# Patient Record
Sex: FEMALE | Race: WHITE | NOT HISPANIC OR LATINO | Employment: FULL TIME | URBAN - METROPOLITAN AREA
[De-identification: names, ages, dates, MRNs, and addresses within clinical notes are randomized per-mention and may not be internally consistent; named-entity substitution may affect disease eponyms.]

---

## 2018-10-16 ENCOUNTER — APPOINTMENT (EMERGENCY)
Dept: CT IMAGING | Facility: HOSPITAL | Age: 23
End: 2018-10-16
Payer: COMMERCIAL

## 2018-10-16 ENCOUNTER — HOSPITAL ENCOUNTER (EMERGENCY)
Facility: HOSPITAL | Age: 23
Discharge: HOME | End: 2018-10-16
Attending: EMERGENCY MEDICINE
Payer: COMMERCIAL

## 2018-10-16 VITALS
OXYGEN SATURATION: 97 % | SYSTOLIC BLOOD PRESSURE: 99 MMHG | RESPIRATION RATE: 16 BRPM | WEIGHT: 141.54 LBS | DIASTOLIC BLOOD PRESSURE: 65 MMHG | HEIGHT: 63 IN | HEART RATE: 62 BPM | TEMPERATURE: 98.2 F | BODY MASS INDEX: 25.08 KG/M2

## 2018-10-16 DIAGNOSIS — R19.7 DIARRHEA: ICD-10-CM

## 2018-10-16 DIAGNOSIS — R10.9 ABDOMINAL PAIN: Primary | ICD-10-CM

## 2018-10-16 LAB
ALBUMIN SERPL BCP-MCNC: 3.4 G/DL (ref 3.5–5)
ALP SERPL-CCNC: 63 U/L (ref 46–116)
ALT SERPL W P-5'-P-CCNC: 26 U/L (ref 12–78)
ANION GAP SERPL CALCULATED.3IONS-SCNC: 9 MMOL/L (ref 4–13)
AST SERPL W P-5'-P-CCNC: 10 U/L (ref 5–45)
BACTERIA UR QL AUTO: ABNORMAL /HPF
BASOPHILS # BLD AUTO: 0.03 THOUSANDS/ΜL (ref 0–0.1)
BASOPHILS NFR BLD AUTO: 0 % (ref 0–1)
BILIRUB SERPL-MCNC: 0.3 MG/DL (ref 0.2–1)
BILIRUB UR QL STRIP: NEGATIVE
BUN SERPL-MCNC: 9 MG/DL (ref 5–25)
CALCIUM SERPL-MCNC: 8.5 MG/DL (ref 8.3–10.1)
CHLORIDE SERPL-SCNC: 105 MMOL/L (ref 100–108)
CLARITY UR: ABNORMAL
CO2 SERPL-SCNC: 27 MMOL/L (ref 21–32)
COLOR UR: YELLOW
CREAT SERPL-MCNC: 0.72 MG/DL (ref 0.6–1.3)
EOSINOPHIL # BLD AUTO: 0.18 THOUSAND/ΜL (ref 0–0.61)
EOSINOPHIL NFR BLD AUTO: 2 % (ref 0–6)
ERYTHROCYTE [DISTWIDTH] IN BLOOD BY AUTOMATED COUNT: 11.6 % (ref 11.6–15.1)
EXT PREG TEST URINE: NEGATIVE
GFR SERPL CREATININE-BSD FRML MDRD: 118 ML/MIN/1.73SQ M
GLUCOSE SERPL-MCNC: 87 MG/DL (ref 65–140)
GLUCOSE UR STRIP-MCNC: NEGATIVE MG/DL
HCT VFR BLD AUTO: 42.1 % (ref 34.8–46.1)
HGB BLD-MCNC: 14.3 G/DL (ref 11.5–15.4)
HGB UR QL STRIP.AUTO: NEGATIVE
IMM GRANULOCYTES # BLD AUTO: 0.03 THOUSAND/UL (ref 0–0.2)
IMM GRANULOCYTES NFR BLD AUTO: 0 % (ref 0–2)
KETONES UR STRIP-MCNC: ABNORMAL MG/DL
LEUKOCYTE ESTERASE UR QL STRIP: NEGATIVE
LIPASE SERPL-CCNC: 107 U/L (ref 73–393)
LYMPHOCYTES # BLD AUTO: 1 THOUSANDS/ΜL (ref 0.6–4.47)
LYMPHOCYTES NFR BLD AUTO: 11 % (ref 14–44)
MCH RBC QN AUTO: 31.6 PG (ref 26.8–34.3)
MCHC RBC AUTO-ENTMCNC: 34 G/DL (ref 31.4–37.4)
MCV RBC AUTO: 93 FL (ref 82–98)
MONOCYTES # BLD AUTO: 0.64 THOUSAND/ΜL (ref 0.17–1.22)
MONOCYTES NFR BLD AUTO: 7 % (ref 4–12)
MUCOUS THREADS UR QL AUTO: ABNORMAL
NEUTROPHILS # BLD AUTO: 7.68 THOUSANDS/ΜL (ref 1.85–7.62)
NEUTS SEG NFR BLD AUTO: 80 % (ref 43–75)
NITRITE UR QL STRIP: NEGATIVE
NON-SQ EPI CELLS URNS QL MICRO: ABNORMAL /HPF
NRBC BLD AUTO-RTO: 0 /100 WBCS
PH UR STRIP.AUTO: 5.5 [PH] (ref 4.5–8)
PLATELET # BLD AUTO: 204 THOUSANDS/UL (ref 149–390)
PMV BLD AUTO: 10.8 FL (ref 8.9–12.7)
POTASSIUM SERPL-SCNC: 3.5 MMOL/L (ref 3.5–5.3)
PROT SERPL-MCNC: 7 G/DL (ref 6.4–8.2)
PROT UR STRIP-MCNC: ABNORMAL MG/DL
RBC # BLD AUTO: 4.53 MILLION/UL (ref 3.81–5.12)
RBC #/AREA URNS AUTO: ABNORMAL /HPF
SODIUM SERPL-SCNC: 141 MMOL/L (ref 136–145)
SP GR UR STRIP.AUTO: >=1.03 (ref 1–1.03)
TSH SERPL DL<=0.05 MIU/L-ACNC: 2.64 UIU/ML (ref 0.36–3.74)
UROBILINOGEN UR QL STRIP.AUTO: 0.2 E.U./DL
WBC # BLD AUTO: 9.56 THOUSAND/UL (ref 4.31–10.16)
WBC #/AREA URNS AUTO: ABNORMAL /HPF

## 2018-10-16 PROCEDURE — 36415 COLL VENOUS BLD VENIPUNCTURE: CPT | Performed by: EMERGENCY MEDICINE

## 2018-10-16 PROCEDURE — 84443 ASSAY THYROID STIM HORMONE: CPT | Performed by: EMERGENCY MEDICINE

## 2018-10-16 PROCEDURE — 99284 EMERGENCY DEPT VISIT MOD MDM: CPT

## 2018-10-16 PROCEDURE — 96361 HYDRATE IV INFUSION ADD-ON: CPT

## 2018-10-16 PROCEDURE — 85025 COMPLETE CBC W/AUTO DIFF WBC: CPT | Performed by: EMERGENCY MEDICINE

## 2018-10-16 PROCEDURE — 96375 TX/PRO/DX INJ NEW DRUG ADDON: CPT

## 2018-10-16 PROCEDURE — 74177 CT ABD & PELVIS W/CONTRAST: CPT

## 2018-10-16 PROCEDURE — 83690 ASSAY OF LIPASE: CPT | Performed by: EMERGENCY MEDICINE

## 2018-10-16 PROCEDURE — 81001 URINALYSIS AUTO W/SCOPE: CPT | Performed by: EMERGENCY MEDICINE

## 2018-10-16 PROCEDURE — 81025 URINE PREGNANCY TEST: CPT | Performed by: EMERGENCY MEDICINE

## 2018-10-16 PROCEDURE — 96374 THER/PROPH/DIAG INJ IV PUSH: CPT

## 2018-10-16 PROCEDURE — 80053 COMPREHEN METABOLIC PANEL: CPT | Performed by: EMERGENCY MEDICINE

## 2018-10-16 RX ORDER — FAMOTIDINE 20 MG/1
20 TABLET, FILM COATED ORAL 2 TIMES DAILY
Qty: 30 TABLET | Refills: 0 | Status: SHIPPED | OUTPATIENT
Start: 2018-10-16 | End: 2018-10-24

## 2018-10-16 RX ORDER — ONDANSETRON 2 MG/ML
4 INJECTION INTRAMUSCULAR; INTRAVENOUS ONCE
Status: COMPLETED | OUTPATIENT
Start: 2018-10-16 | End: 2018-10-16

## 2018-10-16 RX ORDER — DICYCLOMINE HCL 20 MG
20 TABLET ORAL ONCE
Status: COMPLETED | OUTPATIENT
Start: 2018-10-16 | End: 2018-10-16

## 2018-10-16 RX ORDER — KETOROLAC TROMETHAMINE 30 MG/ML
15 INJECTION, SOLUTION INTRAMUSCULAR; INTRAVENOUS ONCE
Status: COMPLETED | OUTPATIENT
Start: 2018-10-16 | End: 2018-10-16

## 2018-10-16 RX ORDER — NAPROXEN 375 MG/1
375 TABLET ORAL 2 TIMES DAILY WITH MEALS
Qty: 20 TABLET | Refills: 0 | Status: SHIPPED | OUTPATIENT
Start: 2018-10-16 | End: 2018-10-24

## 2018-10-16 RX ADMIN — IOHEXOL 100 ML: 350 INJECTION, SOLUTION INTRAVENOUS at 10:33

## 2018-10-16 RX ADMIN — ONDANSETRON 4 MG: 2 INJECTION INTRAMUSCULAR; INTRAVENOUS at 08:15

## 2018-10-16 RX ADMIN — KETOROLAC TROMETHAMINE 15 MG: 30 INJECTION, SOLUTION INTRAMUSCULAR at 12:21

## 2018-10-16 RX ADMIN — SODIUM CHLORIDE 1000 ML: 0.9 INJECTION, SOLUTION INTRAVENOUS at 08:14

## 2018-10-16 RX ADMIN — DICYCLOMINE HYDROCHLORIDE 20 MG: 20 TABLET ORAL at 08:16

## 2018-10-16 RX ADMIN — IOHEXOL 50 ML: 240 INJECTION, SOLUTION INTRATHECAL; INTRAVASCULAR; INTRAVENOUS; ORAL at 10:36

## 2018-10-16 NOTE — ED NOTES
Discharge instructions reviewed, patient has no new complaints or concerns at time of discharge  Patient ambulated out of department, steady gait, vss         Norma Nichols RN  10/16/18 7379

## 2018-10-16 NOTE — DISCHARGE INSTRUCTIONS

## 2018-10-16 NOTE — ED PROVIDER NOTES
Pt Name: Lisa Bañuelos  MRN: [id number]  [place] 1995  Age/Sex: 24 y o  female  Date of evaluation: 10/16/2018  PCP: No primary care provider on file  CHIEF COMPLAINT    Chief Complaint   Patient presents with    Abdominal Pain     Patient c/o abdominal and back pain over the last 3 weeks  Patient states nothing makes it worse or better   Diarrhea     Patient also c/o of diarhhea for the last 3 weeks  HPI    24 y o  female presenting with 3 weeks of abdominal pain and diarrhea  Patient notes 5-6 loose stools per day over the past 3 weeks, nonbloody, intensified by eating large amounts and better with eating less  Abdominal pain varies between mild and severe, sharp and cramping, in the right and left lower quadrants, worse in the right lower quadrant, radiating to the back, typically mild but sometimes very severe(described as worse than labor), relieved by bowel movement  She denies trauma, fevers, recent antibiotic use, recent travel, prior episodes, vomiting, blood in stool, other symptoms  Patient denies any changes in medications recently, denies history of ulcerative colitis or Crohn's disease  HPI      Past Medical and Surgical History    History reviewed  No pertinent past medical history  History reviewed  No pertinent surgical history  History reviewed  No pertinent family history  Social History   Substance Use Topics    Smoking status: Current Every Day Smoker    Smokeless tobacco: Never Used    Alcohol use No           Allergies    Allergies   Allergen Reactions    Penicillins Anaphylaxis    Codeine GI Intolerance    Doxycycline GI Intolerance    Zithromax [Azithromycin] GI Intolerance       Home Medications    Prior to Admission medications    Not on File           Review of Systems    Review of Systems   Constitutional: Negative for activity change, chills and fever  HENT: Negative for drooling and facial swelling      Eyes: Negative for pain, discharge and visual disturbance  Respiratory: Negative for apnea, cough, chest tightness, shortness of breath and wheezing  Cardiovascular: Negative for chest pain and leg swelling  Gastrointestinal: Positive for abdominal distention, abdominal pain and diarrhea  Negative for constipation, nausea and vomiting  Genitourinary: Negative for difficulty urinating, dysuria and urgency  Musculoskeletal: Positive for back pain  Negative for arthralgias and gait problem  Skin: Negative for color change and rash  Neurological: Negative for dizziness, speech difficulty, weakness and headaches  Psychiatric/Behavioral: Negative for agitation, behavioral problems and confusion  All other systems reviewed and negative  Physical Exam      ED Triage Vitals [10/16/18 0754]   Temperature Pulse Respirations Blood Pressure SpO2   98 2 °F (36 8 °C) 85 16 119/78 97 %      Temp Source Heart Rate Source Patient Position - Orthostatic VS BP Location FiO2 (%)   Oral Monitor Sitting Right arm --      Pain Score       --               Physical Exam   Constitutional: She is oriented to person, place, and time  She appears well-developed and well-nourished  HENT:   Head: Normocephalic and atraumatic  Eyes: Pupils are equal, round, and reactive to light  Conjunctivae and EOM are normal    Neck: Normal range of motion  Neck supple  Cardiovascular: Normal rate, regular rhythm, normal heart sounds and intact distal pulses  Pulmonary/Chest: Effort normal and breath sounds normal  No respiratory distress  She has no wheezes  She has no rales  Abdominal: Soft  She exhibits no distension and no mass  There is tenderness  There is no rebound and no guarding  Tender to palpation in right lower and left lower quadrant, worse in right lower quadrant, no rebound, no guarding, no masses  Mild CVA tenderness on both sides  Musculoskeletal: Normal range of motion  She exhibits no edema or deformity     Neurological: She is alert and oriented to person, place, and time  Skin: Skin is warm and dry  No rash noted  No erythema  Psychiatric: She has a normal mood and affect   Her behavior is normal  Judgment and thought content normal             Diagnostic Results      Labs:    Results for orders placed or performed during the hospital encounter of 10/16/18   UA w Reflex to Microscopic w Reflex to Culture   Result Value Ref Range    Color, UA Yellow     Clarity, UA Slightly Cloudy     Specific Gravity, UA >=1 030 1 003 - 1 030    pH, UA 5 5 4 5 - 8 0    Leukocytes, UA Negative Negative    Nitrite, UA Negative Negative    Protein, UA Trace (A) Negative mg/dl    Glucose, UA Negative Negative mg/dl    Ketones, UA Trace (A) Negative mg/dl    Urobilinogen, UA 0 2 0 2, 1 0 E U /dl E U /dl    Bilirubin, UA Negative Negative    Blood, UA Negative Negative   CBC and differential   Result Value Ref Range    WBC 9 56 4 31 - 10 16 Thousand/uL    RBC 4 53 3 81 - 5 12 Million/uL    Hemoglobin 14 3 11 5 - 15 4 g/dL    Hematocrit 42 1 34 8 - 46 1 %    MCV 93 82 - 98 fL    MCH 31 6 26 8 - 34 3 pg    MCHC 34 0 31 4 - 37 4 g/dL    RDW 11 6 11 6 - 15 1 %    MPV 10 8 8 9 - 12 7 fL    Platelets 756 983 - 642 Thousands/uL    nRBC 0 /100 WBCs    Neutrophils Relative 80 (H) 43 - 75 %    Immat GRANS % 0 0 - 2 %    Lymphocytes Relative 11 (L) 14 - 44 %    Monocytes Relative 7 4 - 12 %    Eosinophils Relative 2 0 - 6 %    Basophils Relative 0 0 - 1 %    Neutrophils Absolute 7 68 (H) 1 85 - 7 62 Thousands/µL    Immature Grans Absolute 0 03 0 00 - 0 20 Thousand/uL    Lymphocytes Absolute 1 00 0 60 - 4 47 Thousands/µL    Monocytes Absolute 0 64 0 17 - 1 22 Thousand/µL    Eosinophils Absolute 0 18 0 00 - 0 61 Thousand/µL    Basophils Absolute 0 03 0 00 - 0 10 Thousands/µL   Comprehensive metabolic panel   Result Value Ref Range    Sodium 141 136 - 145 mmol/L    Potassium 3 5 3 5 - 5 3 mmol/L    Chloride 105 100 - 108 mmol/L    CO2 27 21 - 32 mmol/L    ANION GAP 9 4 - 13 mmol/L    BUN 9 5 - 25 mg/dL    Creatinine 0 72 0 60 - 1 30 mg/dL    Glucose 87 65 - 140 mg/dL    Calcium 8 5 8 3 - 10 1 mg/dL    AST 10 5 - 45 U/L    ALT 26 12 - 78 U/L    Alkaline Phosphatase 63 46 - 116 U/L    Total Protein 7 0 6 4 - 8 2 g/dL    Albumin 3 4 (L) 3 5 - 5 0 g/dL    Total Bilirubin 0 30 0 20 - 1 00 mg/dL    eGFR 118 ml/min/1 73sq m   Lipase   Result Value Ref Range    Lipase 107 73 - 393 u/L   Urine Microscopic   Result Value Ref Range    RBC, UA 1-2 (A) None Seen, 0-5 /hpf    WBC, UA 1-2 (A) None Seen, 0-5, 5-55, 5-65 /hpf    Epithelial Cells Occasional None Seen, Occasional /hpf    Bacteria, UA Occasional None Seen, Occasional /hpf    MUCOUS THREADS Innumerable (A) None Seen   TSH   Result Value Ref Range    TSH 3RD GENERATON 2 642 0 358 - 3 740 uIU/mL   POCT pregnancy, urine   Result Value Ref Range    EXT PREG TEST UR (Ref: Negative) negative        All labs reviewed and utilized in the medical decision making process    Radiology:    CT abdomen pelvis with contrast   Final Result      No acute inflammatory process detected in the abdomen or pelvis  No evidence of bowel obstruction              Workstation performed: [id number]             All radiology studies independently viewed by me and interpreted by the radiologist     Procedure    Procedures    CritCare Time      ED Course of Care and Re-Assessments      Symptoms improved substantially with treatment as below    Medications   ketorolac (TORADOL) injection 15 mg (not administered)   ondansetron (ZOFRAN) injection 4 mg (4 mg Intravenous Given 10/16/18 0815)   sodium chloride 0 9 % bolus 1,000 mL (0 mL Intravenous Stopped 10/16/18 0914)   dicyclomine (BENTYL) tablet 20 mg (20 mg Oral Given 10/16/18 0816)   iohexol (OMNIPAQUE) 240 MG/ML solution 50 mL (50 mL Oral Given 10/16/18 1036)   iohexol (OMNIPAQUE) 350 MG/ML injection (MULTI-DOSE) 100 mL (100 mL Intravenous Given 10/16/18 1033)           FINAL IMPRESSION    Final diagnoses:   Abdominal pain   Diarrhea         DISPOSITION/PLAN    34-year-old female with history and symptoms above  Vital signs reassuring, examination remarkable for tenderness as above with overall benign abdominal exam   Labs and CT scan both returned reassuring  Do not suspect sepsis, appendicitis, cholecystitis, diverticulitis, severe Crohn's or ulcerative colitis, bowel obstruction other acute life threat  Patient referred to Gastroenterology for further care with recommendation for colonoscopy, given Naprosyn and famotidine  Hemodynamically stable and comfortable at time of discharge  Time reflects when diagnosis was documented in both MDM as applicable and the Disposition within this note     Time User Action Codes Description Comment    10/16/2018 12:14 PM Anna Heal T Add [R10 9] Abdominal pain     10/16/2018 12:14 PM Phoenix Heal T Add [R19 7] Diarrhea       ED Disposition     ED Disposition Condition Comment    Discharge  [address] discharge to home/self care      Condition at discharge: Good        Follow-up Information     Follow up With Specialties Details Why Contact Info Additional [facility] Call today To discuss your symptoms and schedule follow-up as soon as possible [address] [facility], [address] [place], [place], [phone]            PATIENT REFERRED TO:    [facility]  David Cosby [address]  Call today  To discuss your symptoms and schedule follow-up as soon as possible      DISCHARGE MEDICATIONS:    Patient's Medications   Discharge Prescriptions    FAMOTIDINE (PEPCID) 20 MG TABLET    Take 1 tablet (20 mg total) by mouth 2 (two) times a day       Start Date: 10/16/2018End Date: --       Order Dose: 20 mg       Quantity: 30 tablet    Refills: 0    NAPROXEN (NAPROSYN) 375 MG TABLET    Take 1 tablet (375 mg total) by mouth 2 (two) times a day with meals       Start Date: 10/16/2018End Date: --       Order Dose: 375 mg       Quantity: 20 tablet    Refills: 0       No discharge procedures on file           MD Natasha Márquez MD  10/16/18 1417

## 2018-10-24 ENCOUNTER — OFFICE VISIT (OUTPATIENT)
Dept: GASTROENTEROLOGY | Facility: CLINIC | Age: 23
End: 2018-10-24
Payer: COMMERCIAL

## 2018-10-24 VITALS
DIASTOLIC BLOOD PRESSURE: 72 MMHG | HEART RATE: 76 BPM | HEIGHT: 63 IN | BODY MASS INDEX: 24.27 KG/M2 | SYSTOLIC BLOOD PRESSURE: 100 MMHG | WEIGHT: 137 LBS

## 2018-10-24 DIAGNOSIS — R19.7 DIARRHEA, UNSPECIFIED TYPE: Primary | ICD-10-CM

## 2018-10-24 DIAGNOSIS — K92.1 BLACK STOOL: ICD-10-CM

## 2018-10-24 DIAGNOSIS — R10.13 EPIGASTRIC PAIN: ICD-10-CM

## 2018-10-24 DIAGNOSIS — R10.30 LOWER ABDOMINAL PAIN: ICD-10-CM

## 2018-10-24 PROCEDURE — 99204 OFFICE O/P NEW MOD 45 MIN: CPT | Performed by: NURSE PRACTITIONER

## 2018-10-24 RX ORDER — DICYCLOMINE HCL 20 MG
20 TABLET ORAL EVERY 6 HOURS
Qty: 120 TABLET | Refills: 0 | Status: SHIPPED | OUTPATIENT
Start: 2018-10-24 | End: 2018-11-20 | Stop reason: SDUPTHER

## 2018-10-24 RX ORDER — LEVONORGESTREL / ETHINYL ESTRADIOL AND ETHINYL ESTRADIOL 150-30(84)
1 KIT ORAL
COMMUNITY
Start: 2018-07-02 | End: 2019-07-02

## 2018-10-24 RX ORDER — PANTOPRAZOLE SODIUM 40 MG/1
40 TABLET, DELAYED RELEASE ORAL DAILY
Qty: 30 TABLET | Refills: 3 | Status: SHIPPED | OUTPATIENT
Start: 2018-10-24 | End: 2022-01-11 | Stop reason: SDUPTHER

## 2018-10-24 RX ORDER — CLONAZEPAM 1 MG/1
1 TABLET ORAL DAILY
COMMUNITY
Start: 2018-10-23

## 2018-10-24 RX ORDER — ALBUTEROL SULFATE 90 UG/1
1 AEROSOL, METERED RESPIRATORY (INHALATION) EVERY 6 HOURS
COMMUNITY
Start: 2018-10-23 | End: 2019-10-23

## 2018-10-24 NOTE — PROGRESS NOTES
Assessment/Plan:    Diarrhea up to 6 times a day with crampy abdominal pain and bloody and black stool:  Infectious stool workup   EGD and colonoscopy    Epigastric pain, current every day smoker   pantoprazole as prescribed     Diagnoses and all orders for this visit:    Diarrhea, unspecified type  -     Ova and parasite examination; Future  -     White Blood Cells, Stool by Gram Stain; Future  -     Clostridium difficile toxin by PCR; Future  -     Stool Enteric Bacterial Panel by PCR; Future  -     dicyclomine (BENTYL) 20 mg tablet; Take 1 tablet (20 mg total) by mouth every 6 (six) hours for 30 days    Lower abdominal pain  -     dicyclomine (BENTYL) 20 mg tablet; Take 1 tablet (20 mg total) by mouth every 6 (six) hours for 30 days    Black stool    Epigastric pain  -     pantoprazole (PROTONIX) 40 mg tablet; Take 1 tablet (40 mg total) by mouth daily    Other orders  -     albuterol (PROVENTIL HFA,VENTOLIN HFA) 90 mcg/act inhaler; Inhale 1 puff every 6 (six) hours  -     clonazePAM (KlonoPIN) 1 mg tablet; Take 1 mg by mouth daily  -     Levonorgest-Eth Estrad 91-Day 0 15-0 03 &0 01 MG TABS; Take 1 tablet by mouth    @ASSESSMENTEN  D@     Subjective:      Patient ID: Eva Sanchez is a 24 y o  female  43-year-old female with a four week history of loose stools and sometimes liquid stools up to 6 times a day  She has black stools and also has stools with blood in them  She took a picture and showed it to me today  She reports epigastric pain but no nausea or vomiting and heartburn but no dysphagia or sore throat  She reports no recent travel or hospitalization  She takes NSAIDS on occasion she does take Imitrex for migraines and she is a current every day smoker about 1-2 cigarettes  She does not take any herbal supplements  She has not had any stool workup done    Recently seen in the emergency room within normal CT of the abdomen and pelvis and normal CBC today  She has fatigue but no fever or chills and she has no joint pain            The following portions of the patient's history were reviewed and updated as appropriate: She  has no past medical history on file  She   Patient Active Problem List    Diagnosis Date Noted    Lower abdominal pain 10/24/2018    Diarrhea 10/24/2018    Epigastric pain 10/24/2018    Black stools 10/24/2018     She  has a past surgical history that includes Tonsilectomy, adenoidectomy, bilateral myringotomy and tubes; Soft tissue mass excision; CHROMOSOME ANALYSIS, PRODUCTS OF CONCEPTION (HISTORICAL); and [place] tooth extraction  Her family history includes Diabetes in her mother; Heart disease in her father; Hypertension in her father; Stroke in her father  She  reports that she has been smoking  She has never used smokeless tobacco  She reports that she does not drink alcohol or use drugs  Current Outpatient Prescriptions   Medication Sig Dispense Refill    albuterol (PROVENTIL HFA,VENTOLIN HFA) 90 mcg/act inhaler Inhale 1 puff every 6 (six) hours      clonazePAM (KlonoPIN) 1 mg tablet Take 1 mg by mouth daily      Levonorgest-Eth Estrad 91-Day 0 15-0 03 &0 01 MG TABS Take 1 tablet by mouth      dicyclomine (BENTYL) 20 mg tablet Take 1 tablet (20 mg total) by mouth every 6 (six) hours for 30 days 120 tablet 0    pantoprazole (PROTONIX) 40 mg tablet Take 1 tablet (40 mg total) by mouth daily 30 tablet 3     No current facility-administered medications for this visit  Current Outpatient Prescriptions on File Prior to Visit   Medication Sig    [DISCONTINUED] famotidine (PEPCID) 20 mg tablet Take 1 tablet (20 mg total) by mouth 2 (two) times a day    [DISCONTINUED] naproxen (NAPROSYN) 375 mg tablet Take 1 tablet (375 mg total) by mouth 2 (two) times a day with meals     No current facility-administered medications on file prior to visit  She is allergic to penicillins; codeine; doxycycline; and zithromax [azithromycin]       Review of Systems Constitutional: Positive for fatigue  HENT: Negative  Respiratory: Negative  Cardiovascular: Negative  Gastrointestinal: Positive for abdominal distention, abdominal pain, anal bleeding, blood in stool and diarrhea  Skin: Negative  Psychiatric/Behavioral: Negative  Objective:      /72   Pulse 76   Ht 5' 3" (1 6 m)   Wt 62 1 kg (137 lb)   LMP 10/06/2018 (Approximate)   BMI 24 27 kg/m²          Physical Exam   Constitutional: She is oriented to person, place, and time  She appears well-developed and well-nourished  Eyes: No scleral icterus  Cardiovascular: Normal rate and regular rhythm  Pulmonary/Chest: Effort normal and breath sounds normal    Abdominal: Soft  Bowel sounds are normal  There is tenderness  Lymphadenopathy:     She has no cervical adenopathy  Neurological: She is alert and oriented to person, place, and time  Skin: Skin is warm and dry  Psychiatric: She has a normal mood and affect

## 2018-10-29 ENCOUNTER — TELEPHONE (OUTPATIENT)
Dept: GASTROENTEROLOGY | Facility: CLINIC | Age: 23
End: 2018-10-29

## 2018-10-29 NOTE — TELEPHONE ENCOUNTER
Start clear liquids today, have her take 10 oz of Mag citrate today and tomorrow, and have her do Fleet enemas today and tomorrow before she takes a laxative, and then a regular prep

## 2018-10-29 NOTE — TELEPHONE ENCOUNTER
ptn is scheduled for a colon on 10/31/18 but she hasn't made a BM in about 8 days  She states she tried laxatives, suppositories, and enemas and still cant go so she doesn't think the prep will work   Please advise

## 2018-10-29 NOTE — TELEPHONE ENCOUNTER
Pt already tried enemas  But she will start the clear liquids and the mag citrate   If nothing happens she will call this afternoon

## 2018-10-29 NOTE — TELEPHONE ENCOUNTER
Just what I already suggested - and do the enemas even if she tried them before  If nothing works she may be impacted, and should probably go to the er

## 2018-10-30 ENCOUNTER — ANESTHESIA EVENT (OUTPATIENT)
Dept: PERIOP | Facility: HOSPITAL | Age: 23
End: 2018-10-30
Payer: COMMERCIAL

## 2018-10-30 NOTE — ANESTHESIA PREPROCEDURE EVALUATION
Review of Systems/Medical History  Patient summary reviewed        Cardiovascular  Negative cardio ROS    Pulmonary  Smoker cigarette smoker  , Tobacco cessation counseling given ,        GI/Hepatic    GERD ,        Negative  ROS        Endo/Other  Negative endo/other ROS      GYN  Negative gynecology ROS          Hematology  Negative hematology ROS      Musculoskeletal  Negative musculoskeletal ROS        Neurology  Negative neurology ROS      Psychology   Anxiety,              Physical Exam    Airway    Mallampati score: I  TM Distance: >3 FB  Neck ROM: full     Dental       Cardiovascular  Comment: Negative ROS, Cardiovascular exam normal    Pulmonary  Pulmonary exam normal     Other Findings        Anesthesia Plan  ASA Score- 2     Anesthesia Type- IV sedation with anesthesia with ASA Monitors  Additional Monitors:   Airway Plan:         Plan Factors-    Induction- intravenous  Postoperative Plan-     Informed Consent- Anesthetic plan and risks discussed with patient  I personally reviewed this patient with the CRNA  Discussed and agreed on the Anesthesia Plan with the CRNA  Sakshi Mckeon

## 2018-10-31 ENCOUNTER — ANESTHESIA (OUTPATIENT)
Dept: PERIOP | Facility: HOSPITAL | Age: 23
End: 2018-10-31
Payer: COMMERCIAL

## 2018-10-31 ENCOUNTER — HOSPITAL ENCOUNTER (OUTPATIENT)
Facility: HOSPITAL | Age: 23
Setting detail: OUTPATIENT SURGERY
Discharge: HOME | End: 2018-10-31
Attending: INTERNAL MEDICINE | Admitting: INTERNAL MEDICINE
Payer: COMMERCIAL

## 2018-10-31 VITALS
BODY MASS INDEX: 23.83 KG/M2 | DIASTOLIC BLOOD PRESSURE: 69 MMHG | TEMPERATURE: 97.5 F | OXYGEN SATURATION: 98 % | HEIGHT: 63 IN | WEIGHT: 134.48 LBS | SYSTOLIC BLOOD PRESSURE: 112 MMHG | HEART RATE: 77 BPM | RESPIRATION RATE: 24 BRPM

## 2018-10-31 DIAGNOSIS — R19.7 DIARRHEA, UNSPECIFIED TYPE: ICD-10-CM

## 2018-10-31 DIAGNOSIS — R10.13 EPIGASTRIC PAIN: ICD-10-CM

## 2018-10-31 DIAGNOSIS — K92.1 BLACK STOOLS: ICD-10-CM

## 2018-10-31 PROCEDURE — 45380 COLONOSCOPY AND BIOPSY: CPT | Performed by: INTERNAL MEDICINE

## 2018-10-31 PROCEDURE — 88305 TISSUE EXAM BY PATHOLOGIST: CPT | Performed by: PATHOLOGY

## 2018-10-31 PROCEDURE — 88341 IMHCHEM/IMCYTCHM EA ADD ANTB: CPT | Performed by: PATHOLOGY

## 2018-10-31 PROCEDURE — 88342 IMHCHEM/IMCYTCHM 1ST ANTB: CPT | Performed by: PATHOLOGY

## 2018-10-31 PROCEDURE — 43239 EGD BIOPSY SINGLE/MULTIPLE: CPT | Performed by: INTERNAL MEDICINE

## 2018-10-31 RX ORDER — PROPOFOL 10 MG/ML
INJECTION, EMULSION INTRAVENOUS AS NEEDED
Status: DISCONTINUED | OUTPATIENT
Start: 2018-10-31 | End: 2018-10-31 | Stop reason: SURG

## 2018-10-31 RX ORDER — SODIUM CHLORIDE, SODIUM LACTATE, POTASSIUM CHLORIDE, CALCIUM CHLORIDE 600; 310; 30; 20 MG/100ML; MG/100ML; MG/100ML; MG/100ML
125 INJECTION, SOLUTION INTRAVENOUS CONTINUOUS
Status: DISCONTINUED | OUTPATIENT
Start: 2018-10-31 | End: 2018-10-31 | Stop reason: HOSPADM

## 2018-10-31 RX ADMIN — PROPOFOL 100 MG: 10 INJECTION, EMULSION INTRAVENOUS at 11:45

## 2018-10-31 RX ADMIN — SODIUM CHLORIDE, SODIUM LACTATE, POTASSIUM CHLORIDE, AND CALCIUM CHLORIDE 125 ML/HR: .6; .31; .03; .02 INJECTION, SOLUTION INTRAVENOUS at 11:31

## 2018-10-31 RX ADMIN — PROPOFOL 100 MG: 10 INJECTION, EMULSION INTRAVENOUS at 11:51

## 2018-10-31 RX ADMIN — PROPOFOL 100 MG: 10 INJECTION, EMULSION INTRAVENOUS at 12:01

## 2018-10-31 NOTE — DISCHARGE INSTR - AVS FIRST PAGE
Postoperative Note  PATIENT NAME: Raymond Gotti  : 1995  MRN: [id number]  [facility] 01    Surgery Date: 10/31/2018    POST-OP DIAGNOSIS: See the impression below    SEDATION: Monitored anesthesia care, check anesthesia records    PHYSICAL EXAM:  Vitals:    10/31/18 1117   BP: 109/74   Pulse: 93   Resp: 16   Temp: 97 7 °F (36 5 °C)   SpO2: 96%     Body mass index is 23 82 kg/m²  General: NAD  Heart: S1 & S2 normal, RRR  Lungs: CTA, No rales or rhonchi  Abdomen: Soft, nontender, nondistended, good bowel sounds    CONSENT:  Informed consent was obtained for the procedure, including sedation after explaining the risks and benefits of the procedure  Risks including but not limited to bleeding, perforation, infection, aspiration were discussed in detail  Also explained about less than 100%$ sensitivity with the exam and other alternatives  DESCRIPTION:   Procedure:  Esophagogastroduodenoscopy with biopsy     indications:  48-year-old female with midepigastric pain and possible melena    ASA 2    Estimated blood loss insignificant    Premedicated with mac anesthesia    Patient is identified by me  She is examined prior to the procedure and found to be in stable condition  She is attached to the cardiac monitor and pulse oximeter and placed in left lateral position  After adequate intravenous sedation the Olympus video gastric scope is inserted under direct vision into the esophagus  The esophageal mucosa is completely unremarkable throughout its length with a normal Z-line at 39 cm  The stomach is entered  The body and antrum normal   Pylorus is normal   Duodenum was cannulated into the 3rd portion and is normal   Retroversion reveals a normal GE junction cardia and fundus  Biopsies taken of the antrum body and fundus with excellent hemostasis  She tolerated the procedure well and was stable throughout      My impression:  Normal upper GI endoscopy, status post biopsy    RECOMMENDATIONS:  Follow up biopsy results in 1 week  Continue current medical management    COMPLICATIONS:  None; patient tolerated the procedure well  DISPOSITION: PACU  CONDITION: Stable    Dayna Hurst MD  10/31/2018,11:52 AM        Portions of the record may have been created with voice recognition software   Occasional wrong word or "sound a like" substitutions may have occurred due to the inherent limitations of voice recognition software   Read the chart carefully and recognize, using context, where substitutions have occurred  Postoperative Note  PATIENT NAME: Jessica Jones  : 1995  MRN: [id number]  [facility]    Surgery Date: 10/31/2018    POST-OP DIAGNOSIS: See the impression below    SEDATION: Monitored anesthesia care, check anesthesia records    PHYSICAL EXAM:  Vitals:    10/31/18 1117   BP: 109/74   Pulse: 93   Resp: 16   Temp: 97 7 °F (36 5 °C)   SpO2: 96%     Body mass index is 23 82 kg/m²  General: NAD  Heart: S1 & S2 normal, RRR  Lungs: CTA, No rales or rhonchi  Abdomen: Soft, nontender, nondistended, good bowel sounds    CONSENT:  Informed consent was obtained for the procedure, including sedation after explaining the risks and benefits of the procedure  Risks including but not limited to bleeding, perforation, infection, aspiration were discussed in detail  Also explained about less than 100%$ sensitivity with the exam and other alternatives  DESCRIPTION:   Procedure:  Fiberoptic colonoscopy with biopsy    Indications:  25-year-old female with bilateral lower abdominal pain diarrhea and constipation    ASA 2    Estimated blood loss insignificant    Premedicated with mac anesthesia    Patient is identified by me  She is examined prior to the procedure and found to be in stable condition  She is attached to the cardiac monitor and pulse oximeter and placed left lateral position    After adequate intravenous sedation the Olympus video colonoscope was inserted and passed easily to the cecum  The ileocecal valve was identified as well as the appendiceal orifice  The valve was cannulated and the terminal ileum is examined for approximately 10 cm  The scope was slowly withdrawn good circumferential visualization of the mucosa  Preparation is adequate  The terminal ileum as well as the entire colonic mucosa is completely unremarkable with no inflammatory neoplastic or vascular lesions noted  Retroversion is normal   Biopsies taken of the terminal ileum, ascending, and descending colon with excellent hemostasis  Retroversion is normal   She tolerated the procedure well and was stable throughout  My impression:  Normal colon terminal ileum, status post ileal and colonic biopsies    RECOMMENDATIONS:  Follow up biopsy results in 1 week    COMPLICATIONS:  None; patient tolerated the procedure well  DISPOSITION: PACU  CONDITION: Stable    Yusuf Andrew MD  10/31/2018,12:07 PM        Portions of the record may have been created with voice recognition software   Occasional wrong word or "sound a like" substitutions may have occurred due to the inherent limitations of voice recognition software   Read the chart carefully and recognize, using context, where substitutions have occurred

## 2018-10-31 NOTE — OP NOTE
Postoperative Note  PATIENT NAME: Christofer Zhang  : 1995  MRN: [id number]  [facility]    Surgery Date: 10/31/2018    POST-OP DIAGNOSIS: See the impression below    SEDATION: Monitored anesthesia care, check anesthesia records    PHYSICAL EXAM:  Vitals:    10/31/18 1117   BP: 109/74   Pulse: 93   Resp: 16   Temp: 97 7 °F (36 5 °C)   SpO2: 96%     Body mass index is 23 82 kg/m²  General: NAD  Heart: S1 & S2 normal, RRR  Lungs: CTA, No rales or rhonchi  Abdomen: Soft, nontender, nondistended, good bowel sounds    CONSENT:  Informed consent was obtained for the procedure, including sedation after explaining the risks and benefits of the procedure  Risks including but not limited to bleeding, perforation, infection, aspiration were discussed in detail  Also explained about less than 100%$ sensitivity with the exam and other alternatives  DESCRIPTION:   Procedure:  Fiberoptic colonoscopy with biopsy    Indications:  21-year-old female with bilateral lower abdominal pain diarrhea and constipation    ASA 2    Estimated blood loss insignificant    Premedicated with mac anesthesia    Patient is identified by me  She is examined prior to the procedure and found to be in stable condition  She is attached to the cardiac monitor and pulse oximeter and placed left lateral position  After adequate intravenous sedation the Olympus video colonoscope was inserted and passed easily to the cecum  The ileocecal valve was identified as well as the appendiceal orifice  The valve was cannulated and the terminal ileum is examined for approximately 10 cm  The scope was slowly withdrawn good circumferential visualization of the mucosa  Preparation is adequate  The terminal ileum as well as the entire colonic mucosa is completely unremarkable with no inflammatory neoplastic or vascular lesions noted    Retroversion is normal   Biopsies taken of the terminal ileum, ascending, and descending colon with excellent hemostasis  Retroversion is normal   She tolerated the procedure well and was stable throughout  My impression:  Normal colon terminal ileum, status post ileal and colonic biopsies    RECOMMENDATIONS:  Follow up biopsy results in 1 week    COMPLICATIONS:  None; patient tolerated the procedure well  DISPOSITION: PACU  CONDITION: Stable    Aldo Betancur MD  10/31/2018,12:07 PM        Portions of the record may have been created with voice recognition software   Occasional wrong word or "sound a like" substitutions may have occurred due to the inherent limitations of voice recognition software   Read the chart carefully and recognize, using context, where substitutions have occurred

## 2018-10-31 NOTE — ANESTHESIA POSTPROCEDURE EVALUATION
Post-Op Assessment Note      CV Status:  Stable    Mental Status:  Alert and awake    Hydration Status:  Euvolemic    PONV Controlled:  Controlled    Airway Patency:  Patent    Post Op Vitals Reviewed: Yes          Staff: Anesthesiologist, CRNA           /58 (10/31/18 1210)    Temp 98 °F (36 7 °C) (10/31/18 1210)    Pulse 84 (10/31/18 1210)   Resp 16 (10/31/18 1210)    SpO2 96 % (10/31/18 1210)

## 2018-10-31 NOTE — OP NOTE
Postoperative Note  PATIENT NAME: Luz Lopez  : 1995  MRN: [id number]  [facility]    Surgery Date: 10/31/2018    POST-OP DIAGNOSIS: See the impression below    SEDATION: Monitored anesthesia care, check anesthesia records    PHYSICAL EXAM:  Vitals:    10/31/18 1117   BP: 109/74   Pulse: 93   Resp: 16   Temp: 97 7 °F (36 5 °C)   SpO2: 96%     Body mass index is 23 82 kg/m²  General: NAD  Heart: S1 & S2 normal, RRR  Lungs: CTA, No rales or rhonchi  Abdomen: Soft, nontender, nondistended, good bowel sounds    CONSENT:  Informed consent was obtained for the procedure, including sedation after explaining the risks and benefits of the procedure  Risks including but not limited to bleeding, perforation, infection, aspiration were discussed in detail  Also explained about less than 100%$ sensitivity with the exam and other alternatives  DESCRIPTION:   Procedure:  Esophagogastroduodenoscopy with biopsy     indications:  25-year-old female with midepigastric pain and possible melena    ASA 2    Estimated blood loss insignificant    Premedicated with mac anesthesia    Patient is identified by me  She is examined prior to the procedure and found to be in stable condition  She is attached to the cardiac monitor and pulse oximeter and placed in left lateral position  After adequate intravenous sedation the Olympus video gastric scope is inserted under direct vision into the esophagus  The esophageal mucosa is completely unremarkable throughout its length with a normal Z-line at 39 cm  The stomach is entered  The body and antrum normal   Pylorus is normal   Duodenum was cannulated into the 3rd portion and is normal   Retroversion reveals a normal GE junction cardia and fundus  Biopsies taken of the antrum body and fundus with excellent hemostasis  She tolerated the procedure well and was stable throughout      My impression:  Normal upper GI endoscopy, status post biopsy    RECOMMENDATIONS:  Follow up biopsy results in 1 week  Continue current medical management    COMPLICATIONS:  None; patient tolerated the procedure well  DISPOSITION: PACU  CONDITION: Stable    Julia Tran MD  10/31/2018,11:52 AM        Portions of the record may have been created with voice recognition software   Occasional wrong word or "sound a like" substitutions may have occurred due to the inherent limitations of voice recognition software   Read the chart carefully and recognize, using context, where substitutions have occurred

## 2018-10-31 NOTE — DISCHARGE INSTRUCTIONS
Upper Endoscopy   WHAT YOU NEED TO KNOW:   An upper endoscopy is also called an upper gastrointestinal (GI) endoscopy, or an esophagogastroduodenoscopy (EGD)  You may feel bloated, gassy, or have some abdominal discomfort after your procedure  Your throat may be sore for 24 to 36 hours  You may burp or pass gas from air that is still inside your body  DISCHARGE INSTRUCTIONS:   Call 911 if:   · You have sudden chest pain or trouble breathing  Seek care immediately if:   · You feel dizzy or faint  · You have trouble swallowing  · You have severe throat pain  · Your bowel movements are very dark or black  · Your abdomen is hard and firm and you have severe pain  · You vomit blood  Contact your healthcare provider if:   · You feel full or bloated and cannot burp or pass gas  · You have not had a bowel movement for 3 days after your procedure  · You have neck pain  · You have a fever or chills  · You have nausea or are vomiting  · You have a rash or hives  · You have questions or concerns about your endoscopy  Relieve a sore throat:  Suck on throat lozenges or crushed ice  Gargle with a small amount of warm salt water  Mix 1 teaspoon of salt and 1 cup of warm water to make salt water  Relieve gas and discomfort from bloating:  Lie on your right side with a heating pad on your abdomen  Take short walks to help pass gas  Eat small meals until bloating is relieved  Rest after your procedure:  Do not drive or make important decisions until the day after your procedure  Return to your normal activity as directed  You can usually return to work the day after your procedure  Follow up with your healthcare provider as directed:  Write down your questions so you remember to ask them during your visits  © 2017 [employer] Information is for End User's use only and may not be sold, redistributed or otherwise used for commercial purposes   All illustrations and images included in CareNotes® are the copyrighted property of A D A M , Inc  or [employer]  The above information is an  only  It is not intended as medical advice for individual conditions or treatments  Talk to your doctor, nurse or pharmacist before following any medical regimen to see if it is safe and effective for you  Colonoscopy   WHAT YOU NEED TO KNOW:   A colonoscopy is a procedure to examine the inside of your colon (intestine) with a scope  Polyps or tissue growths may have been removed during your colonoscopy  It is normal to feel bloated and to have some abdominal discomfort  You should be passing gas  If you have hemorrhoids or you had polyps removed, you may have a small amount of bleeding  DISCHARGE INSTRUCTIONS:   Seek care immediately if:   · You have a large amount of bright red blood in your bowel movements  · Your abdomen is hard and firm and you have severe pain  · You have sudden trouble breathing  Contact your healthcare provider if:   · You develop a rash or hives  · You have a fever within 24 hours of your procedure       · You have not had a bowel movement for 3 days after your procedure  · You have questions or concerns about your condition or care  Activity:   · Do not lift, strain, or run  for 3 days after your procedure  · Rest after your procedure  You have been given medicine to relax you  Do not  drive or make important decisions until the day after your procedure  Return to your normal activity as directed  · Relieve gas and discomfort from bloating  by lying on your right side with a heating pad on your abdomen  You may need to take short walks to help the gas move out  Eat small meals until bloating is relieved  If you had polyps removed: For 7 days after your procedure:  · Do not  take aspirin  · Do not  go on long car rides    Follow up with your healthcare provider as directed:  Write down your questions so you remember to ask them during your visits  © 2017 [address] is for End User's use only and may not be sold, redistributed or otherwise used for commercial purposes  All illustrations and images included in CareNotes® are the copyrighted property of A D [employer] M , Inc  or Osval[employer] Galindo  The above information is an  only  It is not intended as medical advice for individual conditions or treatments  Talk to your doctor, nurse or pharmacist before following any medical regimen to see if it is safe and effective for you  Irritable Bowel Syndrome   WHAT YOU NEED TO KNOW:   Irritable bowel syndrome (IBS) is a condition that prevents food from moving through your intestines normally  The food may move through too slowly or too quickly  This causes bloating, increased gas, constipation, or diarrhea  DISCHARGE INSTRUCTIONS:   Seek care immediately if:   · You have severe abdominal pain  · Your bowel movements are dark or have blood in them  Contact your healthcare provider if:   · You have a fever  · You have pain in your rectum  · Your abdominal pain does not go away, even after treatment  · You have questions or concerns about your condition or care  Medicines:   · Diarrhea medicine  helps decrease the amount of diarrhea you have  Some of these medicines coat the intestine and make bowel movements less watery  Other medicines work by slowing down how fast the intestines move food through  · Laxatives  help treat constipation by moving food and liquids out of your stomach faster  · Stool softeners  soften your bowel movements to prevent straining  · Muscle relaxers  decrease abdominal pain and muscle spasms  · Take your medicine as directed  Contact your healthcare provider if you think your medicine is not helping or if you have side effects  Tell him of her if you are allergic to any medicine   Keep a list of the medicines, vitamins, and herbs you take  Include the amounts, and when and why you take them  Bring the list or the pill bottles to follow-up visits  Carry your medicine list with you in case of an emergency  Manage IBS:   · Eat a variety of healthy foods  Healthy foods include fruits, vegetables, whole-grain breads, low-fat dairy products, beans, lean meats, and fish  You may need to avoid certain foods to decrease your symptoms  · Drink liquids as directed  Ask how much liquid to drink each day and which liquids are best for you  For most people, good liquids to drink are water, juice, and milk  · Exercise regularly  Ask about the best exercise plan for you  Exercise can decrease your blood pressure and improve your health  · Manage stress  Stress may slow healing and cause illness  Learn new ways to relax, such as deep breathing  · Keep a record  of everything you eat and drink, and your symptoms, for 3 weeks  Bring this record with you to your follow-up visits  Follow up with your healthcare provider as directed:  Write down your questions so you remember to ask them during your visits  © 2017 [address]  Information is for End User's use only and may not be sold, redistributed or otherwise used for commercial purposes  All illustrations and images included in CareNotes® are the copyrighted property of A D A M , Inc  or [employer]val[employer] Galindo  The above information is an  only  It is not intended as medical advice for individual conditions or treatments  Talk to your doctor, nurse or pharmacist before following any medical regimen to see if it is safe and effective for you

## 2018-11-06 ENCOUNTER — TELEPHONE (OUTPATIENT)
Dept: GASTROENTEROLOGY | Facility: CLINIC | Age: 23
End: 2018-11-06

## 2018-11-06 NOTE — TELEPHONE ENCOUNTER
Results given  She has not had a bowel movement in several days  She will follow up with Tiffanie Sotelo

## 2018-11-09 NOTE — TELEPHONE ENCOUNTER
Spoke with patient  H/O abdominal pain, Diarrhea, constipation, epigastric pain    Patient c/o no BM for 10 days, since her colonoscopy  Patient states she has been using ducolax daily without relief  She does not want to use mag citrate  Advised patient to start miralax BID, encourage a lot of fluids, she can utilize an enema if she feels stool in her rectum  She will start this can call with an update  Patient understand to go to ED if she is not passing gas, has severe abdominal pain,or nausea/vomiting without relief  Any other suggestions?

## 2018-11-09 NOTE — TELEPHONE ENCOUNTER
Ptn hasn't made a BM in about 10 days  She states she tried laxatives, suppositories, and enemas and still cant go   She states nothing is working

## 2018-11-20 DIAGNOSIS — R19.7 DIARRHEA, UNSPECIFIED TYPE: ICD-10-CM

## 2018-11-20 DIAGNOSIS — R10.30 LOWER ABDOMINAL PAIN: ICD-10-CM

## 2018-11-20 RX ORDER — DICYCLOMINE HCL 20 MG
20 TABLET ORAL EVERY 6 HOURS
Qty: 120 TABLET | Refills: 0 | Status: SHIPPED | OUTPATIENT
Start: 2018-11-20 | End: 2022-01-11 | Stop reason: SDUPTHER

## 2019-05-29 ENCOUNTER — OFFICE VISIT (OUTPATIENT)
Dept: URGENT CARE | Facility: CLINIC | Age: 24
End: 2019-05-29
Payer: COMMERCIAL

## 2019-05-29 VITALS
RESPIRATION RATE: 18 BRPM | OXYGEN SATURATION: 95 % | WEIGHT: 135 LBS | HEIGHT: 63 IN | SYSTOLIC BLOOD PRESSURE: 97 MMHG | TEMPERATURE: 98.6 F | DIASTOLIC BLOOD PRESSURE: 69 MMHG | BODY MASS INDEX: 23.92 KG/M2 | HEART RATE: 77 BPM

## 2019-05-29 DIAGNOSIS — J02.9 SORE THROAT: Primary | ICD-10-CM

## 2019-05-29 LAB — S PYO AG THROAT QL: NEGATIVE

## 2019-05-29 PROCEDURE — 99283 EMERGENCY DEPT VISIT LOW MDM: CPT | Performed by: PHYSICIAN ASSISTANT

## 2019-05-29 PROCEDURE — G0382 LEV 3 HOSP TYPE B ED VISIT: HCPCS | Performed by: PHYSICIAN ASSISTANT

## 2019-05-29 PROCEDURE — 87880 STREP A ASSAY W/OPTIC: CPT | Performed by: PHYSICIAN ASSISTANT

## 2019-05-29 PROCEDURE — 99203 OFFICE O/P NEW LOW 30 MIN: CPT | Performed by: PHYSICIAN ASSISTANT

## 2019-05-29 RX ORDER — AZITHROMYCIN 250 MG/1
TABLET, FILM COATED ORAL
Qty: 6 TABLET | Refills: 0 | Status: SHIPPED | OUTPATIENT
Start: 2019-05-29 | End: 2019-06-02

## 2019-06-22 ENCOUNTER — TRANSCRIBE ORDERS (OUTPATIENT)
Dept: ADMINISTRATIVE | Facility: HOSPITAL | Age: 24
End: 2019-06-22

## 2019-06-22 ENCOUNTER — APPOINTMENT (OUTPATIENT)
Dept: LAB | Facility: CLINIC | Age: 24
End: 2019-06-22
Payer: COMMERCIAL

## 2019-06-22 DIAGNOSIS — N92.6 IRREGULAR MENSTRUAL CYCLE: Primary | ICD-10-CM

## 2019-06-22 DIAGNOSIS — N92.6 IRREGULAR MENSTRUAL CYCLE: ICD-10-CM

## 2019-06-22 LAB — B-HCG SERPL-ACNC: 8557 MIU/ML

## 2019-06-22 PROCEDURE — 36415 COLL VENOUS BLD VENIPUNCTURE: CPT

## 2019-06-22 PROCEDURE — 84702 CHORIONIC GONADOTROPIN TEST: CPT

## 2019-06-25 ENCOUNTER — TRANSCRIBE ORDERS (OUTPATIENT)
Dept: ADMINISTRATIVE | Facility: HOSPITAL | Age: 24
End: 2019-06-25

## 2019-06-25 ENCOUNTER — APPOINTMENT (OUTPATIENT)
Dept: LAB | Facility: CLINIC | Age: 24
End: 2019-06-25
Payer: COMMERCIAL

## 2019-06-25 DIAGNOSIS — N92.6 IRREGULAR MENSTRUAL CYCLE: Primary | ICD-10-CM

## 2019-06-25 DIAGNOSIS — N92.6 IRREGULAR MENSTRUAL CYCLE: ICD-10-CM

## 2019-06-25 LAB — B-HCG SERPL-ACNC: ABNORMAL MIU/ML

## 2019-06-25 PROCEDURE — 36415 COLL VENOUS BLD VENIPUNCTURE: CPT

## 2019-06-25 PROCEDURE — 84702 CHORIONIC GONADOTROPIN TEST: CPT

## 2019-08-16 ENCOUNTER — TRANSCRIBE ORDERS (OUTPATIENT)
Dept: ADMINISTRATIVE | Facility: HOSPITAL | Age: 24
End: 2019-08-16

## 2020-10-06 ENCOUNTER — OFFICE VISIT (OUTPATIENT)
Dept: URGENT CARE | Facility: CLINIC | Age: 25
End: 2020-10-06
Payer: COMMERCIAL

## 2020-10-06 VITALS
OXYGEN SATURATION: 98 % | DIASTOLIC BLOOD PRESSURE: 82 MMHG | WEIGHT: 160 LBS | BODY MASS INDEX: 28.35 KG/M2 | TEMPERATURE: 97.9 F | SYSTOLIC BLOOD PRESSURE: 100 MMHG | RESPIRATION RATE: 18 BRPM | HEIGHT: 63 IN | HEART RATE: 92 BPM

## 2020-10-06 DIAGNOSIS — N39.0 URINARY TRACT INFECTION WITHOUT HEMATURIA, SITE UNSPECIFIED: Primary | ICD-10-CM

## 2020-10-06 LAB
SL AMB  POCT GLUCOSE, UA: NEGATIVE
SL AMB LEUKOCYTE ESTERASE,UA: ABNORMAL
SL AMB POCT BILIRUBIN,UA: ABNORMAL
SL AMB POCT BLOOD,UA: ABNORMAL
SL AMB POCT CLARITY,UA: CLEAR
SL AMB POCT COLOR,UA: ABNORMAL
SL AMB POCT KETONES,UA: NEGATIVE
SL AMB POCT NITRITE,UA: NEGATIVE
SL AMB POCT PH,UA: 7.5
SL AMB POCT SPECIFIC GRAVITY,UA: 1.01
SL AMB POCT URINE PROTEIN: 100
SL AMB POCT UROBILINOGEN: 1

## 2020-10-06 PROCEDURE — 87077 CULTURE AEROBIC IDENTIFY: CPT | Performed by: PHYSICIAN ASSISTANT

## 2020-10-06 PROCEDURE — 81002 URINALYSIS NONAUTO W/O SCOPE: CPT | Performed by: PHYSICIAN ASSISTANT

## 2020-10-06 PROCEDURE — 87086 URINE CULTURE/COLONY COUNT: CPT | Performed by: PHYSICIAN ASSISTANT

## 2020-10-06 PROCEDURE — 87147 CULTURE TYPE IMMUNOLOGIC: CPT | Performed by: PHYSICIAN ASSISTANT

## 2020-10-06 PROCEDURE — 87186 SC STD MICRODIL/AGAR DIL: CPT | Performed by: PHYSICIAN ASSISTANT

## 2020-10-06 RX ORDER — IBUPROFEN 800 MG/1
800 TABLET ORAL EVERY 8 HOURS PRN
COMMUNITY
Start: 2020-02-01 | End: 2021-01-31

## 2020-10-06 RX ORDER — CIPROFLOXACIN 500 MG/1
500 TABLET, FILM COATED ORAL EVERY 12 HOURS SCHEDULED
Qty: 14 TABLET | Refills: 0 | Status: SHIPPED | OUTPATIENT
Start: 2020-10-06 | End: 2020-10-13

## 2020-10-09 LAB
BACTERIA UR CULT: ABNORMAL
BACTERIA UR CULT: ABNORMAL

## 2020-11-18 ENCOUNTER — HOSPITAL ENCOUNTER (EMERGENCY)
Facility: HOSPITAL | Age: 25
Discharge: HOME | End: 2020-11-18
Attending: EMERGENCY MEDICINE | Admitting: EMERGENCY MEDICINE
Payer: COMMERCIAL

## 2020-11-18 VITALS
BODY MASS INDEX: 27.73 KG/M2 | SYSTOLIC BLOOD PRESSURE: 135 MMHG | HEART RATE: 92 BPM | RESPIRATION RATE: 20 BRPM | WEIGHT: 156.53 LBS | TEMPERATURE: 98.3 F | DIASTOLIC BLOOD PRESSURE: 80 MMHG | OXYGEN SATURATION: 100 %

## 2020-11-18 DIAGNOSIS — Z20.822 SUSPECTED COVID-19 VIRUS INFECTION: Primary | ICD-10-CM

## 2020-11-18 LAB — S PYO DNA THROAT QL NAA+PROBE: NORMAL

## 2020-11-18 PROCEDURE — 99284 EMERGENCY DEPT VISIT MOD MDM: CPT | Performed by: EMERGENCY MEDICINE

## 2020-11-18 PROCEDURE — 87651 STREP A DNA AMP PROBE: CPT | Performed by: EMERGENCY MEDICINE

## 2020-11-18 PROCEDURE — 99283 EMERGENCY DEPT VISIT LOW MDM: CPT

## 2020-11-18 PROCEDURE — 87637 SARSCOV2&INF A&B&RSV AMP PRB: CPT | Performed by: EMERGENCY MEDICINE

## 2020-11-21 LAB
FLUAV RNA NPH QL NAA+PROBE: NOT DETECTED
FLUBV RNA NPH QL NAA+PROBE: NOT DETECTED
RSV RNA NPH QL NAA+PROBE: NOT DETECTED
SARS-COV-2 RNA NPH QL NAA+PROBE: NOT DETECTED

## 2020-11-29 ENCOUNTER — HOSPITAL ENCOUNTER (EMERGENCY)
Facility: HOSPITAL | Age: 25
Discharge: HOME | End: 2020-11-29
Attending: EMERGENCY MEDICINE | Admitting: EMERGENCY MEDICINE
Payer: COMMERCIAL

## 2020-11-29 VITALS
HEART RATE: 95 BPM | DIASTOLIC BLOOD PRESSURE: 72 MMHG | RESPIRATION RATE: 19 BRPM | BODY MASS INDEX: 25.95 KG/M2 | OXYGEN SATURATION: 98 % | WEIGHT: 152 LBS | SYSTOLIC BLOOD PRESSURE: 124 MMHG | HEIGHT: 64 IN | TEMPERATURE: 97.5 F

## 2020-11-29 DIAGNOSIS — S05.00XA CORNEAL ABRASION: Primary | ICD-10-CM

## 2020-11-29 PROCEDURE — 99284 EMERGENCY DEPT VISIT MOD MDM: CPT | Performed by: NURSE PRACTITIONER

## 2020-11-29 PROCEDURE — 99283 EMERGENCY DEPT VISIT LOW MDM: CPT

## 2020-11-29 RX ORDER — ERYTHROMYCIN 5 MG/G
0.5 OINTMENT OPHTHALMIC ONCE
Status: COMPLETED | OUTPATIENT
Start: 2020-11-29 | End: 2020-11-29

## 2020-11-29 RX ORDER — TETRACAINE HYDROCHLORIDE 5 MG/ML
1 SOLUTION OPHTHALMIC ONCE
Status: COMPLETED | OUTPATIENT
Start: 2020-11-29 | End: 2020-11-29

## 2020-11-29 RX ADMIN — FLUORESCEIN SODIUM 1 STRIP: 1 STRIP OPHTHALMIC at 12:50

## 2020-11-29 RX ADMIN — ERYTHROMYCIN 0.5 INCH: 5 OINTMENT OPHTHALMIC at 12:50

## 2020-11-29 RX ADMIN — TETRACAINE HYDROCHLORIDE 1 DROP: 5 SOLUTION OPHTHALMIC at 12:50

## 2021-04-15 ENCOUNTER — NURSE TRIAGE (OUTPATIENT)
Dept: OTHER | Facility: OTHER | Age: 26
End: 2021-04-15

## 2021-04-15 DIAGNOSIS — Z11.59 SPECIAL SCREENING EXAMINATION FOR UNSPECIFIED VIRAL DISEASE: ICD-10-CM

## 2021-04-15 DIAGNOSIS — Z11.59 SPECIAL SCREENING EXAMINATION FOR UNSPECIFIED VIRAL DISEASE: Primary | ICD-10-CM

## 2021-04-15 PROCEDURE — U0005 INFEC AGEN DETEC AMPLI PROBE: HCPCS | Performed by: FAMILY MEDICINE

## 2021-04-15 PROCEDURE — U0003 INFECTIOUS AGENT DETECTION BY NUCLEIC ACID (DNA OR RNA); SEVERE ACUTE RESPIRATORY SYNDROME CORONAVIRUS 2 (SARS-COV-2) (CORONAVIRUS DISEASE [COVID-19]), AMPLIFIED PROBE TECHNIQUE, MAKING USE OF HIGH THROUGHPUT TECHNOLOGIES AS DESCRIBED BY CMS-2020-01-R: HCPCS | Performed by: FAMILY MEDICINE

## 2021-04-15 NOTE — TELEPHONE ENCOUNTER
Regarding: Covid Exposure A-Symptomatic   ----- Message from Marcel Bains sent at 4/15/2021 12:57 PM EDT -----  "I was exposed at work to someone who I work closely and had been in contact with the last few days & need to get tested"

## 2021-04-15 NOTE — TELEPHONE ENCOUNTER
Reason for Disposition   [1] CLOSE CONTACT COVID-19 EXPOSURE within last 14 days AND [2] NO symptoms    Answer Assessment - Initial Assessment Questions  1  COVID-19 CLOSE CONTACT: "Who is the person with the confirmed or suspected COVID-19 infection that you were exposed to?"      Co-worker  2  PLACE of CONTACT: "Where were you when you were exposed to COVID-19?" (e g , home, school, medical waiting room; which city?)      work  3  TYPE of CONTACT: "How much contact was there?" (e g , sitting next to, live in same house, work in same office, same building)      Direct contact  4  DURATION of CONTACT: "How long were you in contact with the COVID-19 patient?" (e g , a few seconds, passed by person, a few minutes, 15 minutes or longer, live with the patient)     All day  5  MASK: "Were you wearing a mask?" "Was the other person wearing a mask?" Note: wearing a mask reduces the risk of an otherwise close contact  no  6  DATE of CONTACT: "When did you have contact with a COVID-19 patient?" (e g , how many days ago)      4/9/21  7  COMMUNITY SPREAD: "Are there lots of cases of COVID-19 (community spread) where you live?" (See public health department website, if unsure)        *No Answer*  8  SYMPTOMS: "Do you have any symptoms?" (e g , fever, cough, breathing difficulty, loss of taste or smell)      *No Answer*  9  PREGNANCY OR POSTPARTUM: "Is there any chance you are pregnant?" "When was your last menstrual period?" "Did you deliver in the last 2 weeks?"      *No Answer*  10  HIGH RISK: "Do you have any heart or lung problems?" "Do you have a weak immune system?" (e g , heart failure, COPD, asthma, HIV positive, chemotherapy, renal failure, diabetes mellitus, sickle cell anemia, obesity)        *No Answer*  11  TRAVEL: "Have you traveled out of the country recently?" If so, "When and where?" Also ask about out-of-state travel, since the CDC has identified some high-risk cities for community spread in the [address]  Note: Travel becomes less relevant if there is widespread community transmission where the patient lives          *No Answer*    Protocols used: CORONAVIRUS (COVID-19) EXPOSURE-ADULT-AH

## 2021-04-16 LAB — SARS-COV-2 RNA RESP QL NAA+PROBE: NEGATIVE

## 2021-04-21 ENCOUNTER — TELEPHONE (OUTPATIENT)
Dept: GASTROENTEROLOGY | Facility: CLINIC | Age: 26
End: 2021-04-21

## 2021-04-21 ENCOUNTER — OFFICE VISIT (OUTPATIENT)
Dept: GASTROENTEROLOGY | Facility: CLINIC | Age: 26
End: 2021-04-21
Payer: COMMERCIAL

## 2021-04-21 VITALS
BODY MASS INDEX: 26.09 KG/M2 | DIASTOLIC BLOOD PRESSURE: 74 MMHG | HEART RATE: 99 BPM | HEIGHT: 64 IN | SYSTOLIC BLOOD PRESSURE: 118 MMHG | WEIGHT: 152.8 LBS

## 2021-04-21 DIAGNOSIS — K62.89 ANAL PAIN: Primary | ICD-10-CM

## 2021-04-21 PROCEDURE — 99213 OFFICE O/P EST LOW 20 MIN: CPT | Performed by: PHYSICIAN ASSISTANT

## 2021-04-21 NOTE — PROGRESS NOTES
[facility] - Outpatient Consultation  Juju Washington 31 y o  female MRN: [id number]  Encounter: [id number]          ASSESSMENT AND PLAN:      1  Anal pain  She has a hemorrhoid and fissure  Will use nifedipine paste with lidocaine - sent to [facility]  Recommeneded sitz bathes and fiber supplement    After acute treatment will refer to colorectal for more definitive treatment  ______________________________________________________________________    HPI:    50-year-old female presents for evaluation of severe external and internal anal pain  She reports is ongoing since Monday  She had anal intercourse with her boyfriend which prompted this symptom  She reports that she experienced an acute onset of pain with a large amount of rectal bleeding  She reports that the pain has been severe and unrelenting since that time  She has tried utilizing over-the-counter creams and tucks pads without relief  She has suffered from recurring hemorrhoids since her pregnancy 4 years ago  She reports that they will be problematic for several weeks and then not noticeable for several weeks  She reports that her bowel movements are regular  She denies any episodes of diarrhea or constipation  She denies having multiple bowel movements a day does not sit on the toilet for long periods of time  She denies any abdominal pain  There has been no recent unexpected weight loss  REVIEW OF SYSTEMS:    CONSTITUTIONAL: Denies any fever, chills, rigors, and weight loss  HEENT: No earache or tinnitus  Denies hearing loss or visual disturbances  CARDIOVASCULAR: No chest pain or palpitations  RESPIRATORY: Denies any cough, hemoptysis, shortness of breath or dyspnea on exertion  GASTROINTESTINAL: As noted in the History of Present Illness  GENITOURINARY: No problems with urination  Denies any hematuria or dysuria  NEUROLOGIC: No dizziness or vertigo, denies headaches     MUSCULOSKELETAL: Denies any muscle or joint pain  SKIN: Denies skin rashes or itching  ENDOCRINE: Denies excessive thirst  Denies intolerance to heat or cold  PSYCHOSOCIAL: Denies depression or anxiety  Denies any recent memory loss  Historical Information   Past Medical History:   Diagnosis Date    Anxiety     Asthma     cold weather, exercise induced     Past Surgical History:   Procedure Laterality Date    CHROMOSOME ANALYSIS, PRODUCTS OF CONCEPTION (HISTORICAL)      DILATION AND CURETTAGE OF UTERUS      d&E,     [place] COLONOSCOPY FLX DX W/COLLJ SPEC WHEN PFRMD N/A 10/31/2018    Procedure: EGD AND COLONOSCOPY;  Surgeon: Yecenia Patel MD;  Location: [facility]; Service: Gastroenterology    SOFT TISSUE MASS EXCISION      TONSILECTOMY, ADENOIDECTOMY, BILATERAL MYRINGOTOMY AND TUBES      WISDOM TOOTH EXTRACTION       Social History   Social History     Substance and Sexual Activity   Alcohol Use No    Comment: rarely     Social History     Substance and Sexual Activity   Drug Use No     Social History     Tobacco Use   Smoking Status Current Every Day Smoker    Packs/day: 0 25    Types: Cigarettes   Smokeless Tobacco Never Used     Family History   Problem Relation Age of Onset    Diabetes Mother     Hypertension Father     Heart disease Father     Stroke Father        Meds/Allergies       Current Outpatient Medications:     clonazePAM (KlonoPIN) 1 mg tablet    dicyclomine (BENTYL) 20 mg tablet    ibuprofen (MOTRIN) 800 mg tablet    Levonorgest-Eth Estrad -Day 0 15-0 03 &0 01 MG TABS    pantoprazole (PROTONIX) 40 mg tablet    Allergies   Allergen Reactions    Imitrex [Sumatriptan] Anaphylaxis    Penicillins Anaphylaxis    Codeine GI Intolerance    Doxycycline GI Intolerance    Zithromax [Azithromycin] GI Intolerance           Objective     Blood pressure 118/74, pulse 99, height 5' 4" (1 626 m), weight 69 3 kg (152 lb 12 8 oz), not currently breastfeeding   Body mass index is 26 23 kg/m²         PHYSICAL EXAM:      General Appearance:   Alert, cooperative, no distress   HEENT:   Normocephalic, atraumatic, anicteric      Neck:  Supple, symmetrical, trachea midline   Lungs:   Clear to auscultation bilaterally; no rales, rhonchi or wheezing; respirations unlabored    Heart[de-identified]   Regular rate and rhythm; no murmur, rub, or gallop  Abdomen:   Soft, non-tender, non-distended; normal bowel sounds; no masses, no organomegaly    Genitalia:   Deferred    Rectal:   Deferred    Extremities:  No cyanosis, clubbing or edema    Pulses:  2+ and symmetric    Skin:  No jaundice, rashes, or lesions    Lymph nodes:  No palpable cervical lymphadenopathy        Lab Results:   No visits with results within 1 Day(s) from this visit  Latest known visit with results is:   Orders Only on 04/15/2021   Component Date Value    SARS-CoV-2 04/15/2021 Negative          Radiology Results:   No results found

## 2021-04-21 NOTE — LETTER
April 21, 2021     Patient: Kwan Cazares   YOB: 1995   Date of Visit: 4/21/2021       To Whom it May Concern:    Jung Segura is under my professional care  She was seen in my office on 4/21/2021  She may return to work on 04/23/2021  If you have any questions or concerns, please don't hesitate to call           Sincerely,          Shashi Sorenson PA-C        CC: Chitra Mejia

## 2021-04-22 ENCOUNTER — TELEPHONE (OUTPATIENT)
Dept: GASTROENTEROLOGY | Facility: CLINIC | Age: 26
End: 2021-04-22

## 2021-04-22 DIAGNOSIS — K62.89 ANAL PAIN: Primary | ICD-10-CM

## 2021-04-22 DIAGNOSIS — K64.9 HEMORRHOIDS, UNSPECIFIED HEMORRHOID TYPE: ICD-10-CM

## 2021-04-22 RX ORDER — METRONIDAZOLE 500 MG/1
500 TABLET ORAL EVERY 8 HOURS SCHEDULED
Qty: 21 TABLET | Refills: 0 | Status: SHIPPED | OUTPATIENT
Start: 2021-04-22 | End: 2021-04-29

## 2021-04-22 RX ORDER — CIPROFLOXACIN 500 MG/1
500 TABLET, FILM COATED ORAL EVERY 12 HOURS SCHEDULED
Qty: 14 TABLET | Refills: 0 | Status: SHIPPED | OUTPATIENT
Start: 2021-04-22 | End: 2021-04-29

## 2021-04-22 NOTE — TELEPHONE ENCOUNTER
Spoke to patient    She is not much better  Offered pain control - she does not want this  Called in abx as she reports pus drainage  She will take tomorrow off - note will be emailed ot her  Referral placed to colorectal surgery

## 2021-04-22 NOTE — TELEPHONE ENCOUNTER
Patient would like to update you     She stated things are not improving     Please phone [phone]

## 2021-04-23 ENCOUNTER — TELEPHONE (OUTPATIENT)
Dept: GASTROENTEROLOGY | Facility: CLINIC | Age: 26
End: 2021-04-23

## 2021-04-23 NOTE — TELEPHONE ENCOUNTER
Jaqui Kaufman patient - Referral for colon & rectal surgery, the clinic cannot fit her in until November  Is there any where else that can take her insurance  Please return call to [phone]   Thx

## 2021-05-08 ENCOUNTER — APPOINTMENT (EMERGENCY)
Dept: CT IMAGING | Facility: HOSPITAL | Age: 26
End: 2021-05-08
Payer: COMMERCIAL

## 2021-05-08 ENCOUNTER — HOSPITAL ENCOUNTER (EMERGENCY)
Facility: HOSPITAL | Age: 26
Discharge: HOME | End: 2021-05-08
Attending: EMERGENCY MEDICINE | Admitting: EMERGENCY MEDICINE
Payer: COMMERCIAL

## 2021-05-08 ENCOUNTER — APPOINTMENT (EMERGENCY)
Dept: ULTRASOUND IMAGING | Facility: HOSPITAL | Age: 26
End: 2021-05-08
Payer: COMMERCIAL

## 2021-05-08 VITALS
OXYGEN SATURATION: 99 % | SYSTOLIC BLOOD PRESSURE: 112 MMHG | HEART RATE: 88 BPM | TEMPERATURE: 97.8 F | DIASTOLIC BLOOD PRESSURE: 67 MMHG | RESPIRATION RATE: 18 BRPM

## 2021-05-08 DIAGNOSIS — R10.2 PELVIC PAIN: Primary | ICD-10-CM

## 2021-05-08 DIAGNOSIS — N80.0 ADENOMYOSIS: ICD-10-CM

## 2021-05-08 LAB
ALBUMIN SERPL BCP-MCNC: 4 G/DL (ref 3.5–5)
ALP SERPL-CCNC: 77 U/L (ref 46–116)
ALT SERPL W P-5'-P-CCNC: 16 U/L (ref 12–78)
ANION GAP SERPL CALCULATED.3IONS-SCNC: 10 MMOL/L (ref 4–13)
AST SERPL W P-5'-P-CCNC: 8 U/L (ref 5–45)
BASOPHILS # BLD AUTO: 0.04 THOUSANDS/ΜL (ref 0–0.1)
BASOPHILS NFR BLD AUTO: 0 % (ref 0–1)
BILIRUB SERPL-MCNC: 0.25 MG/DL (ref 0.2–1)
BUN SERPL-MCNC: 13 MG/DL (ref 5–25)
CALCIUM SERPL-MCNC: 8.7 MG/DL (ref 8.3–10.1)
CHLORIDE SERPL-SCNC: 102 MMOL/L (ref 100–108)
CO2 SERPL-SCNC: 26 MMOL/L (ref 21–32)
CREAT SERPL-MCNC: 0.71 MG/DL (ref 0.6–1.3)
EOSINOPHIL # BLD AUTO: 0.17 THOUSAND/ΜL (ref 0–0.61)
EOSINOPHIL NFR BLD AUTO: 1 % (ref 0–6)
ERYTHROCYTE [DISTWIDTH] IN BLOOD BY AUTOMATED COUNT: 11.7 % (ref 11.6–15.1)
EXT PREG TEST URINE: NEGATIVE
EXT. CONTROL ED NAV: NORMAL
GFR SERPL CREATININE-BSD FRML MDRD: 118 ML/MIN/1.73SQ M
GLUCOSE SERPL-MCNC: 86 MG/DL (ref 65–140)
HCG SERPL QL: NEGATIVE
HCT VFR BLD AUTO: 42.3 % (ref 34.8–46.1)
HGB BLD-MCNC: 14 G/DL (ref 11.5–15.4)
IMM GRANULOCYTES # BLD AUTO: 0.05 THOUSAND/UL (ref 0–0.2)
IMM GRANULOCYTES NFR BLD AUTO: 0 % (ref 0–2)
LIPASE SERPL-CCNC: 70 U/L (ref 73–393)
LYMPHOCYTES # BLD AUTO: 2.47 THOUSANDS/ΜL (ref 0.6–4.47)
LYMPHOCYTES NFR BLD AUTO: 21 % (ref 14–44)
MCH RBC QN AUTO: 30.3 PG (ref 26.8–34.3)
MCHC RBC AUTO-ENTMCNC: 33.1 G/DL (ref 31.4–37.4)
MCV RBC AUTO: 92 FL (ref 82–98)
MONOCYTES # BLD AUTO: 0.62 THOUSAND/ΜL (ref 0.17–1.22)
MONOCYTES NFR BLD AUTO: 5 % (ref 4–12)
NEUTROPHILS # BLD AUTO: 8.4 THOUSANDS/ΜL (ref 1.85–7.62)
NEUTS SEG NFR BLD AUTO: 73 % (ref 43–75)
NRBC BLD AUTO-RTO: 0 /100 WBCS
PLATELET # BLD AUTO: 316 THOUSANDS/UL (ref 149–390)
PMV BLD AUTO: 9.9 FL (ref 8.9–12.7)
POTASSIUM SERPL-SCNC: 4.1 MMOL/L (ref 3.5–5.3)
PROT SERPL-MCNC: 7.4 G/DL (ref 6.4–8.2)
RBC # BLD AUTO: 4.62 MILLION/UL (ref 3.81–5.12)
SODIUM SERPL-SCNC: 138 MMOL/L (ref 136–145)
WBC # BLD AUTO: 11.75 THOUSAND/UL (ref 4.31–10.16)

## 2021-05-08 PROCEDURE — 74177 CT ABD & PELVIS W/CONTRAST: CPT

## 2021-05-08 PROCEDURE — 83690 ASSAY OF LIPASE: CPT | Performed by: EMERGENCY MEDICINE

## 2021-05-08 PROCEDURE — 36415 COLL VENOUS BLD VENIPUNCTURE: CPT

## 2021-05-08 PROCEDURE — 80053 COMPREHEN METABOLIC PANEL: CPT | Performed by: EMERGENCY MEDICINE

## 2021-05-08 PROCEDURE — 99284 EMERGENCY DEPT VISIT MOD MDM: CPT | Performed by: EMERGENCY MEDICINE

## 2021-05-08 PROCEDURE — 76830 TRANSVAGINAL US NON-OB: CPT

## 2021-05-08 PROCEDURE — 81025 URINE PREGNANCY TEST: CPT | Performed by: EMERGENCY MEDICINE

## 2021-05-08 PROCEDURE — 84703 CHORIONIC GONADOTROPIN ASSAY: CPT | Performed by: EMERGENCY MEDICINE

## 2021-05-08 PROCEDURE — G1004 CDSM NDSC: HCPCS

## 2021-05-08 PROCEDURE — 85025 COMPLETE CBC W/AUTO DIFF WBC: CPT | Performed by: EMERGENCY MEDICINE

## 2021-05-08 PROCEDURE — 99284 EMERGENCY DEPT VISIT MOD MDM: CPT

## 2021-05-08 PROCEDURE — 76856 US EXAM PELVIC COMPLETE: CPT

## 2021-05-08 PROCEDURE — 96361 HYDRATE IV INFUSION ADD-ON: CPT

## 2021-05-08 PROCEDURE — 96374 THER/PROPH/DIAG INJ IV PUSH: CPT

## 2021-05-08 RX ORDER — ONDANSETRON 2 MG/ML
4 INJECTION INTRAMUSCULAR; INTRAVENOUS ONCE
Status: COMPLETED | OUTPATIENT
Start: 2021-05-08 | End: 2021-05-08

## 2021-05-08 RX ADMIN — ONDANSETRON 4 MG: 2 INJECTION INTRAMUSCULAR; INTRAVENOUS at 18:10

## 2021-05-08 RX ADMIN — SODIUM CHLORIDE 1000 ML: 0.9 INJECTION, SOLUTION INTRAVENOUS at 18:11

## 2021-05-08 RX ADMIN — IOHEXOL 100 ML: 350 INJECTION, SOLUTION INTRAVENOUS at 18:41

## 2021-05-08 NOTE — Clinical Note
Mikayla Mina was seen and treated in our emergency department on 5/8/2021  Diagnosis:     Erick    She may return on this date: 05/09/2021         If you have any questions or concerns, please don't hesitate to call        Lorrie Phan RN    ______________________________           _______________          _______________  Hospital Representative                              Date                                Time

## 2021-05-08 NOTE — ED PROVIDER NOTES
History  Chief Complaint   Patient presents with    Pelvic Pain     pt co of pelvic pain r/t back, + vomiting, + painfull uriantion      Patient is a 58-year-old female past medical history of asthma, anxiety presenting with right-sided abdominal pain  Patient states that 2 hours ago while at work she began having right-sided abdominal pain which radiates to her back and over to the left side and is constant, better with stretching out putting her arms up, worse with movement  She states she took 800 mg ibuprofen prior to arrival with some improvement  She notes 1 episode of vomiting, nonbilious, nonbloody but denies any diarrhea constipation, vaginal discharge, vaginal bleeding, dysuria, hematuria, fevers, rashes, vision changes, headache, cough, respiratory symptoms  Last menstrual period was for/30, and did several days ago  Prior to Admission Medications   Prescriptions Last Dose Informant Patient Reported? Taking?    Levonorgest-Eth Estrad -Day 0 15-0 03 &0 01 MG TABS  Self Yes No   Sig: Take 1 tablet by mouth   clonazePAM (KlonoPIN) 1 mg tablet  Self Yes No   Sig: Take 1 mg by mouth daily   dicyclomine (BENTYL) 20 mg tablet   No No   Sig: TAKE 1 TABLET (20 MG TOTAL) BY MOUTH EVERY 6 (SIX) HOURS FOR 30 DAYS   ibuprofen (MOTRIN) 800 mg tablet   Yes No   Sig: Take 800 mg by mouth every 8 (eight) hours as needed   pantoprazole (PROTONIX) 40 mg tablet   No No   Sig: Take 1 tablet (40 mg total) by mouth daily   Patient not taking: Reported on 10/6/2020      Facility-Administered Medications: None       Past Medical History:   Diagnosis Date    Anxiety     Asthma     cold weather, exercise induced       Past Surgical History:   Procedure Laterality Date    CHROMOSOME ANALYSIS, PRODUCTS OF CONCEPTION (HISTORICAL)      DILATION AND CURETTAGE OF UTERUS      d&E,     [place] COLONOSCOPY FLX DX W/COLLJ SPEC WHEN PFRMD N/A 10/31/2018    Procedure: EGD AND COLONOSCOPY;  Surgeon: Tamera Dash MD; Location: [facility]; Service: Gastroenterology    SOFT TISSUE MASS EXCISION      TONSILECTOMY, ADENOIDECTOMY, BILATERAL MYRINGOTOMY AND TUBES      WISDOM TOOTH EXTRACTION         Family History   Problem Relation Age of Onset    Diabetes Mother     Hypertension Father     Heart disease Father     Stroke Father      I have reviewed and agree with the history as documented  E-Cigarette/Vaping    E-Cigarette Use Current Every Day User      E-Cigarette/Vaping Substances    Nicotine No     THC No     CBD No     Flavoring No     Other No     Unknown No      Social History     Tobacco Use    Smoking status: Current Every Day Smoker     Packs/day: 0 25     Types: Cigarettes    Smokeless tobacco: Never Used   Substance Use Topics    Alcohol use: No     Comment: rarely    Drug use: No       Review of Systems   All other systems reviewed and are negative  Physical Exam  Physical Exam  Vitals signs reviewed  Constitutional:       General: She is not in acute distress  Appearance: Normal appearance  She is not ill-appearing  HENT:      Mouth/Throat:      Mouth: Mucous membranes are moist    Eyes:      Conjunctiva/sclera: Conjunctivae normal    Neck:      Musculoskeletal: Neck supple  Cardiovascular:      Rate and Rhythm: Normal rate and regular rhythm  Heart sounds: Normal heart sounds  Pulmonary:      Effort: Pulmonary effort is normal       Breath sounds: Normal breath sounds  Abdominal:      General: Abdomen is flat  Palpations: Abdomen is soft  Tenderness: There is abdominal tenderness  There is guarding  There is no right CVA tenderness or left CVA tenderness  Comments: Patient has right upper and lower quadrant tenderness with guarding   Musculoskeletal: Normal range of motion  General: No swelling  Skin:     General: Skin is warm and dry  Neurological:      General: No focal deficit present  Mental Status: She is alert     Psychiatric: Mood and Affect: Mood normal          Vital Signs  ED Triage Vitals [05/08/21 1616]   Temperature Pulse Respirations Blood Pressure SpO2   97 8 °F (36 6 °C) (!) 106 20 119/70 100 %      Temp Source Heart Rate Source Patient Position - Orthostatic VS BP Location FiO2 (%)   Oral Monitor Sitting Left arm --      Pain Score       --           Vitals:    05/08/21 1616 05/08/21 1812   BP: 119/70 112/67   Pulse: (!) 106 88   Patient Position - Orthostatic VS: Sitting Lying         Visual Acuity      ED Medications  Medications   sodium chloride 0 9 % bolus 1,000 mL (0 mL Intravenous Stopped 5/8/21 1925)   ondansetron (ZOFRAN) injection 4 mg (4 mg Intravenous Given 5/8/21 1810)   iohexol (OMNIPAQUE) 350 MG/ML injection (SINGLE-DOSE) 100 mL (100 mL Intravenous Given 5/8/21 1841)       Diagnostic Studies  Results Reviewed     Procedure Component Value Units Date/Time    POCT pregnancy, urine [[id number]]  (Normal) Resulted: 05/08/21 1814    Lab Status: Final result Updated: 05/08/21 1814     EXT PREG TEST UR (Ref: Negative) negative     Control valid    hCG, qualitative pregnancy [[id number]]  (Normal) Collected: 05/08/21 1618    Lab Status: Final result Specimen: Blood from Arm, Left Updated: 05/08/21 1741     Preg, Serum Negative    Comprehensive metabolic panel [[id number]] Collected: 05/08/21 1618    Lab Status: Final result Specimen: Blood from Arm, Left Updated: 05/08/21 1647     Sodium 138 mmol/L      Potassium 4 1 mmol/L      Chloride 102 mmol/L      CO2 26 mmol/L      ANION GAP 10 mmol/L      BUN 13 mg/dL      Creatinine 0 71 mg/dL      Glucose 86 mg/dL      Calcium 8 7 mg/dL      AST 8 U/L      ALT 16 U/L      Alkaline Phosphatase 77 U/L      Total Protein 7 4 g/dL      Albumin 4 0 g/dL      Total Bilirubin 0 25 mg/dL      eGFR 118 ml/min/1 73sq m     Narrative:      [place] guidelines for Chronic Kidney Disease (CKD):     Stage 1 with normal or high GFR (GFR > 90 mL/min/1 73 square meters)    Stage 2 Mild CKD (GFR = 60-89 mL/min/1 73 square meters)    Stage 3A Moderate CKD (GFR = 45-59 mL/min/1 73 square meters)    Stage 3B Moderate CKD (GFR = 30-44 mL/min/1 73 square meters)    Stage 4 Severe CKD (GFR = 15-29 mL/min/1 73 square meters)    Stage 5 End Stage CKD (GFR <15 mL/min/1 73 square meters)  Note: GFR calculation is accurate only with a steady state creatinine    Lipase [[id number]]  (Abnormal) Collected: 05/08/21 1618    Lab Status: Final result Specimen: Blood from Arm, Left Updated: 05/08/21 1647     Lipase 70 u/L     CBC and differential [[id number]]  (Abnormal) Collected: 05/08/21 1618    Lab Status: Final result Specimen: Blood from Arm, Left Updated: 05/08/21 1633     WBC 11 75 Thousand/uL      RBC 4 62 Million/uL      Hemoglobin 14 0 g/dL      Hematocrit 42 3 %      MCV 92 fL      MCH 30 3 pg      MCHC 33 1 g/dL      RDW 11 7 %      MPV 9 9 fL      Platelets 751 Thousands/uL      nRBC 0 /100 WBCs      Neutrophils Relative 73 %      Immat GRANS % 0 %      Lymphocytes Relative 21 %      Monocytes Relative 5 %      Eosinophils Relative 1 %      Basophils Relative 0 %      Neutrophils Absolute 8 40 Thousands/µL      Immature Grans Absolute 0 05 Thousand/uL      Lymphocytes Absolute 2 47 Thousands/µL      Monocytes Absolute 0 62 Thousand/µL      Eosinophils Absolute 0 17 Thousand/µL      Basophils Absolute 0 04 Thousands/µL                  CT abdomen pelvis with contrast   Final Result by Matthew Up MD (05/08 1908)         1  Normal appendix  No evidence of bowel obstruction, colitis or diverticulitis  2   Prominent follicle in the right ovary  No evidence of pathologic cyst or fluid in the pelvis  Workstation performed: [id number]         US pelvis complete w transvaginal   Final Result by Flakita Johnson MD (05/08 1830)       Dominant simple follicular cyst on the right  Subtle findings suggesting probable mild adenomyosis        No acute abnormality identified  Workstation performed: [id number]                    Procedures  Procedures         ED Course  ED Course as of May 08 2152   Sat May 08, 2021   1919 Patient notes no change her pain, ultrasound shows right-sided dominant follicle as well as possible adenomyosis, CT unremarkable  Have advised prompt OBGYN follow-up and patient states that she is supposed to have a transvaginal ultrasound on Monday, in 2 days  Have discussed return precautions, NSAID dosing at home, patient continues to deny pain control here and will discharge with OBGYN follow-up  1920 Patient states that she cannot urinary now she urinated prior to her ultrasound however as there are other causes identified on ultrasound and patient is not reporting any urinary symptoms  feel that she is still safe for discharge at this time have discussed this with her and she states she is comfortable with that  SBIRT 22yo+      Most Recent Value   SBIRT (22 yo +)   In order to provide better care to our patients, we are screening all of our patients for alcohol and drug use  Would it be okay to ask you these screening questions? Yes Filed at: 05/08/2021 1814   Initial Alcohol Screen: US AUDIT-C    1  How often do you have a drink containing alcohol?  0 Filed at: 05/08/2021 1814   2  How many drinks containing alcohol do you have on a typical day you are drinking? 0 Filed at: 05/08/2021 1814   3a  Male UNDER 65: How often do you have five or more drinks on one occasion? 0 Filed at: 05/08/2021 1814   3b  FEMALE Any Age, or MALE 65+: How often do you have 4 or more drinks on one occassion? 0 Filed at: 05/08/2021 1814   Audit-C Score  0 Filed at: 05/08/2021 1814   GALE: How many times in the past year have you    Used an illegal drug or used a prescription medication for non-medical reasons?   Never Filed at: 05/08/2021 1814                    MDM  Number of Diagnoses or Management Options  Diagnosis management comments: Patient is a 21-year-old female past medical history of anxiety, asthma presenting with right-sided abdominal pain  Patient is well-appearing bedside with stable vitals and no acute distress  She has low-grade tachycardia as well as right upper and lower quadrant pain with guarding in both quadrants  Have concern for appendicitis versus cholelithiasis versus ovarian pathology and will obtain CT, transvaginal ultrasound administer fluids, antiemetic, awaiting urine and pregnancy test, 1st nurse abdominal labs so far unremarkable, and patient has declined pain control at this time  Disposition  Final diagnoses:   Pelvic pain   Adenomyosis     Time reflects when diagnosis was documented in both MDM as applicable and the Disposition within this note     Time User Action Codes Description Comment    5/8/2021  7:19 PM  Hash Add [R10 2] Pelvic pain     5/8/2021  7:19 PM  Hash Add [N80 0] Adenomyosis       ED Disposition     ED Disposition Condition Date/Time Comment    Discharge Stable Sat May 8, 2021  7:19  [address] discharge to home/self care              Follow-up Information     Follow up With Specialties Details Why Contact Info Additional Information    Debi Lowery MD Internal Medicine In 1 week  [address] [place] [address] Obstetrics and Gynecology Schedule an appointment as soon as possible for a visit in 1 week  [address] [phone] [address] [place] [address], /TERESITA Herbert, [address] [place] [address]   [phone]          Discharge Medication List as of 5/8/2021  7:20 PM      CONTINUE these medications which have NOT CHANGED    Details   clonazePAM (KlonoPIN) 1 mg tablet Take 1 mg by mouth daily, Starting Tue 10/23/2018, Historical Med      dicyclomine (BENTYL) 20 mg tablet TAKE 1 TABLET (20 MG TOTAL) BY MOUTH EVERY 6 (SIX) HOURS FOR 30 DAYS, Starting Tue 11/20/2018, Until Thu 12/20/2018, Normal      ibuprofen (MOTRIN) 800 mg tablet Take 800 mg by mouth every 8 (eight) hours as needed, Starting Sat 2/1/2020, Until Sun 1/31/2021, Historical Med      Levonorgest-Eth Estrad 91-Day 0 15-0 03 &0 01 MG TABS Take 1 tablet by mouth, Starting Mon 7/2/2018, Until Tue 7/2/2019, Historical Med      pantoprazole (PROTONIX) 40 mg tablet Take 1 tablet (40 mg total) by mouth daily, Starting Wed 10/24/2018, Normal           No discharge procedures on file      PDMP Review     None          ED Provider  Electronically Signed by           Kb Tatum DO  05/08/21 1356

## 2021-05-08 NOTE — DISCHARGE INSTRUCTIONS
Adenomyosis is growth of uterine lining into the muscle tissue of the uterus      Please return to the emergency department if you experience the following:  Worsened or changed abdominal or pelvic pain   Chest pain  Difficulty breathing  Uncontrollable vomiting  Lightheadedness/passing out  Fevers

## 2021-10-07 ENCOUNTER — NURSE TRIAGE (OUTPATIENT)
Dept: OTHER | Facility: OTHER | Age: 26
End: 2021-10-07

## 2022-01-10 DIAGNOSIS — R19.7 DIARRHEA, UNSPECIFIED TYPE: ICD-10-CM

## 2022-01-10 DIAGNOSIS — R10.13 EPIGASTRIC PAIN: Primary | ICD-10-CM

## 2022-01-10 DIAGNOSIS — R10.30 LOWER ABDOMINAL PAIN: ICD-10-CM

## 2022-01-10 NOTE — TELEPHONE ENCOUNTER
Rosamaria Moy pt-  Patient is experiencing epigastric pain, diarrhea, bloating and a hard stomach     She has scheduled an office appt 01/31 with Rosamaria Moy     Uses: [phone]  Please phone [phone] to advise

## 2022-01-11 RX ORDER — PANTOPRAZOLE SODIUM 40 MG/1
40 TABLET, DELAYED RELEASE ORAL DAILY
Qty: 30 TABLET | Refills: 1 | Status: SHIPPED | OUTPATIENT
Start: 2022-01-11 | End: 2022-03-12

## 2022-01-11 RX ORDER — DICYCLOMINE HCL 20 MG
20 TABLET ORAL EVERY 6 HOURS
Qty: 120 TABLET | Refills: 0 | Status: SHIPPED | OUTPATIENT
Start: 2022-01-11 | End: 2022-02-10

## 2022-01-11 NOTE — TELEPHONE ENCOUNTER
ARMIDA: Spoke with patient  History of GERD, IBS-D, external hemorrhoids  Patient c/o upper abdominal fullness, epigastric pain, bloating, and heartburn  Passing 1 soft formed BM daily  Patient states she would like another course of her pantoprazole and bentyl  Advised we can send this  Also provided phone number for colorectal as her hemorrhoids continue to cause her discomfort  Denies nausea, vomiting, SOB, weakness, black or bloody stools

## 2022-05-23 ENCOUNTER — OFFICE VISIT (OUTPATIENT)
Dept: URGENT CARE | Facility: CLINIC | Age: 27
End: 2022-05-23
Payer: COMMERCIAL

## 2022-05-23 ENCOUNTER — APPOINTMENT (OUTPATIENT)
Dept: RADIOLOGY | Facility: CLINIC | Age: 27
End: 2022-05-23
Payer: COMMERCIAL

## 2022-05-23 VITALS
OXYGEN SATURATION: 100 % | SYSTOLIC BLOOD PRESSURE: 112 MMHG | TEMPERATURE: 98.2 F | DIASTOLIC BLOOD PRESSURE: 68 MMHG | RESPIRATION RATE: 18 BRPM | HEART RATE: 107 BPM

## 2022-05-23 DIAGNOSIS — S90.32XA CONTUSION OF LEFT FOOT, INITIAL ENCOUNTER: ICD-10-CM

## 2022-05-23 DIAGNOSIS — S90.32XA CONTUSION OF LEFT FOOT, INITIAL ENCOUNTER: Primary | ICD-10-CM

## 2022-05-23 PROCEDURE — 73630 X-RAY EXAM OF FOOT: CPT

## 2022-05-23 PROCEDURE — 99213 OFFICE O/P EST LOW 20 MIN: CPT | Performed by: PHYSICIAN ASSISTANT

## 2022-05-23 NOTE — PROGRESS NOTES
[facility]        NAME: Glenn Mercer is a 31 y o  female  : 1995    MRN: [id number]  DATE: May 23, 2022  TIME: 7:55 PM    Assessment and Plan   Contusion of left foot, initial encounter [S90 32XA]  1  Contusion of left foot, initial encounter  XR foot 3+ vw left     Patient refused crutches    Patient Instructions   Patient Instructions   No acute fracture noted on x-ray on preliminary read  Radiologist readings will be in tomorrow please call for the results tomorrow morning  Follow up with PCP in 3-5 days  Proceed to  ER if symptoms worsen  Chief Complaint     Chief Complaint   Patient presents with    dropped wrench of foot         History of Present Illness       Patient presents with left foot pain after her 2year-old dropped a wrench on her foot  She developed pain immediately after and now cannot bear weight on the foot and is having pain and difficulty wiggling the toes  Has some bruising over the area that was hit by the wrench  Review of Systems   Review of Systems   Constitutional: Negative for fatigue  Musculoskeletal: Positive for gait problem and joint swelling  Negative for arthralgias  Skin: Negative for wound  Neurological: Negative for weakness  Psychiatric/Behavioral: Negative for confusion           Current Medications       Current Outpatient Medications:     clonazePAM (KlonoPIN) 1 mg tablet, Take 1 mg by mouth daily (Patient not taking: Reported on 2022), Disp: , Rfl:     dicyclomine (BENTYL) 20 mg tablet, Take 1 tablet (20 mg total) by mouth every 6 (six) hours, Disp: 120 tablet, Rfl: 0    ibuprofen (MOTRIN) 800 mg tablet, Take 800 mg by mouth every 8 (eight) hours as needed, Disp: , Rfl:     Levonorgest-Eth Estrad -Day 0 15-0 03 &0 01 MG TABS, Take 1 tablet by mouth, Disp: , Rfl:     pantoprazole (PROTONIX) 40 mg tablet, Take 1 tablet (40 mg total) by mouth daily, Disp: 30 tablet, Rfl: 1    Current Allergies     Allergies as of 2022 - Reviewed 2022   Allergen Reaction Noted    Imitrex [sumatriptan] Anaphylaxis 10/06/2020    Penicillins Anaphylaxis 10/16/2018    Codeine GI Intolerance 10/16/2018    Doxycycline GI Intolerance 10/16/2018    Zithromax [azithromycin] GI Intolerance 10/16/2018            The following portions of the patient's history were reviewed and updated as appropriate: allergies, current medications, past family history, past medical history, past social history, past surgical history and problem list      Past Medical History:   Diagnosis Date    Anxiety     Asthma     cold weather, exercise induced       Past Surgical History:   Procedure Laterality Date    CHROMOSOME ANALYSIS, PRODUCTS OF CONCEPTION (HISTORICAL)      DILATION AND CURETTAGE OF UTERUS      d&E,     [place] COLONOSCOPY FLX DX W/COLLJ SPEC WHEN PFRMD N/A 10/31/2018    Procedure: EGD AND COLONOSCOPY;  Surgeon: Erik Schulte MD;  Location: [facility]; Service: Gastroenterology    SOFT TISSUE MASS EXCISION      TONSILECTOMY, ADENOIDECTOMY, BILATERAL MYRINGOTOMY AND TUBES      WISDOM TOOTH EXTRACTION         Family History   Problem Relation Age of Onset    Diabetes Mother     Hypertension Father     Heart disease Father     Stroke Father          Medications have been verified  Objective   /68   Pulse (!) 107   Temp 98 2 °F (36 8 °C)   Resp 18   SpO2 100%        Physical Exam     Physical Exam  Constitutional:       Appearance: Normal appearance  Cardiovascular:      Pulses: Normal pulses  Musculoskeletal:         General: Tenderness present  No swelling or deformity  Comments: Mild ecchymosis on approximately 1 cm area over the medial portion of the dorsum of the midfoot  No significant deformity, swelling noted  Patient is tender in the toes and over the entire foot  Nontender over the ankle  Full active range of motion of the ankle  Patient is neurovascularly intact distally     Skin: General: Skin is warm  Capillary Refill: Capillary refill takes less than 2 seconds  Neurological:      Mental Status: She is alert     Psychiatric:         Mood and Affect: Mood normal          Behavior: Behavior normal

## 2022-05-23 NOTE — PATIENT INSTRUCTIONS
No acute fracture noted on x-ray on preliminary read  Radiologist readings will be in tomorrow please call for the results tomorrow morning

## 2022-09-17 ENCOUNTER — APPOINTMENT (OUTPATIENT)
Dept: ULTRASOUND IMAGING | Facility: HOSPITAL | Age: 27
End: 2022-09-17
Payer: COMMERCIAL

## 2022-09-17 ENCOUNTER — HOSPITAL ENCOUNTER (EMERGENCY)
Facility: HOSPITAL | Age: 27
Discharge: HOME | End: 2022-09-17
Attending: EMERGENCY MEDICINE | Admitting: EMERGENCY MEDICINE
Payer: COMMERCIAL

## 2022-09-17 VITALS
HEIGHT: 64 IN | BODY MASS INDEX: 25.95 KG/M2 | TEMPERATURE: 99.1 F | HEART RATE: 90 BPM | DIASTOLIC BLOOD PRESSURE: 79 MMHG | WEIGHT: 152 LBS | OXYGEN SATURATION: 100 % | SYSTOLIC BLOOD PRESSURE: 122 MMHG | RESPIRATION RATE: 18 BRPM

## 2022-09-17 DIAGNOSIS — R10.30 LOWER ABDOMINAL PAIN: ICD-10-CM

## 2022-09-17 DIAGNOSIS — N93.9 VAGINAL BLEEDING: ICD-10-CM

## 2022-09-17 DIAGNOSIS — O20.9 VAGINAL BLEEDING AFFECTING EARLY PREGNANCY: ICD-10-CM

## 2022-09-17 DIAGNOSIS — O20.0 THREATENED MISCARRIAGE IN EARLY PREGNANCY: Primary | ICD-10-CM

## 2022-09-17 LAB
ALBUMIN SERPL BCP-MCNC: 3.8 G/DL (ref 3.5–5)
ALP SERPL-CCNC: 61 U/L (ref 46–116)
ALT SERPL W P-5'-P-CCNC: 24 U/L (ref 12–78)
ANION GAP SERPL CALCULATED.3IONS-SCNC: 8 MMOL/L (ref 4–13)
AST SERPL W P-5'-P-CCNC: 13 U/L (ref 5–45)
B-HCG SERPL-ACNC: ABNORMAL MIU/ML
BACTERIA UR QL AUTO: ABNORMAL /HPF
BASOPHILS # BLD AUTO: 0.05 THOUSANDS/ΜL (ref 0–0.1)
BASOPHILS NFR BLD AUTO: 1 % (ref 0–1)
BILIRUB SERPL-MCNC: 0.14 MG/DL (ref 0.2–1)
BILIRUB UR QL STRIP: NEGATIVE
BUN SERPL-MCNC: 10 MG/DL (ref 5–25)
CALCIUM SERPL-MCNC: 8.8 MG/DL (ref 8.3–10.1)
CHLORIDE SERPL-SCNC: 102 MMOL/L (ref 96–108)
CLARITY UR: CLEAR
CO2 SERPL-SCNC: 26 MMOL/L (ref 21–32)
COLOR UR: YELLOW
CREAT SERPL-MCNC: 0.6 MG/DL (ref 0.6–1.3)
EOSINOPHIL # BLD AUTO: 0.23 THOUSAND/ΜL (ref 0–0.61)
EOSINOPHIL NFR BLD AUTO: 3 % (ref 0–6)
ERYTHROCYTE [DISTWIDTH] IN BLOOD BY AUTOMATED COUNT: 11.9 % (ref 11.6–15.1)
EXT PREG TEST URINE: POSITIVE
EXT. CONTROL ED NAV: ABNORMAL
GFR SERPL CREATININE-BSD FRML MDRD: 125 ML/MIN/1.73SQ M
GLUCOSE SERPL-MCNC: 79 MG/DL (ref 65–140)
GLUCOSE UR STRIP-MCNC: NEGATIVE MG/DL
HCT VFR BLD AUTO: 36.8 % (ref 34.8–46.1)
HGB BLD-MCNC: 12.4 G/DL (ref 11.5–15.4)
HGB UR QL STRIP.AUTO: ABNORMAL
IMM GRANULOCYTES # BLD AUTO: 0.04 THOUSAND/UL (ref 0–0.2)
IMM GRANULOCYTES NFR BLD AUTO: 1 % (ref 0–2)
KETONES UR STRIP-MCNC: ABNORMAL MG/DL
LEUKOCYTE ESTERASE UR QL STRIP: NEGATIVE
LIPASE SERPL-CCNC: 72 U/L (ref 73–393)
LYMPHOCYTES # BLD AUTO: 1.83 THOUSANDS/ΜL (ref 0.6–4.47)
LYMPHOCYTES NFR BLD AUTO: 21 % (ref 14–44)
MCH RBC QN AUTO: 31.1 PG (ref 26.8–34.3)
MCHC RBC AUTO-ENTMCNC: 33.7 G/DL (ref 31.4–37.4)
MCV RBC AUTO: 92 FL (ref 82–98)
MONOCYTES # BLD AUTO: 0.82 THOUSAND/ΜL (ref 0.17–1.22)
MONOCYTES NFR BLD AUTO: 9 % (ref 4–12)
MUCOUS THREADS UR QL AUTO: ABNORMAL
NEUTROPHILS # BLD AUTO: 5.75 THOUSANDS/ΜL (ref 1.85–7.62)
NEUTS SEG NFR BLD AUTO: 65 % (ref 43–75)
NITRITE UR QL STRIP: NEGATIVE
NON-SQ EPI CELLS URNS QL MICRO: ABNORMAL /HPF
NRBC BLD AUTO-RTO: 0 /100 WBCS
PH UR STRIP.AUTO: 6.5 [PH]
PLATELET # BLD AUTO: 260 THOUSANDS/UL (ref 149–390)
PMV BLD AUTO: 9.7 FL (ref 8.9–12.7)
POTASSIUM SERPL-SCNC: 3.5 MMOL/L (ref 3.5–5.3)
PROT SERPL-MCNC: 7.5 G/DL (ref 6.4–8.4)
PROT UR STRIP-MCNC: NEGATIVE MG/DL
RBC # BLD AUTO: 3.99 MILLION/UL (ref 3.81–5.12)
RBC #/AREA URNS AUTO: ABNORMAL /HPF
SODIUM SERPL-SCNC: 136 MMOL/L (ref 135–147)
SP GR UR STRIP.AUTO: 1.01 (ref 1–1.03)
UROBILINOGEN UR QL STRIP.AUTO: 1 E.U./DL
WBC # BLD AUTO: 8.72 THOUSAND/UL (ref 4.31–10.16)
WBC #/AREA URNS AUTO: ABNORMAL /HPF

## 2022-09-17 PROCEDURE — 96360 HYDRATION IV INFUSION INIT: CPT

## 2022-09-17 PROCEDURE — 36415 COLL VENOUS BLD VENIPUNCTURE: CPT | Performed by: EMERGENCY MEDICINE

## 2022-09-17 PROCEDURE — 81025 URINE PREGNANCY TEST: CPT | Performed by: EMERGENCY MEDICINE

## 2022-09-17 PROCEDURE — 76801 OB US < 14 WKS SINGLE FETUS: CPT

## 2022-09-17 PROCEDURE — 85025 COMPLETE CBC W/AUTO DIFF WBC: CPT | Performed by: EMERGENCY MEDICINE

## 2022-09-17 PROCEDURE — 81001 URINALYSIS AUTO W/SCOPE: CPT | Performed by: EMERGENCY MEDICINE

## 2022-09-17 PROCEDURE — 76815 OB US LIMITED FETUS(S): CPT | Performed by: EMERGENCY MEDICINE

## 2022-09-17 PROCEDURE — 99284 EMERGENCY DEPT VISIT MOD MDM: CPT

## 2022-09-17 PROCEDURE — 80053 COMPREHEN METABOLIC PANEL: CPT | Performed by: EMERGENCY MEDICINE

## 2022-09-17 PROCEDURE — 87086 URINE CULTURE/COLONY COUNT: CPT | Performed by: EMERGENCY MEDICINE

## 2022-09-17 PROCEDURE — 99285 EMERGENCY DEPT VISIT HI MDM: CPT | Performed by: EMERGENCY MEDICINE

## 2022-09-17 PROCEDURE — 83690 ASSAY OF LIPASE: CPT | Performed by: EMERGENCY MEDICINE

## 2022-09-17 PROCEDURE — 84702 CHORIONIC GONADOTROPIN TEST: CPT | Performed by: EMERGENCY MEDICINE

## 2022-09-17 RX ADMIN — SODIUM CHLORIDE 1000 ML: 0.9 INJECTION, SOLUTION INTRAVENOUS at 16:51

## 2022-09-17 NOTE — DISCHARGE INSTRUCTIONS
Please follow up PCP and OB/gyn  Can also consider following up with [facility]  Recommend tylenol 650 mg every 6 hours as needed for pain  Please return for severe chest pain, significant shortness of breath, severely worsening symptoms, or any other concerning signs or symptoms  Please refer to the following documents for additional instructions and return precautions  Single intrauterine gestation at 7 weeks 1 day (range +/- 1 week), however no fetal heart activity identified  VASQUEZ of 5/5/2023    Based on current consensus literature terminology, this is a nonviable intrauterine pregnancy

## 2022-09-17 NOTE — ED PROVIDER NOTES
History  Chief Complaint   Patient presents with    Vaginal Bleeding - Pregnant     Pt c/o "vaginal bleeding since last Tuesday and pressure in lower abdomen radiating to lower back " Pt is 7 weeks pregnant  39-year-old female  presenting at approximately 7 weeks gestation with abdominal pain and vaginal bleeding  Unwanted pregnancy  Patient reports that she has been having intermittent vaginal bleeding and passage of clots for the last couple of weeks  Follows with [facility]  Was originally scheduled to have hysterectomy in a couple months prior to this current pregnancy  Had transvaginal ultrasound per [facility] everywhere 2 days ago showing live pregnancy with heart rate  Patient presenting today due to ongoing symptoms  Patient is known Rh negative but currently denying any RhoGAM given that she does not intend to have additional pregnancies and already had hysterectomy scheduled  Reports intermittent nausea vomiting  Denies any current nausea  Reports diffuse lower abdominal and lower back pressure  Denies any other complaints  Past Medical History:  No date: Anxiety  No date: Asthma      Comment:  cold weather, exercise induced  Family History: non-contributory  Social History            Prior to Admission Medications   Prescriptions Last Dose Informant Patient Reported? Taking?    Levonorgest-Eth Estrad -Day 0 15-0 03 &0 01 MG TABS  Self Yes No   Sig: Take 1 tablet by mouth   clonazePAM (KlonoPIN) 1 mg tablet  Self Yes No   Sig: Take 1 mg by mouth daily   Patient not taking: Reported on 2022   dicyclomine (BENTYL) 20 mg tablet   No No   Sig: Take 1 tablet (20 mg total) by mouth every 6 (six) hours   ibuprofen (MOTRIN) 800 mg tablet   Yes No   Sig: Take 800 mg by mouth every 8 (eight) hours as needed   pantoprazole (PROTONIX) 40 mg tablet   No No   Sig: Take 1 tablet (40 mg total) by mouth daily      Facility-Administered Medications: None       Past Medical History: Diagnosis Date    Anxiety     Asthma     cold weather, exercise induced       Past Surgical History:   Procedure Laterality Date    CHROMOSOME ANALYSIS, PRODUCTS OF CONCEPTION (HISTORICAL)      DILATION AND CURETTAGE OF UTERUS      d&E,     MA COLONOSCOPY FLX DX W/COLLJ SPEC WHEN PFRMD N/A 10/31/2018    Procedure: EGD AND COLONOSCOPY;  Surgeon: Tamera Dash MD;  Location: [facility]; Service: Gastroenterology    SOFT TISSUE MASS EXCISION      TONSILECTOMY, ADENOIDECTOMY, BILATERAL MYRINGOTOMY AND TUBES      WISDOM TOOTH EXTRACTION         Family History   Problem Relation Age of Onset    Diabetes Mother     Hypertension Father     Heart disease Father     Stroke Father      I have reviewed and agree with the history as documented  E-Cigarette/Vaping    E-Cigarette Use Current Every Day User      E-Cigarette/Vaping Substances    Nicotine No     THC No     CBD No     Flavoring No     Other No     Unknown No      Social History     Tobacco Use    Smoking status: Former Smoker     Packs/day: 0 25     Types: Cigarettes    Smokeless tobacco: Never Used   Vaping Use    Vaping Use: Every day   Substance Use Topics    Alcohol use: No     Comment: rarely    Drug use: No       Review of Systems   Constitutional: Negative for appetite change, chills, diaphoresis, fever and unexpected weight change  HENT: Negative for congestion and rhinorrhea  Eyes: Negative for photophobia and visual disturbance  Respiratory: Negative for cough, chest tightness and shortness of breath  Cardiovascular: Negative for chest pain, palpitations and leg swelling  Gastrointestinal: Positive for abdominal pain, nausea and vomiting  Negative for abdominal distention, blood in stool, constipation and diarrhea  Genitourinary: Positive for vaginal bleeding  Negative for dysuria and hematuria  Musculoskeletal: Negative for back pain, joint swelling, neck pain and neck stiffness     Skin: Negative for color change, pallor, rash and wound  Neurological: Negative for dizziness, syncope, weakness, light-headedness and headaches  Psychiatric/Behavioral: Negative for agitation  All other systems reviewed and are negative  Physical Exam  Physical Exam  Vitals and nursing note reviewed  Constitutional:       General: She is not in acute distress  Appearance: Normal appearance  She is well-developed  She is not ill-appearing, toxic-appearing or diaphoretic  HENT:      Head: Normocephalic and atraumatic  Nose: Nose normal  No congestion or rhinorrhea  Mouth/Throat:      Mouth: Mucous membranes are moist       Pharynx: Oropharynx is clear  No oropharyngeal exudate or posterior oropharyngeal erythema  Eyes:      General: No scleral icterus  Right eye: No discharge  Left eye: No discharge  Extraocular Movements: Extraocular movements intact  Conjunctiva/sclera: Conjunctivae normal       Pupils: Pupils are equal, round, and reactive to light  Neck:      Vascular: No JVD  Trachea: No tracheal deviation  Comments: Supple  Normal range of motion  Cardiovascular:      Rate and Rhythm: Normal rate and regular rhythm  Heart sounds: Normal heart sounds  No murmur heard  No friction rub  No gallop  Comments: Normal rate and regular rhythm  Pulmonary:      Effort: Pulmonary effort is normal  No respiratory distress  Breath sounds: Normal breath sounds  No stridor  No wheezing or rales  Comments: Clear to auscultation bilaterally  Chest:      Chest wall: No tenderness  Abdominal:      General: Bowel sounds are normal  There is no distension  Palpations: Abdomen is soft  Tenderness: There is abdominal tenderness  There is no right CVA tenderness, left CVA tenderness, guarding or rebound  Comments: Diffuse lower abdominal tenderness palpation  Otherwise soft and nondistended    Normal bowel sounds throughout   Musculoskeletal: General: No swelling, tenderness, deformity or signs of injury  Normal range of motion  Cervical back: Normal range of motion and neck supple  No rigidity  No muscular tenderness  Right lower leg: No edema  Left lower leg: No edema  Lymphadenopathy:      Cervical: No cervical adenopathy  Skin:     General: Skin is warm and dry  Coloration: Skin is not pale  Findings: No erythema or rash  Neurological:      General: No focal deficit present  Mental Status: She is alert  Mental status is at baseline  Sensory: No sensory deficit  Motor: No weakness or abnormal muscle tone  Coordination: Coordination normal       Gait: Gait normal       Comments: Alert  Strength and sensation grossly intact  Ambulatory without difficulty at baseline  Psychiatric:         Behavior: Behavior normal          Thought Content:  Thought content normal          Vital Signs  ED Triage Vitals   Temperature Pulse Respirations Blood Pressure SpO2   09/17/22 1548 09/17/22 1548 09/17/22 1548 09/17/22 1905 09/17/22 1548   99 1 °F (37 3 °C) 100 18 122/79 99 %      Temp Source Heart Rate Source Patient Position - Orthostatic VS BP Location FiO2 (%)   09/17/22 1548 09/17/22 1548 09/17/22 1548 09/17/22 1548 --   Temporal Monitor Sitting Left arm       Pain Score       --                  Vitals:    09/17/22 1548 09/17/22 1905   BP:  122/79   Pulse: 100 90   Patient Position - Orthostatic VS: Sitting          Visual Acuity      ED Medications  Medications   sodium chloride 0 9 % bolus 1,000 mL (0 mL Intravenous Stopped 9/17/22 1751)       Diagnostic Studies  Results Reviewed     Procedure Component Value Units Date/Time    CBC and differential [[id number]] Collected: 09/17/22 1736    Lab Status: Final result Specimen: Blood from Arm, Left Updated: 09/17/22 1738     WBC 8 72 Thousand/uL      RBC 3 99 Million/uL      Hemoglobin 12 4 g/dL      Hematocrit 36 8 %      MCV 92 fL      MCH 31 1 pg MCHC 33 7 g/dL      RDW 11 9 %      MPV 9 7 fL      Platelets 273 Thousands/uL      nRBC 0 /100 WBCs      Neutrophils Relative 65 %      Immat GRANS % 1 %      Lymphocytes Relative 21 %      Monocytes Relative 9 %      Eosinophils Relative 3 %      Basophils Relative 1 %      Neutrophils Absolute 5 75 Thousands/µL      Immature Grans Absolute 0 04 Thousand/uL      Lymphocytes Absolute 1 83 Thousands/µL      Monocytes Absolute 0 82 Thousand/µL      Eosinophils Absolute 0 23 Thousand/µL      Basophils Absolute 0 05 Thousands/µL     Narrative: This is an appended report  These results have been appended to a previously verified report      Lipase [[id number]]  (Abnormal) Collected: 09/17/22 1651    Lab Status: Final result Specimen: Blood from Arm, Left Updated: 09/17/22 1737     Lipase 72 u/L     hCG, quantitative [[id number]]  (Abnormal) Collected: 09/17/22 1651    Lab Status: Final result Specimen: Blood from Arm, Left Updated: 09/17/22 1737     HCG, Quant 67,581 mIU/mL     Narrative:       Expected Ranges:     Approximate               Approximate HCG  Gestation age          Concentration ( mIU/mL)  _____________          ______________________   Verne Spatz                      HCG values  0 2-1                       5-50  1-2                           2-3                         100-5000  3-4                         500-82375  4-5                         1000-05099  5-6                         [id number]  6-8                         21644-680011  8-12                        [id number]      Comprehensive metabolic panel [[id number]]  (Abnormal) Collected: 09/17/22 1651    Lab Status: Final result Specimen: Blood from Arm, Left Updated: 09/17/22 1714     Sodium 136 mmol/L      Potassium 3 5 mmol/L      Chloride 102 mmol/L      CO2 26 mmol/L      ANION GAP 8 mmol/L      BUN 10 mg/dL      Creatinine 0 60 mg/dL      Glucose 79 mg/dL      Calcium 8 8 mg/dL      AST 13 U/L      ALT 24 U/L      Alkaline Phosphatase 61 U/L      Total Protein 7 5 g/dL      Albumin 3 8 g/dL      Total Bilirubin 0 14 mg/dL      eGFR 125 ml/min/1 73sq m     Narrative:      [place] guidelines for Chronic Kidney Disease (CKD):     Stage 1 with normal or high GFR (GFR > 90 mL/min/1 73 square meters)    Stage 2 Mild CKD (GFR = 60-89 mL/min/1 73 square meters)    Stage 3A Moderate CKD (GFR = 45-59 mL/min/1 73 square meters)    Stage 3B Moderate CKD (GFR = 30-44 mL/min/1 73 square meters)    Stage 4 Severe CKD (GFR = 15-29 mL/min/1 73 square meters)    Stage 5 End Stage CKD (GFR <15 mL/min/1 73 square meters)  Note: GFR calculation is accurate only with a steady state creatinine    Urine Microscopic [[id number]]  (Abnormal) Collected: 09/17/22 1652    Lab Status: Final result Specimen: Urine, Clean Catch Updated: 09/17/22 1713     RBC, UA 1-2 /hpf      WBC, UA 4-10 /hpf      Epithelial Cells Occasional /hpf      Bacteria, UA Occasional /hpf      MUCUS THREADS Occasional     URINE COMMENT --    UA w Reflex to Microscopic w Reflex to Culture [[id number]]  (Abnormal) Collected: 09/17/22 1652    Lab Status: Final result Specimen: Urine, Clean Catch Updated: 09/17/22 1700     Color, UA Yellow     Clarity, UA Clear     Specific Gravity, UA 1 015     pH, UA 6 5     Leukocytes, UA Negative     Nitrite, UA Negative     Protein, UA Negative mg/dl      Glucose, UA Negative mg/dl      Ketones, UA Trace mg/dl      Urobilinogen, UA 1 0 E U /dl      Bilirubin, UA Negative     Occult Blood, UA Moderate     URINE COMMENT --    Urine culture [[id number]] Collected: 09/17/22 1652    Lab Status:  In process Specimen: Urine, Clean Catch Updated: 09/17/22 1700    POCT pregnancy, urine [[id number]]  (Abnormal) Resulted: 09/17/22 1655    Lab Status: Final result Updated: 09/17/22 1655     EXT PREG TEST UR (Ref: Negative) positive     Control valid                 US OB < 14 weeks with transvaginal   Final Result by Iván Jenkins MD (09/17 1939) Single intrauterine gestation at 7 weeks 1 day (range +/- 1 week), however no fetal heart activity identified  VASQUEZ of 5/5/2023  Based on current consensus literature terminology, this is a nonviable intrauterine pregnancy  Reference: N Engl J Med 282;29 pp  2279 to 1451  The study was marked in [facility] for immediate notification  Workstation performed: [id number]                    Procedures  POC Pelvic US    Date/Time: 9/17/2022 6:00 PM  Performed by: Edmond York MD  Authorized by: Edmond York MD     Patient location:  ED  Other Assisting Provider: No    Procedure details:     Exam Type:  Diagnostic    Indications: evaluate for IUP and pregnant with vaginal bleeding      Assessment for: evaluate fetal viability      Technique:  Transabdominal obstetric (HCG+) exam    Views obtained: uterus (transverse and sagittal)      Image quality: limited diagnostic      Image availability:  Images available in PACS, still images obtained and video obtained  Uterine findings:     Single gestation: identified      Gestational sac: identified      Yolk sac: identified      Fetal pole: identified      Fetal heart rate: not identified    Interpretation:     Ultrasound impressions: indeterminate      Pregnancy findings: IUP without fetal heart rate (IUFD)               ED Course                               SBIRT 22yo+    Flowsheet Row Most Recent Value   SBIRT (25 yo +)    In order to provide better care to our patients, we are screening all of our patients for alcohol and drug use  Would it be okay to ask you these screening questions? Yes Filed at: 09/17/2022 1904   Initial Alcohol Screen: US AUDIT-C     1  How often do you have a drink containing alcohol? 0 Filed at: 09/17/2022 1904   2  How many drinks containing alcohol do you have on a typical day you are drinking? 0 Filed at: 09/17/2022 1904   3a  Male UNDER 65: How often do you have five or more drinks on one occasion?  0 Filed at: 09/17/2022 1904 3b  FEMALE Any Age, or MALE 65+: How often do you have 4 or more drinks on one occassion? 0 Filed at: 2022   Audit-C Score 0 Filed at: 2022   GALE: How many times in the past year have you    Used an illegal drug or used a prescription medication for non-medical reasons? Never Filed at: 2022                    MDM  Number of Diagnoses or Management Options  Threatened miscarriage in early pregnancy  Vaginal bleeding affecting early pregnancy  Vaginal bleeding  Diagnosis management comments: 79-year-old female  presenting at approximately 7 weeks gestation with abdominal pain and vaginal bleeding  Concern for miscarriage  Plan for basic labs plus abdominal labs  Type and screen  Bedside ultrasound  Oral pain medication and IV fluids  Reassess  Pain mildly improved after medications  Labs no acute process  Patient known Rh negative and recommended RhoGAM   Patient acknowledged understanding of risks and possible consequences of not receiving RhoGAM but states that she already had a hysterectomy scheduled and does not intend to have additional children and is currently refusing RhoGAM   Bedside ultrasound with fetal tissue and uterus but no visualized heartbeat  Formal ultrasound demonstrating similar  Provided information for D&E at patient request   Gyn referral  Discussed results and recommendations  Advised follow up PCP and gyn  Medication recommendations  Given instructions and return precautions  Patient/family at bedside acknowledged understanding of all written and verbal instructions and return precautions  Discharged          Amount and/or Complexity of Data Reviewed  Clinical lab tests: reviewed and ordered  Tests in the radiology section of CPT®: ordered and reviewed  Tests in the medicine section of CPT®: reviewed and ordered  Review and summarize past medical records: yes  Independent visualization of images, tracings, or specimens: yes    Risk of Complications, Morbidity, and/or Mortality  Presenting problems: high  Diagnostic procedures: high  Management options: high    Patient Progress  Patient progress: stable      Disposition  Final diagnoses:   Threatened miscarriage in early pregnancy   Vaginal bleeding affecting early pregnancy   Vaginal bleeding   Lower abdominal pain     Time reflects when diagnosis was documented in both MDM as applicable and the Disposition within this note     Time User Action Codes Description Comment    9/17/2022  4:43 PM Ronn Carmita Add [O20 0] Threatened miscarriage in early pregnancy     9/17/2022  4:43 PM Ronn Carmita Add [O20 9] Vaginal bleeding affecting early pregnancy     9/17/2022  4:43 PM Ronn Carmita Add [N93 9] Vaginal bleeding     9/18/2022  2:07 AM Ronn Carmita Add [R10 30] Lower abdominal pain       ED Disposition     ED Disposition   Discharge    Condition   Stable    Date/Time   Sat Sep 17, 2022  4:33 PM    Comment   Cheryle Dru discharge to home/self care                 Follow-up Information     Follow up With Specialties Details Why Contact Info Additional Information    Marcela Cordero MD Family Medicine Schedule an appointment as soon as possible for a visit in 1 week  [address] [place] [address] [facility] [phone]  [address] [facility] Obstetrics and Gynecology Schedule an appointment as soon as possible for a visit in 3 days  [address] [facility] [address]  [phone] [address] [facility], [address] [place] [address], [address] [facility]          Discharge Medication List as of 9/17/2022  8:58 PM      CONTINUE these medications which have NOT CHANGED    Details   clonazePAM (KlonoPIN) 1 mg tablet Take 1 mg by mouth daily, Starting Tue 10/23/2018, Historical Med      dicyclomine (BENTYL) 20 mg tablet Take 1 tablet (20 mg total) by mouth every 6 (six) hours, Starting Tue 1/11/2022, Until Thu 2/10/2022, Normal      ibuprofen (MOTRIN) 800 mg tablet Take 800 mg by mouth every 8 (eight) hours as needed, Starting Sat 2/1/2020, Until Sun 1/31/2021, Historical Med      Levonorgest-Eth Estrad 91-Day 0 15-0 03 &0 01 MG TABS Take 1 tablet by mouth, Starting Mon 7/2/2018, Until Tue 7/2/2019, Historical Med      pantoprazole (PROTONIX) 40 mg tablet Take 1 tablet (40 mg total) by mouth daily, Starting Tue 1/11/2022, Until Sat 3/12/2022, Normal                 PDMP Review     None          ED Provider  Electronically Signed by           Rafael Duncan MD  09/18/22 3936

## 2022-09-19 ENCOUNTER — OFFICE VISIT (OUTPATIENT)
Dept: OBGYN CLINIC | Age: 27
End: 2022-09-19
Payer: COMMERCIAL

## 2022-09-19 ENCOUNTER — APPOINTMENT (OUTPATIENT)
Dept: LAB | Facility: CLINIC | Age: 27
End: 2022-09-19
Payer: COMMERCIAL

## 2022-09-19 VITALS
DIASTOLIC BLOOD PRESSURE: 64 MMHG | WEIGHT: 150.6 LBS | HEIGHT: 64 IN | BODY MASS INDEX: 25.71 KG/M2 | SYSTOLIC BLOOD PRESSURE: 112 MMHG

## 2022-09-19 DIAGNOSIS — N94.6 DYSMENORRHEA: Primary | ICD-10-CM

## 2022-09-19 DIAGNOSIS — O02.1 MISSED AB: ICD-10-CM

## 2022-09-19 DIAGNOSIS — N92.0 MENORRHAGIA WITH REGULAR CYCLE: ICD-10-CM

## 2022-09-19 DIAGNOSIS — O20.0 THREATENED MISCARRIAGE IN EARLY PREGNANCY: ICD-10-CM

## 2022-09-19 DIAGNOSIS — O20.9 VAGINAL BLEEDING AFFECTING EARLY PREGNANCY: ICD-10-CM

## 2022-09-19 LAB
BACTERIA UR CULT: ABNORMAL
BACTERIA UR CULT: ABNORMAL

## 2022-09-19 PROCEDURE — 36415 COLL VENOUS BLD VENIPUNCTURE: CPT

## 2022-09-19 PROCEDURE — 86901 BLOOD TYPING SEROLOGIC RH(D): CPT

## 2022-09-19 PROCEDURE — 99204 OFFICE O/P NEW MOD 45 MIN: CPT | Performed by: STUDENT IN AN ORGANIZED HEALTH CARE EDUCATION/TRAINING PROGRAM

## 2022-09-19 PROCEDURE — 86850 RBC ANTIBODY SCREEN: CPT

## 2022-09-19 PROCEDURE — 86900 BLOOD TYPING SEROLOGIC ABO: CPT

## 2022-09-19 RX ORDER — BUTALBITAL, ACETAMINOPHEN, CAFFEINE AND CODEINE PHOSPHATE 50; 325; 40; 30 MG/1; MG/1; MG/1; MG/1
1 CAPSULE ORAL EVERY 4 HOURS PRN
COMMUNITY

## 2022-09-19 RX ORDER — IBUPROFEN 200 MG
TABLET ORAL EVERY 6 HOURS PRN
COMMUNITY

## 2022-09-19 NOTE — PRE-PROCEDURE INSTRUCTIONS
Pre-Surgery Instructions:   Medication Instructions    butalbital-acetaminophen-caffeine-codeine (FIORICET WITH CODEINE) -85-30 MG per capsule Uses PRN- OK to take day of surgery    dicyclomine (BENTYL) 20 mg tablet Uses PRN- DO NOT take day of surgery    ibuprofen (MOTRIN) 200 mg tablet Uses PRN- DO NOT take day of surgery    pantoprazole (PROTONIX) 40 mg tablet Uses PRN- DO NOT take day of surgery   Covid screening negative as per patient  Reviewed [facility]'s masking policy  Patient verbalized understanding  Reviewed showering and medication instructions  Take medications morning of surgery with a small sip of water  Patient verbalized understanding  Advised NPO after [facility] and ASC will call with scheduled surgical time

## 2022-09-19 NOTE — H&P (VIEW-ONLY)
Assessment/Plan:     Problem List Items Addressed This Visit        Genitourinary    Dysmenorrhea - Primary       Other    Menorrhagia with regular cycle      Other Visit Diagnoses     Threatened miscarriage in early pregnancy        Vaginal bleeding affecting early pregnancy            -discussed management of missed  to include expectant management, medical and surgical  Pt prefers D&C   -Desires to move forward with suction D&C  We discussed risks, benefits, alternatives  We discussed risks including uterine perforation, bleeding, infection, damage to surrounding organs, DVT/PE, death  Discussed minimal risks with blood transfusion  Benefits include definitive management  Patient wishes to proceed  -Will plan to proceed with suction D&C    - reviewed management of adenomyosis including CHC, progesterone IUD, surgical intervention  She continues to be interested in surgical management, so we discussed routes of surgery, that she is a good candidate for TVH  She would like to have this performed, will return for a preop  RTO postop/TVH preop    Subjective:      Patient ID: Eva Sanchez is a 31 y o  female  HPI  She presents today for follow up from the [facility] ED, where she was diagnosed with a missed   This was an unplanned pregnancy  She had experienced heavy bleeding a couple of weeks ago, had an ultrasound at an outside facility that indicated a blighted ovum, was told that it would pass on its own  She was then seen at [facility] on 9/15, where TVUS indicated a single, live, IUP, measuring 7 weeks  When she experienced further bleeding this weekend, she presented to [facility], where TVUS indicated a missed   She has previously had an elective  via D&C without anesthesia, so she is very familiar with the procedure  She has been counseled about the options for medical, surgical and expectant management  She strongly desires a suction D&C as soon as possible  Additionally, she notes a long history of heavy and painful menstrual bleeding  She has been counseled regarding management options, has had bad experiences with combined hormonal contraception  She does not feel comfortable with the concept of an IUD  She had a total hysterectomy scheduled for next month, through [facility], but this was cancelled after she became pregnant  The following portions of the patient's history were reviewed and updated as appropriate: allergies, current medications, past family history, past medical history, past social history, past surgical history and problem list     Review of Systems  +vaginal bleeding, no light headedness/dizziness, +dysmenorrhea, +HMB    Objective:  /64 (BP Location: Right arm, Patient Position: Sitting, Cuff Size: Standard)   Ht 5' 4" (1 626 m)   Wt 68 3 kg (150 lb 9 6 oz)   BMI 25 85 kg/m²      Physical Exam  Vitals reviewed  Constitutional:       Appearance: She is well-developed  HENT:      Head: Normocephalic  Cardiovascular:      Rate and Rhythm: Normal rate  Pulmonary:      Effort: Pulmonary effort is normal    Abdominal:      General: There is no distension  Palpations: Abdomen is soft  Tenderness: There is no abdominal tenderness  There is no guarding or rebound  Genitourinary:     General: Normal vulva  Exam position: Lithotomy position  Vagina: No bleeding  Cervix: No cervical motion tenderness  Comments: Small mobile uterus  Musculoskeletal:         General: Normal range of motion  Cervical back: Normal range of motion  Skin:     General: Skin is warm and dry  Neurological:      Mental Status: She is alert and oriented to person, place, and time  Psychiatric:         Behavior: Behavior normal          TVUS: sIUP without HR noted   Adenomyosis    Outside [facility] office notes and imaging reviewed    Overall MDM: moderate   Diagnosis moderate, Data moderate, Risk low

## 2022-09-19 NOTE — PROGRESS NOTES
Assessment/Plan:     Problem List Items Addressed This Visit        Genitourinary    Dysmenorrhea - Primary       Other    Menorrhagia with regular cycle      Other Visit Diagnoses     Threatened miscarriage in early pregnancy        Vaginal bleeding affecting early pregnancy            -discussed management of missed  to include expectant management, medical and surgical  Pt prefers D&C   -Desires to move forward with suction D&C  We discussed risks, benefits, alternatives  We discussed risks including uterine perforation, bleeding, infection, damage to surrounding organs, DVT/PE, death  Discussed minimal risks with blood transfusion  Benefits include definitive management  Patient wishes to proceed  -Will plan to proceed with suction D&C    - reviewed management of adenomyosis including CHC, progesterone IUD, surgical intervention  She continues to be interested in surgical management, so we discussed routes of surgery, that she is a good candidate for TVH  She would like to have this performed, will return for a preop  RTO postop/TVH preop    Subjective:      Patient ID: Lenn Lundborg is a 31 y o  female  HPI  She presents today for follow up from the [facility] ED, where she was diagnosed with a missed   This was an unplanned pregnancy  She had experienced heavy bleeding a couple of weeks ago, had an ultrasound at an outside facility that indicated a blighted ovum, was told that it would pass on its own  She was then seen at [facility] on 9/15, where TVUS indicated a single, live, IUP, measuring 7 weeks  When she experienced further bleeding this weekend, she presented to [facility], where TVUS indicated a missed   She has previously had an elective  via D&C without anesthesia, so she is very familiar with the procedure  She has been counseled about the options for medical, surgical and expectant management  She strongly desires a suction D&C as soon as possible  Additionally, she notes a long history of heavy and painful menstrual bleeding  She has been counseled regarding management options, has had bad experiences with combined hormonal contraception  She does not feel comfortable with the concept of an IUD  She had a total hysterectomy scheduled for next month, through [facility], but this was cancelled after she became pregnant  The following portions of the patient's history were reviewed and updated as appropriate: allergies, current medications, past family history, past medical history, past social history, past surgical history and problem list     Review of Systems  +vaginal bleeding, no light headedness/dizziness, +dysmenorrhea, +HMB    Objective:  /64 (BP Location: Right arm, Patient Position: Sitting, Cuff Size: Standard)   Ht 5' 4" (1 626 m)   Wt 68 3 kg (150 lb 9 6 oz)   BMI 25 85 kg/m²      Physical Exam  Vitals reviewed  Constitutional:       Appearance: She is well-developed  HENT:      Head: Normocephalic  Cardiovascular:      Rate and Rhythm: Normal rate  Pulmonary:      Effort: Pulmonary effort is normal    Abdominal:      General: There is no distension  Palpations: Abdomen is soft  Tenderness: There is no abdominal tenderness  There is no guarding or rebound  Genitourinary:     General: Normal vulva  Exam position: Lithotomy position  Vagina: No bleeding  Cervix: No cervical motion tenderness  Comments: Small mobile uterus  Musculoskeletal:         General: Normal range of motion  Cervical back: Normal range of motion  Skin:     General: Skin is warm and dry  Neurological:      Mental Status: She is alert and oriented to person, place, and time  Psychiatric:         Behavior: Behavior normal          TVUS: sIUP without HR noted   Adenomyosis    Outside [facility] office notes and imaging reviewed    Overall MDM: moderate   Diagnosis moderate, Data moderate, Risk low

## 2022-09-20 ENCOUNTER — ANESTHESIA (OUTPATIENT)
Dept: PERIOP | Facility: HOSPITAL | Age: 27
End: 2022-09-20
Payer: COMMERCIAL

## 2022-09-20 ENCOUNTER — HOSPITAL ENCOUNTER (OUTPATIENT)
Facility: HOSPITAL | Age: 27
Setting detail: OUTPATIENT SURGERY
Discharge: HOME | End: 2022-09-20
Attending: OBSTETRICS & GYNECOLOGY | Admitting: OBSTETRICS & GYNECOLOGY
Payer: COMMERCIAL

## 2022-09-20 ENCOUNTER — ANESTHESIA EVENT (OUTPATIENT)
Dept: PERIOP | Facility: HOSPITAL | Age: 27
End: 2022-09-20
Payer: COMMERCIAL

## 2022-09-20 VITALS
HEART RATE: 63 BPM | OXYGEN SATURATION: 100 % | TEMPERATURE: 97.2 F | HEIGHT: 64 IN | SYSTOLIC BLOOD PRESSURE: 99 MMHG | BODY MASS INDEX: 25.41 KG/M2 | RESPIRATION RATE: 22 BRPM | DIASTOLIC BLOOD PRESSURE: 58 MMHG | WEIGHT: 148.81 LBS

## 2022-09-20 DIAGNOSIS — O02.1 MISSED AB: ICD-10-CM

## 2022-09-20 PROBLEM — F17.200 SMOKING: Status: ACTIVE | Noted: 2022-09-20

## 2022-09-20 PROBLEM — G43.909 MIGRAINE: Status: ACTIVE | Noted: 2017-10-30

## 2022-09-20 LAB
ABO GROUP BLD: NORMAL
BLD GP AB SCN SERPL QL: NEGATIVE
RH BLD: NEGATIVE
SPECIMEN EXPIRATION DATE: NORMAL

## 2022-09-20 PROCEDURE — 88305 TISSUE EXAM BY PATHOLOGIST: CPT | Performed by: STUDENT IN AN ORGANIZED HEALTH CARE EDUCATION/TRAINING PROGRAM

## 2022-09-20 PROCEDURE — 88342 IMHCHEM/IMCYTCHM 1ST ANTB: CPT | Performed by: STUDENT IN AN ORGANIZED HEALTH CARE EDUCATION/TRAINING PROGRAM

## 2022-09-20 PROCEDURE — 59820 CARE OF MISCARRIAGE: CPT | Performed by: OBSTETRICS & GYNECOLOGY

## 2022-09-20 PROCEDURE — 88182 CELL MARKER STUDY: CPT | Performed by: STUDENT IN AN ORGANIZED HEALTH CARE EDUCATION/TRAINING PROGRAM

## 2022-09-20 RX ORDER — FENTANYL CITRATE 50 UG/ML
INJECTION, SOLUTION INTRAMUSCULAR; INTRAVENOUS AS NEEDED
Status: DISCONTINUED | OUTPATIENT
Start: 2022-09-20 | End: 2022-09-20

## 2022-09-20 RX ORDER — ACETAMINOPHEN 325 MG/1
975 TABLET ORAL EVERY 8 HOURS SCHEDULED
Status: CANCELLED | OUTPATIENT
Start: 2022-09-20

## 2022-09-20 RX ORDER — SODIUM CHLORIDE, SODIUM LACTATE, POTASSIUM CHLORIDE, CALCIUM CHLORIDE 600; 310; 30; 20 MG/100ML; MG/100ML; MG/100ML; MG/100ML
125 INJECTION, SOLUTION INTRAVENOUS CONTINUOUS
Status: DISCONTINUED | OUTPATIENT
Start: 2022-09-20 | End: 2022-09-20 | Stop reason: HOSPADM

## 2022-09-20 RX ORDER — MIDAZOLAM HYDROCHLORIDE 2 MG/2ML
INJECTION, SOLUTION INTRAMUSCULAR; INTRAVENOUS AS NEEDED
Status: DISCONTINUED | OUTPATIENT
Start: 2022-09-20 | End: 2022-09-20

## 2022-09-20 RX ORDER — ONDANSETRON 2 MG/ML
INJECTION INTRAMUSCULAR; INTRAVENOUS AS NEEDED
Status: DISCONTINUED | OUTPATIENT
Start: 2022-09-20 | End: 2022-09-20

## 2022-09-20 RX ORDER — PROMETHAZINE HYDROCHLORIDE 25 MG/ML
25 INJECTION, SOLUTION INTRAMUSCULAR; INTRAVENOUS ONCE AS NEEDED
Status: DISCONTINUED | OUTPATIENT
Start: 2022-09-20 | End: 2022-09-20 | Stop reason: HOSPADM

## 2022-09-20 RX ORDER — PROPOFOL 10 MG/ML
INJECTION, EMULSION INTRAVENOUS CONTINUOUS PRN
Status: DISCONTINUED | OUTPATIENT
Start: 2022-09-20 | End: 2022-09-20

## 2022-09-20 RX ORDER — KETOROLAC TROMETHAMINE 30 MG/ML
INJECTION, SOLUTION INTRAMUSCULAR; INTRAVENOUS AS NEEDED
Status: DISCONTINUED | OUTPATIENT
Start: 2022-09-20 | End: 2022-09-20

## 2022-09-20 RX ORDER — DOXYCYCLINE HYCLATE 100 MG/1
200 CAPSULE ORAL ONCE
Status: DISCONTINUED | OUTPATIENT
Start: 2022-09-20 | End: 2022-09-20

## 2022-09-20 RX ORDER — ONDANSETRON 2 MG/ML
4 INJECTION INTRAMUSCULAR; INTRAVENOUS EVERY 6 HOURS PRN
Status: CANCELLED | OUTPATIENT
Start: 2022-09-20

## 2022-09-20 RX ORDER — FENTANYL CITRATE/PF 50 MCG/ML
25 SYRINGE (ML) INJECTION
Status: DISCONTINUED | OUTPATIENT
Start: 2022-09-20 | End: 2022-09-20 | Stop reason: HOSPADM

## 2022-09-20 RX ORDER — SODIUM CHLORIDE 9 MG/ML
125 INJECTION, SOLUTION INTRAVENOUS CONTINUOUS
Status: DISCONTINUED | OUTPATIENT
Start: 2022-09-20 | End: 2022-09-20 | Stop reason: HOSPADM

## 2022-09-20 RX ORDER — LIDOCAINE HYDROCHLORIDE 10 MG/ML
INJECTION, SOLUTION EPIDURAL; INFILTRATION; INTRACAUDAL; PERINEURAL AS NEEDED
Status: DISCONTINUED | OUTPATIENT
Start: 2022-09-20 | End: 2022-09-20

## 2022-09-20 RX ORDER — OXYCODONE HYDROCHLORIDE 5 MG/1
5 TABLET ORAL ONCE AS NEEDED
Status: CANCELLED | OUTPATIENT
Start: 2022-09-20

## 2022-09-20 RX ORDER — ONDANSETRON 4 MG/1
4 TABLET, FILM COATED ORAL EVERY 8 HOURS PRN
COMMUNITY

## 2022-09-20 RX ORDER — PROPOFOL 10 MG/ML
INJECTION, EMULSION INTRAVENOUS AS NEEDED
Status: DISCONTINUED | OUTPATIENT
Start: 2022-09-20 | End: 2022-09-20

## 2022-09-20 RX ORDER — DEXMEDETOMIDINE HYDROCHLORIDE 100 UG/ML
INJECTION, SOLUTION INTRAVENOUS AS NEEDED
Status: DISCONTINUED | OUTPATIENT
Start: 2022-09-20 | End: 2022-09-20

## 2022-09-20 RX ORDER — MAGNESIUM HYDROXIDE 1200 MG/15ML
LIQUID ORAL AS NEEDED
Status: DISCONTINUED | OUTPATIENT
Start: 2022-09-20 | End: 2022-09-20 | Stop reason: HOSPADM

## 2022-09-20 RX ADMIN — SODIUM CHLORIDE, SODIUM LACTATE, POTASSIUM CHLORIDE, AND CALCIUM CHLORIDE: .6; .31; .03; .02 INJECTION, SOLUTION INTRAVENOUS at 13:30

## 2022-09-20 RX ADMIN — MIDAZOLAM HYDROCHLORIDE 2 MG: 1 INJECTION, SOLUTION INTRAMUSCULAR; INTRAVENOUS at 13:50

## 2022-09-20 RX ADMIN — FENTANYL CITRATE 50 MCG: 50 INJECTION, SOLUTION INTRAMUSCULAR; INTRAVENOUS at 13:53

## 2022-09-20 RX ADMIN — LIDOCAINE HYDROCHLORIDE 20 MG: 10 INJECTION, SOLUTION EPIDURAL; INFILTRATION; INTRACAUDAL; PERINEURAL at 13:53

## 2022-09-20 RX ADMIN — DEXMEDETOMIDINE HCL 8 MCG: 100 INJECTION INTRAVENOUS at 14:00

## 2022-09-20 RX ADMIN — FENTANYL CITRATE 25 MCG: 50 INJECTION, SOLUTION INTRAMUSCULAR; INTRAVENOUS at 13:58

## 2022-09-20 RX ADMIN — DEXMEDETOMIDINE HCL 12 MCG: 100 INJECTION INTRAVENOUS at 13:53

## 2022-09-20 RX ADMIN — FENTANYL CITRATE 25 MCG: 50 INJECTION, SOLUTION INTRAMUSCULAR; INTRAVENOUS at 14:02

## 2022-09-20 RX ADMIN — ONDANSETRON 4 MG: 2 INJECTION INTRAMUSCULAR; INTRAVENOUS at 14:00

## 2022-09-20 RX ADMIN — PROPOFOL 100 MCG/KG/MIN: 10 INJECTION, EMULSION INTRAVENOUS at 13:54

## 2022-09-20 RX ADMIN — PROPOFOL 60 MG: 10 INJECTION, EMULSION INTRAVENOUS at 13:54

## 2022-09-20 RX ADMIN — DOXYCYCLINE 100 MG: 100 INJECTION, POWDER, LYOPHILIZED, FOR SOLUTION INTRAVENOUS at 13:39

## 2022-09-20 RX ADMIN — KETOROLAC TROMETHAMINE 30 MG: 30 INJECTION, SOLUTION INTRAMUSCULAR at 14:09

## 2022-09-20 NOTE — INTERVAL H&P NOTE
H&P reviewed  After examining the patient I find no changes in the patients condition since the H&P had been written      Vitals:    09/20/22 1143   BP: 111/65   Pulse: 77   Resp: 18   Temp: (!) 97 3 °F (36 3 °C)   SpO2: 98%

## 2022-09-20 NOTE — ANESTHESIA POSTPROCEDURE EVALUATION
Post-Op Assessment Note    CV Status:  Stable  Pain Score: 1    Pain management: adequate     Mental Status:  Alert and awake   Hydration Status:  Euvolemic   PONV Controlled:  Controlled   Airway Patency:  Patent      Post Op Vitals Reviewed: Yes      Staff: CRNA         No complications documented      BP   96/51   Temp 97 8   Pulse 78   Resp 18   SpO2 95% RA

## 2022-09-20 NOTE — OP NOTE
OPERATIVE REPORT  PATIENT NAME: Christiano Olivares    :  1995  MRN: [id number]  Pt Location: [facility] OR ROOM 04    SURGERY DATE: 2022    Surgeon(s) and Role:     * Guido Christianson MD - Primary     * Emre Orozco MD - Assisting    Preop Diagnosis:  Missed ab [O02 1]    Post-Op Diagnosis Codes:     * Missed ab [O02 1]    Procedure(s) (LRB):  D&C Suction (N/A)    Specimen(s):  ID Type Source Tests Collected by Time Destination   1 : Product of conception Tissue Products of Conception TISSUE EXAM Guido Christianson MD 2022 [id number]        Estimated Blood Loss:   50cc    Drains:  None     Anesthesia Type:   IV SED/ON-Q    Operative Indications:  Missed ab [O02 1]    Operative Findings:  Normal appearing external genitalia, vaginal mucosa, and cervix  On bimanual exam, approximately 7-8 week size anteverted uterus, mobile, with moderate descensus  After procedure, uterus noted to clamp down nicely and maintain uterine tone  Complications:   None    Procedure and Technique:  Patient was brought to the OR, identified, and transitioned to the operating table  IV sedation was established without complication  She was positioned in dorsal lithotomy with legs in Yellofin stirrups  All pressure points were padded  SCDs were placed and turned on  The perineum and vagina were prepped with chlorhexidine prep and allowed to dry  Surgical drapes were applied  Surgical time-out was performed, all were in agreement  The patient received IV doxycyline intra-operatively  A speculum was placed in the patient's vagina and the anterior lip of the cervix was identified and grasped with a single tooth tenaculum  The cervix was serially dilated to accommodate the suction device  A 10 mm curved tip was selected  This was advanced to the fundus and suction was activated  The products of conception were collected in the suction trap  The uterus was felt to clamp down   The suction tip was removed and gentle curettage was performed throughout the cavity with no further products of conception obtained  Gritty texture was appreciated  The uterus was massaged and firm  All instruments were removed from the patient's vagina  There was no bleeding noted from the tenaculum site  The patient tolerated the procedure well  Sponge, instrument and needle counts were correct times 2  The patient was cleansed, awakened from anesthesia and taken to the recovery room in stable condition  Dr Estela Gutierrez was present for the entire procedure       Patient Disposition:  PACU       SIGNATURE: Lico Kelsey MD   DATE: September 20, 2022  TIME: 2:30 PM

## 2022-09-20 NOTE — ANESTHESIA PREPROCEDURE EVALUATION
Procedure:  D&C Suction (N/A Uterus)    Relevant Problems   CARDIO   (+) Migraine      NEURO/PSYCH   (+) Migraine      PULMONARY   (+) Smoking   She presents today for follow up from the [facility] ED, where she was diagnosed with a missed   This was an unplanned pregnancy  She had experienced heavy bleeding a couple of weeks ago, had an ultrasound at an outside facility that indicated a blighted ovum, was told that it would pass on its own  She was then seen at [facility] on 9/15, where TVUS indicated a single, live, IUP, measuring 7 weeks  When she experienced further bleeding this weekend, she presented to [facility], where TVUS indicated a missed   Physical Exam    Airway    Mallampati score: II  TM Distance: >3 FB  Neck ROM: full     Dental   Comment: Denies loose teeth,     Cardiovascular  Cardiovascular exam normal    Pulmonary  Pulmonary exam normal     Other Findings  Portions of exam deferred due to low yield and/or unknown COVID status      Anesthesia Plan  ASA Score- 2     Anesthesia Type- IV sedation with anesthesia with ASA Monitors  Additional Monitors:   Airway Plan:           Plan Factors-Exercise tolerance (METS): >4 METS  Chart reviewed  Existing labs reviewed  Patient summary reviewed  Patient is a current smoker  Induction- intravenous  Postoperative Plan-     Informed Consent- Anesthetic plan and risks discussed with patient  I personally reviewed this patient with the CRNA  Discussed and agreed on the Anesthesia Plan with the CRNA  Judah Hall

## 2022-09-22 ENCOUNTER — OFFICE VISIT (OUTPATIENT)
Dept: URGENT CARE | Facility: CLINIC | Age: 27
End: 2022-09-22
Payer: COMMERCIAL

## 2022-09-22 VITALS
SYSTOLIC BLOOD PRESSURE: 113 MMHG | HEART RATE: 81 BPM | WEIGHT: 150 LBS | DIASTOLIC BLOOD PRESSURE: 68 MMHG | BODY MASS INDEX: 25.61 KG/M2 | TEMPERATURE: 97.8 F | HEIGHT: 64 IN | OXYGEN SATURATION: 97 %

## 2022-09-22 DIAGNOSIS — J06.9 ACUTE URI: Primary | ICD-10-CM

## 2022-09-22 PROCEDURE — 99213 OFFICE O/P EST LOW 20 MIN: CPT | Performed by: PHYSICIAN ASSISTANT

## 2022-09-22 RX ORDER — CLINDAMYCIN HYDROCHLORIDE 150 MG/1
150 CAPSULE ORAL 3 TIMES DAILY
Qty: 21 CAPSULE | Refills: 0 | Status: SHIPPED | OUTPATIENT
Start: 2022-09-22 | End: 2022-09-29

## 2022-09-22 NOTE — PROGRESS NOTES
[facility]        NAME: Derek Funes is a 31 y o  female  : 1995    MRN: [id number]  DATE: 2022  TIME: 9:16 AM    Assessment and Plan   Acute URI [J06 9]  1  Acute URI  clindamycin (CLEOCIN) 150 mg capsule         Patient Instructions       Follow up with PCP in 3-5 days  Proceed to  ER if symptoms worsen  Chief Complaint     Chief Complaint   Patient presents with   Joon Lesch Like Symptoms     Patient states her 11 yr old child in school brought home a cold, and now her and her younger son are sick  Patient has cough and runny nose over a week now  History of Present Illness       Patient is a 30 yo female who presents for evaluation of nasal congestion, cough, runny nose x 1 5 weeks  No relief with OTC meds  Son with similar symptoms  Denies fevers, SOB, reduced PO intake       Review of Systems   Review of Systems   Constitutional: Negative for chills, diaphoresis, fatigue and fever  HENT: Positive for congestion, postnasal drip and rhinorrhea  Negative for ear pain, sinus pressure, sinus pain, sneezing, sore throat and voice change  Eyes: Negative  Respiratory: Positive for cough  Negative for chest tightness, shortness of breath and wheezing  Cardiovascular: Negative for chest pain and palpitations  Gastrointestinal: Negative for constipation, diarrhea, nausea and vomiting  Endocrine: Negative  Musculoskeletal: Negative for back pain, myalgias and neck pain  Skin: Negative for pallor and rash  Allergic/Immunologic: Negative  Neurological: Negative for dizziness, syncope and headaches  Hematological: Negative  Psychiatric/Behavioral: Negative            Current Medications       Current Outpatient Medications:     butalbital-acetaminophen-caffeine-codeine (FIORICET WITH CODEINE) -88-30 MG per capsule, Take 1 capsule by mouth every 4 (four) hours as needed for headaches, Disp: , Rfl:     clindamycin (CLEOCIN) 150 mg capsule, Take 1 capsule (150 mg total) by mouth 3 (three) times a day for 7 days, Disp: 21 capsule, Rfl: 0    dicyclomine (BENTYL) 20 mg tablet, Take 1 tablet (20 mg total) by mouth every 6 (six) hours, Disp: 120 tablet, Rfl: 0    ibuprofen (MOTRIN) 200 mg tablet, Take by mouth every 6 (six) hours as needed for mild pain, Disp: , Rfl:     ondansetron (ZOFRAN) 4 mg tablet, Take 4 mg by mouth every 8 (eight) hours as needed for nausea or vomiting, Disp: , Rfl:     pantoprazole (PROTONIX) 40 mg tablet, Take 1 tablet (40 mg total) by mouth daily, Disp: 30 tablet, Rfl: 1    Current Allergies     Allergies as of 2022 - Reviewed 2022   Allergen Reaction Noted    Imitrex [sumatriptan] Anaphylaxis 10/06/2020    Penicillins Anaphylaxis 10/16/2018    Codeine GI Intolerance 10/16/2018    Doxycycline GI Intolerance 10/16/2018    Zithromax [azithromycin] GI Intolerance 10/16/2018            The following portions of the patient's history were reviewed and updated as appropriate: allergies, current medications, past family history, past medical history, past social history, past surgical history and problem list      Past Medical History:   Diagnosis Date    Anxiety     Asthma     cold weather, exercise induced    GERD (gastroesophageal reflux disease)     Irritable bowel syndrome     Migraines        Past Surgical History:   Procedure Laterality Date    CHROMOSOME ANALYSIS, PRODUCTS OF CONCEPTION (HISTORICAL)      DILATION AND CURETTAGE OF UTERUS      d&E,     DE COLONOSCOPY FLX DX W/COLLJ SPEC WHEN PFRMD N/A 10/31/2018    Procedure: EGD AND COLONOSCOPY;  Surgeon: Seth Joy MD;  Location: [facility];   Service: Gastroenterology    DE DILATION/CURETTAGE,DIAGNOSTIC N/A 2022    Procedure: D&C Suction;  Surgeon: Kait Wyatt MD;  Location: [facility];  Service: Gynecology    SOFT TISSUE MASS EXCISION      TONSILECTOMY, ADENOIDECTOMY, BILATERAL MYRINGOTOMY AND TUBES      WISDOM TOOTH EXTRACTION         Family History   Problem Relation Age of Onset    Diabetes Mother     Hypertension Father     Heart disease Father     Stroke Father          Medications have been verified  Objective   /68   Pulse 81   Temp 97 8 °F (36 6 °C)   Ht 5' 4" (1 626 m)   Wt 68 kg (150 lb)   SpO2 97%   BMI 25 75 kg/m²        Physical Exam     Physical Exam  Constitutional:       General: She is not in acute distress  Appearance: Normal appearance  She is not ill-appearing or diaphoretic  HENT:      Right Ear: Tympanic membrane, ear canal and external ear normal       Left Ear: Tympanic membrane, ear canal and external ear normal       Nose: Congestion and rhinorrhea present  Mouth/Throat:      Mouth: Mucous membranes are moist       Pharynx: Oropharynx is clear  No posterior oropharyngeal erythema  Eyes:      Conjunctiva/sclera: Conjunctivae normal    Cardiovascular:      Rate and Rhythm: Normal rate and regular rhythm  Heart sounds: Normal heart sounds  Pulmonary:      Effort: Pulmonary effort is normal       Breath sounds: Normal breath sounds  No wheezing, rhonchi or rales  Skin:     General: Skin is warm and dry  Neurological:      Mental Status: She is alert     Psychiatric:         Mood and Affect: Mood normal          Behavior: Behavior normal

## 2022-10-06 PROCEDURE — 88305 TISSUE EXAM BY PATHOLOGIST: CPT | Performed by: STUDENT IN AN ORGANIZED HEALTH CARE EDUCATION/TRAINING PROGRAM

## 2022-10-06 PROCEDURE — 88342 IMHCHEM/IMCYTCHM 1ST ANTB: CPT | Performed by: STUDENT IN AN ORGANIZED HEALTH CARE EDUCATION/TRAINING PROGRAM

## 2022-10-10 ENCOUNTER — OFFICE VISIT (OUTPATIENT)
Dept: OBGYN CLINIC | Age: 27
End: 2022-10-10

## 2022-10-10 VITALS
BODY MASS INDEX: 26.02 KG/M2 | DIASTOLIC BLOOD PRESSURE: 70 MMHG | WEIGHT: 152.4 LBS | SYSTOLIC BLOOD PRESSURE: 112 MMHG | HEIGHT: 64 IN

## 2022-10-10 DIAGNOSIS — N92.0 MENORRHAGIA WITH REGULAR CYCLE: Primary | ICD-10-CM

## 2022-10-10 DIAGNOSIS — F17.200 SMOKING: ICD-10-CM

## 2022-10-10 DIAGNOSIS — N94.6 DYSMENORRHEA: ICD-10-CM

## 2022-10-10 PROBLEM — O02.1 MISSED AB: Status: RESOLVED | Noted: 2022-09-20 | Resolved: 2022-10-10

## 2022-10-10 RX ORDER — MEDROXYPROGESTERONE ACETATE 150 MG/ML
150 INJECTION, SUSPENSION INTRAMUSCULAR
Qty: 1 ML | Refills: 0 | Status: SHIPPED | OUTPATIENT
Start: 2022-10-10

## 2022-10-10 NOTE — PROGRESS NOTES
Assessment/Plan:       Problem List Items Addressed This Visit        Genitourinary    Dysmenorrhea       Other    Menorrhagia with regular cycle - Primary    Relevant Medications    medroxyPROGESTERone (DEPO-PROVERA) 150 mg/mL injection    Smoking        - heavy and painful menstrual cycles most likely associated with adenomyosis  Reviewed inability to diagnosis this without pathology, just working diagnosis at this time  Again reviewed treatment options for adenomyosis to include [address], POP and surgical intervention  She has previously been extensively counseled by myself and surgeons at [facility], wishes to avoid OCPs due to bad prior experience  Depo previously with intolerable bleeding profile  Also does not feel comfortable with LARC  - Desires definitive management with total hysterectomy: we discussed risks, benefits, alternatives  We discussed risks including bleeding, infection, damage to surrounding organs (bowel, bladder), nerves and vessels, DVT/PE, very unlikely risk of death  Discussed minimal risks with blood transfusion to include transfusion reaction, TRALI, rare transmission of HIV/Hepatitis C  Discussed bilateral salpingectomy and benefit of decreased risk of ovarian cancer  Discussed increased risk of procedure secondary to smoking status, recommend cessation through procedure  Discussed plan to leave ovaries in situ  Benefits include removal cervical dysplasia, decreased risk ovarian cancer  We discussed that her exam leads me to believe that I can perform this procedure vaginally, but that there is always the possibility to convert to a laparoscopic approach, should I feel the need to visualize the pelvis/peritoneum directly/laparoscopically  Discussed that this would also lead me to perform a cystoscopy, as ureteral injury is the most common complication of laparoscopic hysterectomy, less likely with vaginal hysterectomy  Patient wishes to proceed     -Erick requests bilateral oophorectomy performed at the time of procedure  We discussed the increased risk cardiovascular disease, dementia, with surgical menopause  We discussed the option for HRT after this  I encouraged her to consider unilateral oophorectomy, removing only the ovary with the most frequent cyst formation at this time  She agrees to proceed in this manner    -Will plan to proceed with TVH/BS/right oophorectomy  Consent form reviewed in detail with patient, signed by patient  Pre and postop instructions reviewed  Preop wash and carbohydrate drink declined by patient today, will return closer to surgery  RTO postop    Subjective:      Patient ID: Judi Mckinnon is a 31 y o  female  HPI  She presents today for preop appointment for hysterectomy  Vanessa Dubois has a long history of heavy and painful menstrual cycles, as well as ovulation  She was seen previously at [facility], where she was consented for RA-TLH/BSO for presumed adenomyosis and painful menstruation  The procedure was cancelled due to and unexpected pregnancy  She unfortunately suffered a miscarriage, now wishes to resume planning for surgery  She has previously utilized OCPs, depo provera for management, neither of which was effective  She does not feel comfortable with an implant in her body  Previously was consented for BSO, due to painful ovulation and desire to have all reproductive organs removed in order to prevent further cycling and discomfort       The following portions of the patient's history were reviewed and updated as appropriate: allergies, current medications, past family history, past medical history, past social history, past surgical history and problem list     Review of Systems  +HMB, +dyspareunia, +dysmenorrhea, +weakness, +fatigue - light headedness, - dizziness    Objective:  /70 (BP Location: Right arm, Patient Position: Sitting, Cuff Size: Standard)   Ht 5' 4" (1 626 m)   Wt 69 1 kg (152 lb 6 4 oz)   LMP 08/01/2022 (Exact Date) Breastfeeding Unknown   BMI 26 16 kg/m²      Physical Exam  Vitals reviewed  Constitutional:       Appearance: She is well-developed  HENT:      Head: Normocephalic  Cardiovascular:      Rate and Rhythm: Normal rate and regular rhythm  Pulmonary:      Effort: Pulmonary effort is normal    Abdominal:      Palpations: Abdomen is soft  Musculoskeletal:         General: Normal range of motion  Cervical back: Normal range of motion  Skin:     General: Skin is warm and dry  Neurological:      Mental Status: She is alert and oriented to person, place, and time     Psychiatric:         Behavior: Behavior normal          TVUS 9/2022: performed for pregnancy, but uterus with extremely heterogeneous and cystic myometrium, consistent with adenomyosis     Entire [facility] course reviewed in detail, consent forms and office notes    Overall MDM: complex  Diagnosis moderate, Data complex, Risk complex (major surgery with risk factors)

## 2022-10-13 ENCOUNTER — CLINICAL SUPPORT (OUTPATIENT)
Dept: OBGYN CLINIC | Age: 27
End: 2022-10-13

## 2022-10-13 VITALS
HEIGHT: 64 IN | DIASTOLIC BLOOD PRESSURE: 84 MMHG | SYSTOLIC BLOOD PRESSURE: 122 MMHG | WEIGHT: 151 LBS | BODY MASS INDEX: 25.78 KG/M2

## 2022-10-13 DIAGNOSIS — N92.0 MENORRHAGIA WITH REGULAR CYCLE: Primary | ICD-10-CM

## 2022-10-13 DIAGNOSIS — Z32.02 NEGATIVE PREGNANCY TEST: ICD-10-CM

## 2022-10-13 DIAGNOSIS — Z30.42 ENCOUNTER FOR DEPO-PROVERA CONTRACEPTION: ICD-10-CM

## 2022-10-13 PROBLEM — N80.03 ADENOMYOSIS: Status: ACTIVE | Noted: 2021-05-10

## 2022-10-13 PROBLEM — R42 VERTIGO: Status: ACTIVE | Noted: 2022-08-29

## 2022-10-13 LAB — SL AMB POCT URINE HCG: NORMAL

## 2022-10-13 RX ORDER — MEDROXYPROGESTERONE ACETATE 150 MG/ML
150 INJECTION, SUSPENSION INTRAMUSCULAR ONCE
Status: COMPLETED | OUTPATIENT
Start: 2022-10-13 | End: 2022-10-13

## 2022-10-13 RX ADMIN — MEDROXYPROGESTERONE ACETATE 150 MG: 150 INJECTION, SUSPENSION INTRAMUSCULAR at 11:25

## 2022-10-13 NOTE — PATIENT INSTRUCTIONS
Medroxyprogesterone (By injection)   Medroxyprogesterone ([id number]-qqhn-hc-proe-MONSE-ter-one)  Prevents pregnancy  Also treats endometriosis and is used with other medicines to help relieve symptoms of cancer, including uterine or kidney cancer  Brand Name(s): Depo-Provera, Depo-Provera Contraceptive, Depo-SubQ Provera 104, medroxyPROGESTERone acetate Novaplus   There may be other brand names for this medicine  When This Medicine Should Not Be Used: This medicine is not right for everyone  You should not receive it if you had an allergic reaction to medroxyprogesterone or if you have a history of breast cancer or blood clots (including heart attack or stroke)  In most cases, you should not use this medicine while you are pregnant  How to Use This Medicine:   Injectable  A nurse or other health provider will give you this medicine  This medicine is given as a shot into a muscle (usually in the buttocks or upper arm) or just under the skin  Your exact treatment schedule depends on the reason you are using this medicine  You doctor will explain your personal schedule  For treatment of cancer symptoms, you may start with a shot once per week  You may need fewer shots as your treatment goes forward  For birth control or endometriosis, you will need a shot every 3 months (13 weeks)  You might need to have the first shot during the first 5 days of your normal menstrual period, to make sure you are not pregnant  If you have just had a baby, you may receive a shot 5 days after birth if you are not breastfeeding or 6 weeks after birth if you are breastfeeding  Read and follow the patient instructions that come with this medicine  Talk to your doctor or pharmacist if you have any questions  Missed dose: You must receive a shot every 3 months if you want to prevent pregnancy  Talk to your doctor or pharmacist if you do not receive your medicine on time, because you may need another form of birth control    Drugs and Foods to Avoid:   Ask your doctor or pharmacist before using any other medicine, including over-the-counter medicines, vitamins, and herbal products  Some medicines can affect how medroxyprogesterone works  Tell your doctor if you are using any of the following:  Aminoglutethimide, bosentan, carbamazepine, felbamate, griseofulvin, mitotane, modafinil, nefazodone, oxcarbazepine, phenobarbital, phenytoin, rifabutin, rifampin, rifapentine, Lupe's wort, topiramate  Medicine to treat an infection (including clarithromycin, itraconazole, ketoconazole, telithromycin, voriconazole)  Medicine to treat HIV/AIDS (including atazanavir, efavirenz, indinavir, nelfinavir, ritonavir, saquinavir)  Warnings While Using This Medicine:   Tell your doctor right away if you think you have become pregnant  Tell your doctor if you are breastfeeding, or if you have kidney disease, liver disease, asthma, diabetes, heart disease, seizures, migraine headaches, an eating disorder, osteoporosis, or a history of depression  Tell your doctor if you smoke  This medicine may cause the following problems:  Weak or thin bones, especially with long-term use  Blood clots, which could lead to stroke, heart attack, eye or vision problems, or other serious problems  Possible increased risk of breast cancer  Injection site reactions  Liver problems  Changes in menstrual periods  Fluid retention (edema) and weight gain  You should not use this medicine for long-term birth control unless you cannot use any other form of birth control  This medicine will not protect you from HIV/AIDS or other sexually transmitted diseases  Tell any doctor or dentist who treats you that you are using this medicine  This medicine may affect certain medical test results  Your doctor will do lab tests at regular visits to check on the effects of this medicine  Keep all appointments    Possible Side Effects While Using This Medicine:   Call your doctor right away if you notice any of these side effects: Allergic reaction: Itching or hives, swelling in your face or hands, swelling or tingling in your mouth or throat, chest tightness, trouble breathing  Chest pain, trouble breathing, or coughing up blood  Dark urine or pale stools, nausea, vomiting, loss of appetite, stomach pain, yellow skin or eyes  Heavy or nonstop vaginal bleeding  Loss of vision, double vision  Numbness or weakness on one side of your body, sudden or severe headache, problems with vision, speech, or walking  Rapid weight gain, swelling in your hands, ankles, or feet  Seizures  If you notice these less serious side effects, talk with your doctor:   Light or missed monthly periods, spotting between periods  Nervousness or dizziness  Pain, redness, burning, swelling, or a lump under your skin where the shot was given  If you notice other side effects that you think are caused by this medicine, tell your doctor  Call your doctor for medical advice about side effects  You may report side effects to FDA at 9-558-FDA-7170    © Copyright [employer] 2022 Information is for End User's use only and may not be sold, redistributed or otherwise used for commercial purposes  The above information is an  only  It is not intended as medical advice for individual conditions or treatments  Talk to your doctor, nurse or pharmacist before following any medical regimen to see if it is safe and effective for you

## 2022-10-13 NOTE — PROGRESS NOTES
Pt is here for Depo Provera injection  Her last dose was   Her annual exam was on last year with YAMAP 05/17/22  Urine pregnancy test done: Yes   Result: Neg  Depo given in [facility]  Tolerated well  Lot [id number] Exp 08/31/2026  Next dose due not needed pt is getting Hysterectomy   Refill needed none    Pt had intercourse two days ago she used protection  Missed ab with suction D&C on 9/19 by Dr Shanice Henley  Plans on Hysterectomy with EC on 11/9/2022

## 2022-10-20 ENCOUNTER — LAB REQUISITION (OUTPATIENT)
Dept: LAB | Facility: HOSPITAL | Age: 27
End: 2022-10-20
Payer: COMMERCIAL

## 2022-10-20 ENCOUNTER — APPOINTMENT (OUTPATIENT)
Dept: LAB | Facility: CLINIC | Age: 27
End: 2022-10-20
Payer: COMMERCIAL

## 2022-10-20 ENCOUNTER — TELEPHONE (OUTPATIENT)
Dept: OBGYN CLINIC | Facility: CLINIC | Age: 27
End: 2022-10-20

## 2022-10-20 DIAGNOSIS — Z01.818 ENCOUNTER FOR OTHER PREPROCEDURAL EXAMINATION: ICD-10-CM

## 2022-10-20 DIAGNOSIS — Z01.818 PRE-OP TESTING: ICD-10-CM

## 2022-10-20 LAB
ABO GROUP BLD: NORMAL
ANION GAP SERPL CALCULATED.3IONS-SCNC: 7 MMOL/L (ref 4–13)
BLD GP AB SCN SERPL QL: NEGATIVE
BUN SERPL-MCNC: 11 MG/DL (ref 5–25)
CALCIUM SERPL-MCNC: 9.1 MG/DL (ref 8.3–10.1)
CHLORIDE SERPL-SCNC: 110 MMOL/L (ref 96–108)
CO2 SERPL-SCNC: 23 MMOL/L (ref 21–32)
CREAT SERPL-MCNC: 0.75 MG/DL (ref 0.6–1.3)
ERYTHROCYTE [DISTWIDTH] IN BLOOD BY AUTOMATED COUNT: 12.1 % (ref 11.6–15.1)
GFR SERPL CREATININE-BSD FRML MDRD: 108 ML/MIN/1.73SQ M
GLUCOSE P FAST SERPL-MCNC: 88 MG/DL (ref 65–99)
HCT VFR BLD AUTO: 43.1 % (ref 34.8–46.1)
HGB BLD-MCNC: 14 G/DL (ref 11.5–15.4)
MCH RBC QN AUTO: 31.1 PG (ref 26.8–34.3)
MCHC RBC AUTO-ENTMCNC: 32.5 G/DL (ref 31.4–37.4)
MCV RBC AUTO: 96 FL (ref 82–98)
PLATELET # BLD AUTO: 291 THOUSANDS/UL (ref 149–390)
PMV BLD AUTO: 11 FL (ref 8.9–12.7)
POTASSIUM SERPL-SCNC: 4.2 MMOL/L (ref 3.5–5.3)
RBC # BLD AUTO: 4.5 MILLION/UL (ref 3.81–5.12)
RH BLD: NEGATIVE
SODIUM SERPL-SCNC: 140 MMOL/L (ref 135–147)
SPECIMEN EXPIRATION DATE: NORMAL
WBC # BLD AUTO: 6.44 THOUSAND/UL (ref 4.31–10.16)

## 2022-10-20 PROCEDURE — 86900 BLOOD TYPING SEROLOGIC ABO: CPT | Performed by: STUDENT IN AN ORGANIZED HEALTH CARE EDUCATION/TRAINING PROGRAM

## 2022-10-20 PROCEDURE — 86850 RBC ANTIBODY SCREEN: CPT | Performed by: STUDENT IN AN ORGANIZED HEALTH CARE EDUCATION/TRAINING PROGRAM

## 2022-10-20 PROCEDURE — 86901 BLOOD TYPING SEROLOGIC RH(D): CPT | Performed by: STUDENT IN AN ORGANIZED HEALTH CARE EDUCATION/TRAINING PROGRAM

## 2022-10-20 PROCEDURE — 87086 URINE CULTURE/COLONY COUNT: CPT

## 2022-10-20 PROCEDURE — 85027 COMPLETE CBC AUTOMATED: CPT

## 2022-10-20 PROCEDURE — 80048 BASIC METABOLIC PNL TOTAL CA: CPT

## 2022-10-20 NOTE — TELEPHONE ENCOUNTER
----- Message from Gertrude Goodwin MD sent at 10/19/2022  4:37 PM EDT -----  Regarding: FW: Plan B  Please notify EC out  Looks like she got depo on 10/11 so it was likely effective at the time of recent intercourse  If she wants peace of mind she can get plan B over the counter but the chance of pregnancy is extremely low   ----- Message -----  From: Kaitlin Street RN  Sent: 10/18/2022   9:13 AM EDT  To: Roxane Nina MD  Subject: FW: Plan B                                         ----- Message -----  From: Nikki Reich  Sent: 10/18/2022   8:33 AM EDT  To: [facility]  Subject: Plan B                                           Good Morning Dr Portia Abreu  I sorta panicking  I'm not sure how quickly depo becomes effective  My  and I had intercourse two days ago  I normally would not panic but I still haven't had a period since August 1st so I have no way of tracking my period and ovulation  Would it be possible to get the plan B sent to the pharmacy? Or do you think I'm fine with the Depo? Thank you so much

## 2022-10-21 LAB — BACTERIA UR CULT: NORMAL

## 2022-10-27 ENCOUNTER — TELEPHONE (OUTPATIENT)
Dept: OBGYN CLINIC | Facility: CLINIC | Age: 27
End: 2022-10-27

## 2022-10-27 NOTE — TELEPHONE ENCOUNTER
Spoke to pt and relayed msg from [facility] -no labs would be valid due to being on depo

## 2022-10-27 NOTE — TELEPHONE ENCOUNTER
Patient is having concerns about her hysterectomy procedure that is coming up  Patient is experiencing, extreme night sweats, not sleeping, hot flashes, losing a lot of hair, and may want to check hormone levels  She asked about the urine culture number [phone] and if someone can explained that to her       Please call patient

## 2022-10-27 NOTE — TELEPHONE ENCOUNTER
spoke to pt and what she is concerned about is the sx- excited about the surgery-    Main c/o is the hot flashes etc  No period since 8/1- on depo  She just thinks her hormones are out of whack and would like labs       She thinks its poss she is having menopause- did relay msg from [facility] not likely at her age /

## 2022-11-02 RX ORDER — UREA 10 %
1 LOTION (ML) TOPICAL
COMMUNITY

## 2022-11-02 RX ORDER — ALBUTEROL SULFATE 90 UG/1
2 AEROSOL, METERED RESPIRATORY (INHALATION) EVERY 6 HOURS PRN
COMMUNITY

## 2022-11-02 NOTE — PRE-PROCEDURE INSTRUCTIONS
Pre-Surgery Instructions:   Medication Instructions   • albuterol (PROVENTIL HFA,VENTOLIN HFA) 90 mcg/act inhaler Uses PRN- OK to take day of surgery   • butalbital-acetaminophen-caffeine-codeine (FIORICET WITH CODEINE) -62-30 MG per capsule Uses PRN- OK to take day of surgery   • dicyclomine (BENTYL) 20 mg tablet Uses PRN- OK to take day of surgery   • ibuprofen (MOTRIN) 200 mg tablet Stop taking 7 days prior to surgery  • melatonin 1 mg Stop taking 7 days prior to surgery  • pantoprazole (PROTONIX) 40 mg tablet Uses PRN- OK to take day of surgery     Spoke with pt via phone  Advised hospital location will call with arrival time night before surgery  NPO after midnight the night before surgery, including chewing gum and hard candy  A small sip of water is allowed to take approved medications the morning of surgery  Non-vaccinated patients and visitors need to remain masked at all times while in the hospital     Instructed to leave contacts/jewelery/valuables at home  Ok to wear dentures, glasses and hearing aides into the hospital - they will be removed for surgery  No smoking or alcohol consumption 24 hours prior to surgery  Avoid all Aspirin products and OTC Vit/Supp 1 week prior to surgery and avoid NSAIDs 3 days prior to surgery per anesthesia instructions  Tylenol ok to take prn  Showering instructions reviewed for night before and morning of surgery using surgical soap or antibacterial soap  No carb drinks given - office contacted  Patient verbalized understanding of all of the above, including medication instructions

## 2022-11-09 ENCOUNTER — ANESTHESIA (OUTPATIENT)
Dept: PERIOP | Facility: HOSPITAL | Age: 27
End: 2022-11-09

## 2022-11-09 ENCOUNTER — ANESTHESIA EVENT (OUTPATIENT)
Dept: PERIOP | Facility: HOSPITAL | Age: 27
End: 2022-11-09

## 2022-11-09 ENCOUNTER — HOSPITAL ENCOUNTER (OUTPATIENT)
Facility: HOSPITAL | Age: 27
Setting detail: OUTPATIENT SURGERY
Discharge: HOME | End: 2022-11-09
Attending: STUDENT IN AN ORGANIZED HEALTH CARE EDUCATION/TRAINING PROGRAM | Admitting: STUDENT IN AN ORGANIZED HEALTH CARE EDUCATION/TRAINING PROGRAM

## 2022-11-09 VITALS
DIASTOLIC BLOOD PRESSURE: 82 MMHG | BODY MASS INDEX: 25.78 KG/M2 | RESPIRATION RATE: 16 BRPM | SYSTOLIC BLOOD PRESSURE: 133 MMHG | HEART RATE: 96 BPM | TEMPERATURE: 97.2 F | HEIGHT: 64 IN | OXYGEN SATURATION: 99 % | WEIGHT: 151 LBS

## 2022-11-09 DIAGNOSIS — Z90.710 S/P HYSTERECTOMY: Primary | ICD-10-CM

## 2022-11-09 DIAGNOSIS — N92.0 MENORRHAGIA WITH REGULAR CYCLE: ICD-10-CM

## 2022-11-09 DIAGNOSIS — N94.6 DYSMENORRHEA: ICD-10-CM

## 2022-11-09 DIAGNOSIS — R10.2 PELVIC PAIN IN FEMALE: ICD-10-CM

## 2022-11-09 LAB
EXT PREGNANCY TEST URINE: NEGATIVE
EXT. CONTROL: NORMAL

## 2022-11-09 RX ORDER — FENTANYL CITRATE 50 UG/ML
INJECTION, SOLUTION INTRAMUSCULAR; INTRAVENOUS AS NEEDED
Status: DISCONTINUED | OUTPATIENT
Start: 2022-11-09 | End: 2022-11-09

## 2022-11-09 RX ORDER — PROMETHAZINE HYDROCHLORIDE 25 MG/ML
12.5 INJECTION, SOLUTION INTRAMUSCULAR; INTRAVENOUS ONCE AS NEEDED
Status: COMPLETED | OUTPATIENT
Start: 2022-11-09 | End: 2022-11-09

## 2022-11-09 RX ORDER — HYDROMORPHONE HCL IN WATER/PF 6 MG/30 ML
0.2 PATIENT CONTROLLED ANALGESIA SYRINGE INTRAVENOUS
Status: DISCONTINUED | OUTPATIENT
Start: 2022-11-09 | End: 2022-11-09 | Stop reason: HOSPADM

## 2022-11-09 RX ORDER — SCOLOPAMINE TRANSDERMAL SYSTEM 1 MG/1
1 PATCH, EXTENDED RELEASE TRANSDERMAL
Status: DISCONTINUED | OUTPATIENT
Start: 2022-11-09 | End: 2022-11-09 | Stop reason: HOSPADM

## 2022-11-09 RX ORDER — PROPOFOL 10 MG/ML
INJECTION, EMULSION INTRAVENOUS CONTINUOUS PRN
Status: DISCONTINUED | OUTPATIENT
Start: 2022-11-09 | End: 2022-11-09

## 2022-11-09 RX ORDER — ONDANSETRON 2 MG/ML
4 INJECTION INTRAMUSCULAR; INTRAVENOUS EVERY 6 HOURS PRN
Status: CANCELLED | OUTPATIENT
Start: 2022-11-09

## 2022-11-09 RX ORDER — PROPOFOL 10 MG/ML
INJECTION, EMULSION INTRAVENOUS AS NEEDED
Status: DISCONTINUED | OUTPATIENT
Start: 2022-11-09 | End: 2022-11-09

## 2022-11-09 RX ORDER — SODIUM CHLORIDE, SODIUM LACTATE, POTASSIUM CHLORIDE, CALCIUM CHLORIDE 600; 310; 30; 20 MG/100ML; MG/100ML; MG/100ML; MG/100ML
INJECTION, SOLUTION INTRAVENOUS CONTINUOUS PRN
Status: DISCONTINUED | OUTPATIENT
Start: 2022-11-09 | End: 2022-11-09

## 2022-11-09 RX ORDER — MIDAZOLAM HYDROCHLORIDE 2 MG/2ML
INJECTION, SOLUTION INTRAMUSCULAR; INTRAVENOUS AS NEEDED
Status: DISCONTINUED | OUTPATIENT
Start: 2022-11-09 | End: 2022-11-09

## 2022-11-09 RX ORDER — ONDANSETRON 2 MG/ML
INJECTION INTRAMUSCULAR; INTRAVENOUS AS NEEDED
Status: DISCONTINUED | OUTPATIENT
Start: 2022-11-09 | End: 2022-11-09

## 2022-11-09 RX ORDER — SUCCINYLCHOLINE/SOD CL,ISO/PF 100 MG/5ML
SYRINGE (ML) INTRAVENOUS AS NEEDED
Status: DISCONTINUED | OUTPATIENT
Start: 2022-11-09 | End: 2022-11-09

## 2022-11-09 RX ORDER — FENTANYL CITRATE/PF 50 MCG/ML
25 SYRINGE (ML) INJECTION
Status: DISCONTINUED | OUTPATIENT
Start: 2022-11-09 | End: 2022-11-09 | Stop reason: HOSPADM

## 2022-11-09 RX ORDER — IBUPROFEN 600 MG/1
600 TABLET ORAL EVERY 6 HOURS PRN
Qty: 30 TABLET | Refills: 0 | Status: SHIPPED | OUTPATIENT
Start: 2022-11-09

## 2022-11-09 RX ORDER — KETOROLAC TROMETHAMINE 30 MG/ML
INJECTION, SOLUTION INTRAMUSCULAR; INTRAVENOUS AS NEEDED
Status: DISCONTINUED | OUTPATIENT
Start: 2022-11-09 | End: 2022-11-09

## 2022-11-09 RX ORDER — LIDOCAINE HYDROCHLORIDE 10 MG/ML
INJECTION, SOLUTION EPIDURAL; INFILTRATION; INTRACAUDAL; PERINEURAL AS NEEDED
Status: DISCONTINUED | OUTPATIENT
Start: 2022-11-09 | End: 2022-11-09

## 2022-11-09 RX ORDER — ONDANSETRON 2 MG/ML
4 INJECTION INTRAMUSCULAR; INTRAVENOUS ONCE AS NEEDED
Status: COMPLETED | OUTPATIENT
Start: 2022-11-09 | End: 2022-11-09

## 2022-11-09 RX ORDER — VASOPRESSIN 20 U/ML
INJECTION PARENTERAL AS NEEDED
Status: DISCONTINUED | OUTPATIENT
Start: 2022-11-09 | End: 2022-11-09 | Stop reason: HOSPADM

## 2022-11-09 RX ORDER — DEXMEDETOMIDINE HYDROCHLORIDE 100 UG/ML
INJECTION, SOLUTION INTRAVENOUS AS NEEDED
Status: DISCONTINUED | OUTPATIENT
Start: 2022-11-09 | End: 2022-11-09

## 2022-11-09 RX ORDER — GABAPENTIN 300 MG/1
300 CAPSULE ORAL ONCE
Status: COMPLETED | OUTPATIENT
Start: 2022-11-09 | End: 2022-11-09

## 2022-11-09 RX ORDER — DEXAMETHASONE SODIUM PHOSPHATE 10 MG/ML
INJECTION, SOLUTION INTRAMUSCULAR; INTRAVENOUS AS NEEDED
Status: DISCONTINUED | OUTPATIENT
Start: 2022-11-09 | End: 2022-11-09

## 2022-11-09 RX ORDER — ACETAMINOPHEN 325 MG/1
975 TABLET ORAL EVERY 6 HOURS PRN
Status: DISCONTINUED | OUTPATIENT
Start: 2022-11-09 | End: 2022-11-09 | Stop reason: HOSPADM

## 2022-11-09 RX ORDER — ACETAMINOPHEN 325 MG/1
1000 TABLET ORAL ONCE
Status: COMPLETED | OUTPATIENT
Start: 2022-11-09 | End: 2022-11-09

## 2022-11-09 RX ORDER — SENNOSIDES 8.6 MG
650 CAPSULE ORAL EVERY 8 HOURS PRN
Qty: 30 TABLET | Refills: 0 | Status: SHIPPED | OUTPATIENT
Start: 2022-11-09

## 2022-11-09 RX ORDER — CLINDAMYCIN PHOSPHATE 900 MG/50ML
900 INJECTION INTRAVENOUS ONCE
Status: COMPLETED | OUTPATIENT
Start: 2022-11-09 | End: 2022-11-09

## 2022-11-09 RX ORDER — CELECOXIB 200 MG/1
200 CAPSULE ORAL ONCE
Status: COMPLETED | OUTPATIENT
Start: 2022-11-09 | End: 2022-11-09

## 2022-11-09 RX ORDER — HYDROMORPHONE HCL/PF 1 MG/ML
SYRINGE (ML) INJECTION AS NEEDED
Status: DISCONTINUED | OUTPATIENT
Start: 2022-11-09 | End: 2022-11-09

## 2022-11-09 RX ADMIN — KETOROLAC TROMETHAMINE 15 MG: 30 INJECTION, SOLUTION INTRAMUSCULAR at 14:49

## 2022-11-09 RX ADMIN — ONDANSETRON 4 MG: 2 INJECTION INTRAMUSCULAR; INTRAVENOUS at 13:12

## 2022-11-09 RX ADMIN — CELECOXIB 200 MG: 200 CAPSULE ORAL at 12:20

## 2022-11-09 RX ADMIN — FENTANYL CITRATE 100 MCG: 50 INJECTION INTRAMUSCULAR; INTRAVENOUS at 13:14

## 2022-11-09 RX ADMIN — SCOPOLAMINE 1 PATCH: 1 SYSTEM TRANSDERMAL at 13:01

## 2022-11-09 RX ADMIN — ONDANSETRON 4 MG: 2 INJECTION INTRAMUSCULAR; INTRAVENOUS at 15:27

## 2022-11-09 RX ADMIN — GENTAMICIN SULFATE 270 MG: 40 INJECTION, SOLUTION INTRAMUSCULAR; INTRAVENOUS at 13:23

## 2022-11-09 RX ADMIN — DEXAMETHASONE SODIUM PHOSPHATE 10 MG: 10 INJECTION, SOLUTION INTRAMUSCULAR; INTRAVENOUS at 13:15

## 2022-11-09 RX ADMIN — Medication 25 MCG: at 16:28

## 2022-11-09 RX ADMIN — GABAPENTIN 300 MG: 300 CAPSULE ORAL at 12:20

## 2022-11-09 RX ADMIN — Medication 100 MG: at 13:14

## 2022-11-09 RX ADMIN — HYDROMORPHONE HYDROCHLORIDE 0.5 MG: 1 INJECTION, SOLUTION INTRAMUSCULAR; INTRAVENOUS; SUBCUTANEOUS at 13:44

## 2022-11-09 RX ADMIN — ACETAMINOPHEN 975 MG: 325 TABLET ORAL at 12:21

## 2022-11-09 RX ADMIN — PROPOFOL 200 MG: 10 INJECTION, EMULSION INTRAVENOUS at 13:14

## 2022-11-09 RX ADMIN — PROPOFOL 120 MCG/KG/MIN: 10 INJECTION, EMULSION INTRAVENOUS at 13:17

## 2022-11-09 RX ADMIN — MIDAZOLAM 2 MG: 1 INJECTION INTRAMUSCULAR; INTRAVENOUS at 13:07

## 2022-11-09 RX ADMIN — SODIUM CHLORIDE, SODIUM LACTATE, POTASSIUM CHLORIDE, AND CALCIUM CHLORIDE: .6; .31; .03; .02 INJECTION, SOLUTION INTRAVENOUS at 12:53

## 2022-11-09 RX ADMIN — PROMETHAZINE HYDROCHLORIDE 12.5 MG: 25 INJECTION INTRAMUSCULAR; INTRAVENOUS at 16:05

## 2022-11-09 RX ADMIN — LIDOCAINE HYDROCHLORIDE 50 MG: 10 INJECTION, SOLUTION EPIDURAL; INFILTRATION; INTRACAUDAL; PERINEURAL at 13:14

## 2022-11-09 RX ADMIN — Medication 25 MCG: at 16:21

## 2022-11-09 RX ADMIN — CLINDAMYCIN PHOSPHATE 900 MG: 900 INJECTION, SOLUTION INTRAVENOUS at 13:41

## 2022-11-09 RX ADMIN — DEXMEDETOMIDINE HCL 8 MCG: 100 INJECTION INTRAVENOUS at 14:26

## 2022-11-09 NOTE — H&P
H&P Exam - Gynecology   Christofer Zhang 28 y o  female MRN: [id number]  Unit/Bed#: OR [place] Encounter: [id number]    Assessment/Plan   29 yo with AUB, for hysterectomy for definitive management     History of Present Illness     HPI:  Christofer Zhang is a 28 y o  female who presented with "a long history of heavy and painful menstrual cycles, as well as ovulation  She was seen previously at [facility], where she was consented for RA-TLH/BSO for presumed adenomyosis and painful menstruation  The procedure was cancelled due to and unexpected pregnancy  She unfortunately suffered a miscarriage, now wishes to resume planning for surgery       She has previously utilized OCPs, depo provera for management, neither of which was effective  She does not feel comfortable with an implant in her body  Previously was consented for BSO, due to painful ovulation and desire to have all reproductive organs removed in order to prevent further cycling and discomfort " At this time, she has agreed to unilateral oophorectomy (right), to decrease pain with ovulation, but defer menopause at this young age       Review of Systems as above    Historical Information   Past Medical History:   Diagnosis Date   • Anxiety    • Asthma     cold weather, exercise induced   • GERD (gastroesophageal reflux disease)    • Irritable bowel syndrome    • Migraines      Past Surgical History:   Procedure Laterality Date   • CHROMOSOME ANALYSIS, PRODUCTS OF CONCEPTION (HISTORICAL)     • DILATION AND CURETTAGE OF UTERUS      d&E,    • NE COLONOSCOPY FLX DX W/COLLJ SPEC WHEN PFRMD N/A 10/31/2018    Procedure: EGD AND COLONOSCOPY;  Surgeon: Kamilla Granados MD;  Location: [facility];   Service: Gastroenterology   • NE DILATION/CURETTAGE,DIAGNOSTIC N/A 2022    Procedure: D&C Suction;  Surgeon: Gypsy Booker MD;  Location: [facility];  Service: Gynecology   • SOFT TISSUE MASS EXCISION     • TONSILECTOMY, ADENOIDECTOMY, BILATERAL MYRINGOTOMY AND TUBES     • WISDOM TOOTH EXTRACTION       OB/GYN History:   Family History   Problem Relation Age of Onset   • Diabetes Mother    • Hypertension Father    • Heart disease Father    • Stroke Father      Social History   Social History     Substance and Sexual Activity   Alcohol Use No    Comment: rarely     Social History     Substance and Sexual Activity   Drug Use No     Social History     Tobacco Use   Smoking Status Former Smoker   • Packs/day: 0 25   • Types: Cigarettes   • Quit date: 2022   • Years since quittin 1   Smokeless Tobacco Never Used     E-Cigarette/Vaping   • E-Cigarette Use Former User      E-Cigarette/Vaping Substances   • Nicotine No    • THC No    • CBD No    • Flavoring No    • Other No    • Unknown No        Meds/Allergies   all current active meds have been reviewed  Allergies   Allergen Reactions   • Imitrex [Sumatriptan] Anaphylaxis   • Penicillins Anaphylaxis   • Codeine GI Intolerance   • Doxycycline GI Intolerance   • Zithromax [Azithromycin] GI Intolerance       Objective   Vitals: Blood pressure 132/73, pulse 91, temperature 97 8 °F (36 6 °C), resp  rate 18, height 5' 4" (1 626 m), weight 68 5 kg (151 lb), SpO2 99 %, not currently breastfeeding  No intake or output data in the 24 hours ending 22 1242    Invasive Devices: Invasive Devices  Report    None                 Physical Exam  "Constitutional:       Appearance: She is well-developed  HENT:      Head: Normocephalic  Cardiovascular:      Rate and Rhythm: Normal rate and regular rhythm  Pulmonary:      Effort: Pulmonary effort is normal    Abdominal:      Palpations: Abdomen is soft  Musculoskeletal:         General: Normal range of motion  Cervical back: Normal range of motion  Skin:     General: Skin is warm and dry  Neurological:      Mental Status: She is alert and oriented to person, place, and time     Psychiatric:         Behavior: Behavior normal "

## 2022-11-09 NOTE — ANESTHESIA PREPROCEDURE EVALUATION
Procedure:  TOTAL VAGINAL HYSTERECTOMY BILATERAL SALPINGECTOMY RIGHT OOPHORECTOMY (Bilateral Vagina )    Relevant Problems   CARDIO   (+) Migraine      NEURO/PSYCH   (+) Migraine      PULMONARY   (+) Smoking        Physical Exam    Airway    Mallampati score: II  TM Distance: >3 FB  Neck ROM: full     Dental       Cardiovascular  Rhythm: regular, Rate: normal,     Pulmonary  Breath sounds clear to auscultation,     Other Findings  Intercisor Distance > 3cm          Anesthesia Plan  ASA Score- 2     Anesthesia Type- general with ASA Monitors  Additional Monitors:   Airway Plan: ETT  Comment: Discussed benefits/risks of general anesthesia including possibility of mouth/throat pain, injury to lips/teeth, nausea/vomiting, and surgical pain along with more rare complications such as stroke, MI, pneumonia, aspiration, and injury to blood vessels  Patient understands and wishes to proceed  All questions answered          Plan Factors-Exercise tolerance (METS): >4 METS  Chart reviewed  EKG reviewed  Existing labs reviewed  Induction- intravenous  Postoperative Plan- Plan for postoperative opioid use  Planned trial extubation    Informed Consent- Anesthetic plan and risks discussed with patient  I personally reviewed this patient with the CRNA  Discussed and agreed on the Anesthesia Plan with the CRNA  Yasir Sánchez

## 2022-11-09 NOTE — OP NOTE
OPERATIVE REPORT  PATIENT NAME: Kwan Cazares    :  1995  MRN: [id number]  Pt Location:  OR ROOM 06    SURGERY DATE: 2022    Surgeon(s) and Role:     * Leigh Ann Robles MD - Primary     * Nina Neely MD - Assisting    Preop Diagnosis:  Menorrhagia with regular cycle [N92 0]  Pelvic pain in female [R10 2]  Dysmenorrhea [N94 6]    Post-Op Diagnosis Codes:     * Menorrhagia with regular cycle [N92 0]     * Pelvic pain in female [R10 2]     * Dysmenorrhea [N94 6]    Procedure(s) (LRB):  TOTAL VAGINAL HYSTERECTOMY BILATERAL SALPINGECTOMY RIGHT OOPHORECTOMY (Bilateral)    Specimen(s):  ID Type Source Tests Collected by Time Destination   1 : uterus, cervix, bilateral fallopian tubes and right ovary  Tissue Uterus TISSUE EXAM Mila Pinzon MD 2022 [id number]        Estimated Blood Loss:   150 cc      Anesthesia Type:   General    Operative Indications:  Menorrhagia with regular cycle [N92 0]  Pelvic pain in female [R10 2]  Dysmenorrhea [N94 6]    Operative Findings:  1  Normal appearing external genitalia  2  Small, mid-position uterus  3  Enlarged right ovary    Complications:   None    Procedure and Technique:    The patient was taken to the operating room where she was placed under general endotracheal anesthesia without difficulty  She was placed in dorsal lithotomy position in [address] [place] [address] and prepped and draped in the normal sterile fashion  Bladder was straight cathed for clear yellow urine  A weighted speculum was placed in the vagina, and the cervix was grasped with single-tooth tenaculum  The cervix was circumferentially injected with vasopressin  The cervix was circumferentially incised with Metzenbaum scissors at the cervicovaginal junction  Posterior colpotomy was made without difficulty by entering sharply with Metzenbaum scissors, and the posterior peritoneum was tagged with 0 Vicryl suture  Bloody peritoneal fluid was noted   Weighted speculum was removed, and a long-billed speculum was placed into the cul-de-sac  Attention was then turned to the anterior peritoneum  Dissection was carried sharply in the vesicovaginal space until the peritoneal reflection was identified, picked up and entered sharply  Curved [place] retractor was placed under examining finger into the vesicouterine space  The uterosacral ligaments were clamped with Rolanda clamps, cut and suture ligated  The cardinal ligaments followed in similar fashion  The uterine arteries and the broad ligament were serially clamped with Rolanda clamps, transected and suture ligated on each side using 0-Vicryl  Finally, the ovarian ligament and fallopian tubes were clamped, cut and doubly ligated  The uterus was delivered through the vagina  Additional sutures of 0 Vicryl were placed on the uterine artery pedicles to enhance hemostasis  The pedicles were inspected and noted to be hemostatic  On the right side, the ovary and fallopian tube were grasped with a [place] clamp, and the infundibulopelvic ligament was double ligated with 0-Vicryl suture  The right ovary and fallopian tube was removed using Metzenbaum scissors  The left Fallopian tube was then grasped with a [place], clamped, cut, and suture ligated using 0 Vicryl suture  The pedicles were inspected and noted to be hemostatic  The vaginal cuff angles were closed using figure of eight sutures of 0 Vicryl  Incorporating the posterior peritoneum, the remainder of the cuff was reapproximated in a running, non-locked fashion with 0-Vicryl  The cuff was then imbricated in a running non locked fashion using 0 Vicryl  All instruments were removed from the vagina  The vagina was irrigated with normal saline  The vaginal cuff was hemostatic  The patient tolerated the procedure well  Sponge, lap, needle and instrument counts were correct times two at the end of the procedure  The patient was awoken from anesthesia and taken to recovery in stable condition          Patient Disposition:  PACU         SIGNATURE: Maxx Bryson MD  DATE: November 9, 2022  TIME: 3:02 PM

## 2022-11-09 NOTE — ANESTHESIA POSTPROCEDURE EVALUATION
Post-Op Assessment Note    CV Status:  Stable  Pain Score: 0    Pain management: adequate     Mental Status:  Sleepy   Hydration Status:  Stable   PONV Controlled:  None   Airway Patency:  Patent      Post Op Vitals Reviewed: Yes      Staff: CRNA         No complications documented  BP   102/69   Temp   97 1   Pulse  83   Resp   26   SpO2   99% on 6LFM   Postop VS in PACU noted above, SV non-obstructed with oral airway in place

## 2022-11-12 ENCOUNTER — HOSPITAL ENCOUNTER (EMERGENCY)
Facility: HOSPITAL | Age: 27
Discharge: HOME | End: 2022-11-12
Attending: EMERGENCY MEDICINE

## 2022-11-12 ENCOUNTER — NURSE TRIAGE (OUTPATIENT)
Dept: OTHER | Facility: OTHER | Age: 27
End: 2022-11-12

## 2022-11-12 ENCOUNTER — APPOINTMENT (EMERGENCY)
Dept: CT IMAGING | Facility: HOSPITAL | Age: 27
End: 2022-11-12

## 2022-11-12 VITALS
RESPIRATION RATE: 19 BRPM | HEART RATE: 89 BPM | SYSTOLIC BLOOD PRESSURE: 107 MMHG | TEMPERATURE: 97.1 F | DIASTOLIC BLOOD PRESSURE: 72 MMHG | OXYGEN SATURATION: 98 %

## 2022-11-12 DIAGNOSIS — N39.0 UTI (URINARY TRACT INFECTION): Primary | ICD-10-CM

## 2022-11-12 LAB
ALBUMIN SERPL BCP-MCNC: 3.6 G/DL (ref 3.5–5)
ALP SERPL-CCNC: 68 U/L (ref 46–116)
ALT SERPL W P-5'-P-CCNC: 17 U/L (ref 12–78)
ANION GAP SERPL CALCULATED.3IONS-SCNC: 10 MMOL/L (ref 4–13)
AST SERPL W P-5'-P-CCNC: 8 U/L (ref 5–45)
BACTERIA UR QL AUTO: ABNORMAL /HPF
BASOPHILS # BLD AUTO: 0.04 THOUSANDS/ÂΜL (ref 0–0.1)
BASOPHILS NFR BLD AUTO: 0 % (ref 0–1)
BILIRUB SERPL-MCNC: 0.16 MG/DL (ref 0.2–1)
BILIRUB UR QL STRIP: NEGATIVE
BUN SERPL-MCNC: 11 MG/DL (ref 5–25)
CALCIUM SERPL-MCNC: 8.6 MG/DL (ref 8.3–10.1)
CHLORIDE SERPL-SCNC: 108 MMOL/L (ref 96–108)
CLARITY UR: ABNORMAL
CO2 SERPL-SCNC: 25 MMOL/L (ref 21–32)
COLOR UR: YELLOW
CREAT SERPL-MCNC: 0.73 MG/DL (ref 0.6–1.3)
EOSINOPHIL # BLD AUTO: 0.26 THOUSAND/ÂΜL (ref 0–0.61)
EOSINOPHIL NFR BLD AUTO: 2 % (ref 0–6)
ERYTHROCYTE [DISTWIDTH] IN BLOOD BY AUTOMATED COUNT: 11.2 % (ref 11.6–15.1)
GFR SERPL CREATININE-BSD FRML MDRD: 112 ML/MIN/1.73SQ M
GLUCOSE SERPL-MCNC: 81 MG/DL (ref 65–140)
GLUCOSE UR STRIP-MCNC: NEGATIVE MG/DL
HCT VFR BLD AUTO: 40 % (ref 34.8–46.1)
HGB BLD-MCNC: 14 G/DL (ref 11.5–15.4)
HGB UR QL STRIP.AUTO: ABNORMAL
IMM GRANULOCYTES # BLD AUTO: 0.03 THOUSAND/UL (ref 0–0.2)
IMM GRANULOCYTES NFR BLD AUTO: 0 % (ref 0–2)
KETONES UR STRIP-MCNC: NEGATIVE MG/DL
LEUKOCYTE ESTERASE UR QL STRIP: ABNORMAL
LIPASE SERPL-CCNC: 143 U/L (ref 73–393)
LYMPHOCYTES # BLD AUTO: 2.71 THOUSANDS/ÂΜL (ref 0.6–4.47)
LYMPHOCYTES NFR BLD AUTO: 25 % (ref 14–44)
MCH RBC QN AUTO: 31.7 PG (ref 26.8–34.3)
MCHC RBC AUTO-ENTMCNC: 35 G/DL (ref 31.4–37.4)
MCV RBC AUTO: 91 FL (ref 82–98)
MONOCYTES # BLD AUTO: 0.67 THOUSAND/ÂΜL (ref 0.17–1.22)
MONOCYTES NFR BLD AUTO: 6 % (ref 4–12)
MUCOUS THREADS UR QL AUTO: ABNORMAL
NEUTROPHILS # BLD AUTO: 6.97 THOUSANDS/ÂΜL (ref 1.85–7.62)
NEUTS SEG NFR BLD AUTO: 67 % (ref 43–75)
NITRITE UR QL STRIP: NEGATIVE
NON-SQ EPI CELLS URNS QL MICRO: ABNORMAL /HPF
NRBC BLD AUTO-RTO: 0 /100 WBCS
PH UR STRIP.AUTO: 7 [PH]
PLATELET # BLD AUTO: 283 THOUSANDS/UL (ref 149–390)
PMV BLD AUTO: 10.1 FL (ref 8.9–12.7)
POTASSIUM SERPL-SCNC: 3.6 MMOL/L (ref 3.5–5.3)
PROT SERPL-MCNC: 7.3 G/DL (ref 6.4–8.4)
PROT UR STRIP-MCNC: ABNORMAL MG/DL
RBC # BLD AUTO: 4.42 MILLION/UL (ref 3.81–5.12)
RBC #/AREA URNS AUTO: ABNORMAL /HPF
SODIUM SERPL-SCNC: 143 MMOL/L (ref 135–147)
SP GR UR STRIP.AUTO: 1.02 (ref 1–1.03)
UROBILINOGEN UR STRIP-ACNC: 2 MG/DL
WBC # BLD AUTO: 10.68 THOUSAND/UL (ref 4.31–10.16)
WBC #/AREA URNS AUTO: ABNORMAL /HPF

## 2022-11-12 RX ORDER — CEPHALEXIN 500 MG/1
500 CAPSULE ORAL EVERY 12 HOURS SCHEDULED
Qty: 14 CAPSULE | Refills: 0 | Status: SHIPPED | OUTPATIENT
Start: 2022-11-12 | End: 2022-11-19

## 2022-11-12 RX ORDER — CEPHALEXIN 250 MG/1
500 CAPSULE ORAL ONCE
Status: COMPLETED | OUTPATIENT
Start: 2022-11-12 | End: 2022-11-12

## 2022-11-12 RX ADMIN — CEPHALEXIN 500 MG: 250 CAPSULE ORAL at 20:30

## 2022-11-12 RX ADMIN — IOHEXOL 100 ML: 350 INJECTION, SOLUTION INTRAVENOUS at 18:45

## 2022-11-12 NOTE — ED NOTES
Patient bladder scanned for post void residual for 88mL     Maria Guadalupe Silverman, RN  11/12/22 1800

## 2022-11-12 NOTE — ED PROVIDER NOTES
History  Chief Complaint   Patient presents with   • Post-op Problem     Patient co bladder and back pain that started today  Patient states "I feel like cant empty my bladder and the back pain is unbearable " Patient reports having hysterectomy on the   Today suprapubic pain    Urinated this morning  Hysterectomy on 21    Did not urinate prior to discharge  Urinated on  1030p    No falls or injury    Radiates to lumbar; hurts the abdomen to cough  Wipe there is BRB after urination  Initial pain with urination after hysterectomy    Pain radiates to back when standing or walking              Prior to Admission Medications   Prescriptions Last Dose Informant Patient Reported?  Taking?   acetaminophen (TYLENOL) 650 mg CR tablet   No No   Sig: Take 1 tablet (650 mg total) by mouth every 8 (eight) hours as needed for mild pain   albuterol (PROVENTIL HFA,VENTOLIN HFA) 90 mcg/act inhaler   Yes No   Sig: Inhale 2 puffs every 6 (six) hours as needed for wheezing   butalbital-acetaminophen-caffeine-codeine (FIORICET WITH CODEINE) -19-30 MG per capsule   Yes No   Sig: Take 1 capsule by mouth every 4 (four) hours as needed for headaches   ibuprofen (MOTRIN) 600 mg tablet   No No   Sig: Take 1 tablet (600 mg total) by mouth every 6 (six) hours as needed for mild pain   melatonin 1 mg   Yes No   Sig: Take 1 mg by mouth daily at bedtime   pantoprazole (PROTONIX) 40 mg tablet   No No   Sig: Take 1 tablet (40 mg total) by mouth daily      Facility-Administered Medications: None       Past Medical History:   Diagnosis Date   • Anxiety    • Asthma     cold weather, exercise induced   • GERD (gastroesophageal reflux disease)    • Irritable bowel syndrome    • Migraines        Past Surgical History:   Procedure Laterality Date   • CHROMOSOME ANALYSIS, PRODUCTS OF CONCEPTION (HISTORICAL)     • DILATION AND CURETTAGE OF UTERUS      d&E,    • AK COLONOSCOPY FLX DX W/COLLJ SPEC WHEN PFRMD N/A 10/31/2018 Procedure: EGD AND COLONOSCOPY;  Surgeon: Jeremías Monet MD;  Location: [facility]; Service: Gastroenterology   • NM DILATION/CURETTAGE,DIAGNOSTIC N/A 2022    Procedure: D&C Suction;  Surgeon: Michelle Dominguez MD;  Location: [facility];  Service: Gynecology   • NM VAG HYST,RMV TUBE/OVARY Bilateral 2022    Procedure: TOTAL VAGINAL HYSTERECTOMY BILATERAL SALPINGECTOMY RIGHT OOPHORECTOMY;  Surgeon: Leigh Ann Robles MD;  Location:  MAIN OR;  Service: Gynecology   • SOFT TISSUE MASS EXCISION     • TONSILECTOMY, ADENOIDECTOMY, BILATERAL MYRINGOTOMY AND TUBES     • WISDOM TOOTH EXTRACTION         Family History   Problem Relation Age of Onset   • Diabetes Mother    • Hypertension Father    • Heart disease Father    • Stroke Father      I have reviewed and agree with the history as documented      E-Cigarette/Vaping   • E-Cigarette Use Former User      E-Cigarette/Vaping Substances   • Nicotine No    • THC No    • CBD No    • Flavoring No    • Other No    • Unknown No      Social History     Tobacco Use   • Smoking status: Former Smoker     Packs/day: 0 25     Types: Cigarettes     Quit date: 2022     Years since quittin 1   • Smokeless tobacco: Never Used   Vaping Use   • Vaping Use: Former   Substance Use Topics   • Alcohol use: No     Comment: rarely   • Drug use: No       Review of Systems    Physical Exam  Physical Exam    Vital Signs  ED Triage Vitals [22]   Temperature Pulse Respirations Blood Pressure SpO2   (!) 97 1 °F (36 2 °C) (!) 106 18 125/76 97 %      Temp Source Heart Rate Source Patient Position - Orthostatic VS BP Location FiO2 (%)   Tympanic Monitor Sitting Left arm --      Pain Score       --           Vitals:    22   BP: 125/76   Pulse: (!) 106   Patient Position - Orthostatic VS: Sitting         Visual Acuity      ED Medications  Medications - No data to display    Diagnostic Studies  Results Reviewed     None                 No orders to display Procedures  Procedures         ED Course                                             MDM    Disposition  Final diagnoses:   None     ED Disposition     None      Follow-up Information    None         Patient's Medications   Discharge Prescriptions    No medications on file       No discharge procedures on file      PDMP Review     None          ED Provider  Electronically Signed by normal       Nose: Nose normal       Mouth/Throat:      Mouth: Mucous membranes are moist    Eyes:      General: No scleral icterus  Right eye: No discharge  Left eye: No discharge  Conjunctiva/sclera: Conjunctivae normal    Cardiovascular:      Rate and Rhythm: Normal rate and regular rhythm  Heart sounds: No murmur heard  Pulmonary:      Effort: Pulmonary effort is normal  No respiratory distress  Breath sounds: Normal breath sounds  Abdominal:      General: Bowel sounds are normal       Palpations: Abdomen is soft  Tenderness: There is abdominal tenderness in the suprapubic area  Genitourinary:     Rectum: Normal  Guaiac result negative  No tenderness  Normal anal tone  Musculoskeletal:         General: No swelling, deformity or signs of injury  Normal range of motion  Cervical back: Normal, normal range of motion and neck supple  No rigidity  Thoracic back: Normal       Lumbar back: Normal    Skin:     General: Skin is warm and dry  Capillary Refill: Capillary refill takes less than 2 seconds  Coloration: Skin is not jaundiced  Findings: No erythema or rash  Neurological:      General: No focal deficit present  Mental Status: She is alert and oriented to person, place, and time  Mental status is at baseline  Cranial Nerves: No cranial nerve deficit  Gait: Gait normal    Psychiatric:         Mood and Affect: Mood normal          Behavior: Behavior normal          Thought Content:  Thought content normal          Judgment: Judgment normal          Vital Signs  ED Triage Vitals [11/12/22 1712]   Temperature Pulse Respirations Blood Pressure SpO2   (!) 97 1 °F (36 2 °C) (!) 106 18 125/76 97 %      Temp Source Heart Rate Source Patient Position - Orthostatic VS BP Location FiO2 (%)   Tympanic Monitor Sitting Left arm --      Pain Score       --           Vitals:    11/12/22 1712 11/12/22 1815 11/12/22 1930 11/12/22 2000   BP: 125/76 112/73 111/71 107/72   Pulse: (!) 106 87 88 89   Patient Position - Orthostatic VS: Sitting Lying Lying Sitting         Visual Acuity      ED Medications  Medications   iohexol (OMNIPAQUE) 350 MG/ML injection (SINGLE-DOSE) 100 mL (100 mL Intravenous Given 11/12/22 1845)   cephalexin (KEFLEX) capsule 500 mg (500 mg Oral Given 11/12/22 2030)       Diagnostic Studies  Results Reviewed     Procedure Component Value Units Date/Time    Urine culture [[id number]]  (Abnormal)  (Susceptibility) Collected: 11/12/22 1955    Lab Status: Final result Specimen: Urine, Clean Catch Updated: 11/16/22 1502     Urine Culture 10,000-19,000 cfu/ml Enterococcus faecalis      70,000-79,000 cfu/ml Lactobacillus species      <10,000 cfu/ml Beta Hemolytic Streptococcus Group B    Susceptibility     Enterococcus faecalis (1)     Antibiotic Interpretation Microscan   Method Status    ZID Performed  Yes  KIET Final    Ampicillin ($$) Susceptible <=2 00 ug/ml KIET Final    Levofloxacin ($) Susceptible 1 00 ug/ml KIET Final    Nitrofurantoin Susceptible <=32 ug/ml KIET Final    Tetracycline Resistant >8 ug/ml KIET Final    Vancomycin ($) Susceptible 1 00 ug/ml KIET Final                   Urine Microscopic [[id number]]  (Abnormal) Collected: 11/12/22 1955    Lab Status: Final result Specimen: Urine, Clean Catch Updated: 11/12/22 2021     RBC, UA Innumerable /hpf      WBC, UA 10-20 /hpf      Epithelial Cells Moderate /hpf      Bacteria, UA None Seen /hpf      MUCUS THREADS Occasional    UA w Reflex to Microscopic w Reflex to Culture [[id number]]  (Abnormal) Collected: 11/12/22 1955    Lab Status: Final result Specimen: Urine, Clean Catch Updated: 11/12/22 2015     Color, UA Yellow     Clarity, UA Turbid     Specific Gravity, UA 1 022     pH, UA 7 0     Leukocytes, UA Moderate     Nitrite, UA Negative     Protein, UA 30 (1+) mg/dl      Glucose, UA Negative mg/dl      Ketones, UA Negative mg/dl      Urobilinogen, UA 2 0 mg/dl      Bilirubin, UA Negative Occult Blood, UA Large    Comprehensive metabolic panel [[id number]]  (Abnormal) Collected: 11/12/22 1806    Lab Status: Final result Specimen: Blood from Arm, Right Updated: 11/12/22 1831     Sodium 143 mmol/L      Potassium 3 6 mmol/L      Chloride 108 mmol/L      CO2 25 mmol/L      ANION GAP 10 mmol/L      BUN 11 mg/dL      Creatinine 0 73 mg/dL      Glucose 81 mg/dL      Calcium 8 6 mg/dL      AST 8 U/L      ALT 17 U/L      Alkaline Phosphatase 68 U/L      Total Protein 7 3 g/dL      Albumin 3 6 g/dL      Total Bilirubin 0 16 mg/dL      eGFR 112 ml/min/1 73sq m     Narrative:      [place] guidelines for Chronic Kidney Disease (CKD):   •  Stage 1 with normal or high GFR (GFR > 90 mL/min/1 73 square meters)  •  Stage 2 Mild CKD (GFR = 60-89 mL/min/1 73 square meters)  •  Stage 3A Moderate CKD (GFR = 45-59 mL/min/1 73 square meters)  •  Stage 3B Moderate CKD (GFR = 30-44 mL/min/1 73 square meters)  •  Stage 4 Severe CKD (GFR = 15-29 mL/min/1 73 square meters)  •  Stage 5 End Stage CKD (GFR <15 mL/min/1 73 square meters)  Note: GFR calculation is accurate only with a steady state creatinine    Lipase [[id number]]  (Normal) Collected: 11/12/22 1806    Lab Status: Final result Specimen: Blood from Arm, Right Updated: 11/12/22 1831     Lipase 143 u/L     CBC and differential [[id number]]  (Abnormal) Collected: 11/12/22 1806    Lab Status: Final result Specimen: Blood from Arm, Right Updated: 11/12/22 1819     WBC 10 68 Thousand/uL      RBC 4 42 Million/uL      Hemoglobin 14 0 g/dL      Hematocrit 40 0 %      MCV 91 fL      MCH 31 7 pg      MCHC 35 0 g/dL      RDW 11 2 %      MPV 10 1 fL      Platelets 042 Thousands/uL      nRBC 0 /100 WBCs      Neutrophils Relative 67 %      Immat GRANS % 0 %      Lymphocytes Relative 25 %      Monocytes Relative 6 %      Eosinophils Relative 2 %      Basophils Relative 0 %      Neutrophils Absolute 6 97 Thousands/µL      Immature Grans Absolute 0 03 Thousand/uL Lymphocytes Absolute 2 71 Thousands/µL      Monocytes Absolute 0 67 Thousand/µL      Eosinophils Absolute 0 26 Thousand/µL      Basophils Absolute 0 04 Thousands/µL                  CT abdomen pelvis with contrast   Final Result by Yoav Kumar MD (11/12 1932)      Cholelithiasis  Correlate with urinalysis to evaluate for urinary tract infection  Workstation performed: [id number]         CT recon only lumbar spine   Final Result by Yoav Kumar MD (11/12 1945)      No fracture or traumatic subluxation  Workstation performed: [id number]                    Procedures  Procedures         ED Course                       MDM  Number of Diagnoses or Management Options  UTI (urinary tract infection): new and requires workup  Diagnosis management comments: This is a 65-year-old female presenting to the emergency department for evaluation of suprapubic pain and decreased urination  Reports having a hysterectomy on 11/09/2022  Reports she is been having decreased urination since surgery  Reports she did have a Meneses catheter placed during surgery  Reports initially having pain with urination that is subsided  Reports occasionally bright red blood when wiping after urination  Reports the pain occasionally wraps around to her lumbar spine  Reports the suprapubic pain is worse when coughing or straining  Reports he is not had a bowel movement in 1 week, reports that is normal for her since she is been a child  Differential diagnosis to include but is not limited to:  UTI, postop urinary retention, mass, nephrolithiasis, pyelonephritis, constipation  -low suspicion for cauda equina, patient is not tender in the lumbar spine on palpation  Patient has good rectal tone  Patient does not have any saddle anesthesia      Initial ED Plan:  CBC, CMP, bladder scan, postvoid residual bladder scan, UA, lipase, CT abdomen pelvis with contrast, CT recon lumbar spine    ED results:  Cholelithiasis, elevated WBC on UA will treat for UTI  -no fracture or traumatic subluxation    Final ED assessment: Patient is stable and well appearing  Discussed radiologic studies and laboratory results  Discussed follow-up with PCP and OBGYN for postsurgical follow-up  Discussed continuing antibiotic as directed  Discussed Tylenol/Motrin as needed for pain  Strict return precautions were discussed including but not limited to continued urinary retention/decreased urination, numbness, tingling, saddle anesthesia, incontinence, abdominal pain  Patient verbalized understanding and is agreeable with the plan for discharge  Amount and/or Complexity of Data Reviewed  Clinical lab tests: ordered and reviewed  Tests in the radiology section of CPT®: ordered and reviewed  Tests in the medicine section of CPT®: ordered and reviewed  Independent visualization of images, tracings, or specimens: yes        Disposition  Final diagnoses:   UTI (urinary tract infection)     Time reflects when diagnosis was documented in both MDM as applicable and the Disposition within this note     Time User Action Codes Description Comment    11/12/2022  8:27 PM Harlan Level Add [N39 0] UTI (urinary tract infection)       ED Disposition     ED Disposition   Discharge    Condition   Stable    Date/Time   Sat Nov 12, 2022  8:27 PM    Comment   Griselda Nettles discharge to home/self care                 Follow-up Information     Follow up With Specialties Details Why Contact Info Additional [address], [place] Family Medicine Call in 3 days For follow up [address] [facility], [address]  [address] Urology Call in 3 days For follow up [address] [phone]  [address]  [facility], [address]  [address] [facility], [facility], [place], [address] [facility] Emergency Medicine Go to  If symptoms worsen [address] [facility] [address] [facility], [address] [facility], [place], [place], [address]          Discharge Medication List as of 11/12/2022  8:30 PM      START taking these medications    Details   cephalexin (KEFLEX) 500 mg capsule Take 1 capsule (500 mg total) by mouth every 12 (twelve) hours for 7 days, Starting Sat 11/12/2022, Until Sat 11/19/2022, Normal         CONTINUE these medications which have NOT CHANGED    Details   acetaminophen (TYLENOL) 650 mg CR tablet Take 1 tablet (650 mg total) by mouth every 8 (eight) hours as needed for mild pain, Starting Wed 11/9/2022, Normal      albuterol (PROVENTIL HFA,VENTOLIN HFA) 90 mcg/act inhaler Inhale 2 puffs every 6 (six) hours as needed for wheezing, Historical Med      butalbital-acetaminophen-caffeine-codeine (FIORICET WITH CODEINE) -42-30 MG per capsule Take 1 capsule by mouth every 4 (four) hours as needed for headaches, Historical Med      ibuprofen (MOTRIN) 600 mg tablet Take 1 tablet (600 mg total) by mouth every 6 (six) hours as needed for mild pain, Starting Wed 11/9/2022, Normal      melatonin 1 mg Take 1 mg by mouth daily at bedtime, Historical Med      pantoprazole (PROTONIX) 40 mg tablet Take 1 tablet (40 mg total) by mouth daily, Starting Tue 1/11/2022, Until Wed 11/2/2022, Normal             No discharge procedures on file      PDMP Review     None          ED Provider  Electronically Signed by           James García PA-C  11/21/22 1929

## 2022-11-12 NOTE — TELEPHONE ENCOUNTER
Regarding: Post Op Issues/Bladder, Back Pain  ----- Message from Ivette Faulkner sent at 11/12/2022  3:45 PM EST -----  "My wife had a hysterectomy on Nov 09th  She's experiencing bladder pain, back pain and cannot stand up straight without feeling that she's going to fall over   Do we go to the ER?"

## 2022-11-12 NOTE — TELEPHONE ENCOUNTER
Reason for Disposition  • Patient sounds very sick or weak to the triager    Additional Information  • Negative: Sounds like a serious complication to the triager    Answer Assessment - Initial Assessment Questions  1  SYMPTOM: "What's the main symptom you're concerned about?" (e g , pain, fever, vomiting)      Bladder pain, back pain, and cannot stand straight up  Not urinating fully  2  ONSET: "When did pain  start?"      Started today  3  SURGERY: "What surgery was performed?"      11/09/2022  4  DATE of SURGERY: "When was surgery performed?"       hysterectomy  5  ANESTHESIA: " What type of anesthesia did you have?" (e g , general, spinal, epidural, local)     genral  6  PAIN: "Is there any pain?" If Yes, ask: "How bad is it?"  (Scale 1-10; or mild, moderate, severe)      7/10  7  FEVER: "Do you have a fever?" If Yes, ask: "What is your temperature, how was it measured, and when did it start?"      none  8  VOMITING: "Is there any vomiting?" If yes, ask: "How many times?"      none  9  BLEEDING: "Is there any bleeding?" If Yes, ask: "How much?" and "Where?"      Bleeding from the procedure  10  OTHER SYMPTOMS: "Do you have any other symptoms?" (e g , drainage from wound, painful urination, constipation)        Not able to empty bladder fully      Protocols used: POST-OP SYMPTOMS AND QUESTIONS-[facility]

## 2022-11-13 NOTE — DISCHARGE INSTRUCTIONS
Follow up with PCP  Follow up with urology  Keflex x 7 days  Return to the ED with new or worsening symptoms including but not limited to abdominal pain, urinary retention, bladder or bowel habit changes, bladder or bowel incontinence, or saddle anesthesia

## 2022-11-15 ENCOUNTER — TELEPHONE (OUTPATIENT)
Dept: OBGYN CLINIC | Facility: CLINIC | Age: 27
End: 2022-11-15

## 2022-11-15 NOTE — TELEPHONE ENCOUNTER
Patient needs a return to work note dating from 11/13 , she went to work on 11/13 and they decided she cannot return until she has a note stating she can return

## 2022-11-15 NOTE — LETTER
November 15, 2022     Patient: Sofy Zepeda  YOB: 1995  Date of Visit: 11/15/2022      To Whom it May Concern:    Lamona Angelucci is under my professional care  Arabella Davis  may return to work with limitations 11/14/22  I recommend no lifting over 10 pounds, but no further restrictions  If you have any questions or concerns, please don't hesitate to call           Sincerely,          Xochitl Snell MD        CC: No Recipients

## 2022-11-15 NOTE — TELEPHONE ENCOUNTER
Pt has a total bag hyst 11/9 by ec  States she went back 3/13, but is unable to return with out note   Currently scheduling post op apt

## 2022-11-16 ENCOUNTER — TELEPHONE (OUTPATIENT)
Dept: OBGYN CLINIC | Facility: CLINIC | Age: 27
End: 2022-11-16

## 2022-11-16 LAB
BACTERIA UR CULT: ABNORMAL

## 2022-11-16 NOTE — TELEPHONE ENCOUNTER
Call from patient  Would like provider to review Urine culture results  Patient was not able to  Keflex until yesterday  Was concerned about the urine culture results but advised the Keflex should cover this  Currently only reports some bladder pressure, denies pain/burning with voiding  Minimal vaginal discharge, tolerating regular diet  Ambulating as tolerated  No BM x 10 days(states not uncommon for her)  Passing gas  Advised to increase fluid intake, increase fiber and start stool softener today  Would like her to avoid straining with BM  Patient verbalized understanding  Post op visit scheduled for 11/21

## 2022-11-16 NOTE — TELEPHONE ENCOUNTER
----- Message from Clary Pearson sent at 11/16/2022  4:05 PM EST -----  Regarding: Urinalysis results   Contact: [phone]  Hi Dr Shell Alan,  I got my results back from when I went to the ER  I'm not sure how to read these but they do not look good  I haven't received a call from anyone regarding my results, should I be concerned? Thank you so much

## 2022-11-17 NOTE — TELEPHONE ENCOUNTER
[facility],   Not sure if this got to you but her u/a does look infected  Please advise  Thank you  Jose Miguel Au  [birth year]    TT to [facility]  Per EC, pt needs to take abic  Urine not especially worrisome  I spoke with pt and relayed the message

## 2022-11-29 ENCOUNTER — OFFICE VISIT (OUTPATIENT)
Dept: OBGYN CLINIC | Age: 27
End: 2022-11-29

## 2022-11-29 VITALS
HEIGHT: 64 IN | DIASTOLIC BLOOD PRESSURE: 66 MMHG | BODY MASS INDEX: 25.1 KG/M2 | WEIGHT: 147 LBS | SYSTOLIC BLOOD PRESSURE: 110 MMHG

## 2022-11-29 DIAGNOSIS — Z90.710 S/P VAGINAL HYSTERECTOMY: Primary | ICD-10-CM

## 2022-11-29 PROBLEM — N80.03 ADENOMYOSIS: Status: RESOLVED | Noted: 2021-05-10 | Resolved: 2022-11-29

## 2022-11-29 PROBLEM — N92.0 MENORRHAGIA WITH REGULAR CYCLE: Status: RESOLVED | Noted: 2022-09-19 | Resolved: 2022-11-29

## 2022-11-29 PROBLEM — N94.6 DYSMENORRHEA: Status: RESOLVED | Noted: 2022-09-19 | Resolved: 2022-11-29

## 2022-11-29 NOTE — PROGRESS NOTES
Assessment/Plan:       Problem List Items Addressed This Visit    None  Visit Diagnoses     S/P vaginal hysterectomy    -  Primary            Plan     1  Activity restrictions: can increase activity and weight lifted  Nothing per vagina  Reviewed risk of early intercourse and potential for cuff dehiscience  2  Path and surgery reviewed  Adenomyosis previously suspected, confirmed  3  Follow up: 6 weeks postop for recheck  Subjective:      Patient ID: Mildred Menjivar is a 28 y o  y o  female  HPI She presents today for postoperative follow up  She is feeling very well  Denies fevers, chills, nausea, vomiting, diarrhea, constipation  No difficulty with activity  Back at work without difficulty  Hoping to have intercourse! The following portions of the patient's history were reviewed and updated as appropriate: allergies, current medications, past family history, past medical history, past social history, past surgical history and problem list     Review of Systems  as above    Objective:  Vitals:    11/29/22 0855   BP: 110/66        Physical Exam   Constitutional: She is oriented to person, place, and time  She appears well-developed and well-nourished  HENT:   Head: Normocephalic  Eyes: EOM are normal    Neck: Normal range of motion  Pulmonary/Chest: Effort normal    Abdominal: Soft  She exhibits no distension and no mass  There is no tenderness  There is no rebound and no guarding  Musculoskeletal: Normal range of motion  Neurological: She is alert and oriented to person, place, and time  Skin: Skin is warm and dry  Psychiatric: She has a normal mood and affect  Her behavior is normal    Vitals reviewed

## 2022-12-03 ENCOUNTER — OFFICE VISIT (OUTPATIENT)
Dept: URGENT CARE | Facility: CLINIC | Age: 27
End: 2022-12-03

## 2022-12-03 VITALS
RESPIRATION RATE: 18 BRPM | DIASTOLIC BLOOD PRESSURE: 85 MMHG | SYSTOLIC BLOOD PRESSURE: 110 MMHG | TEMPERATURE: 99.9 F | OXYGEN SATURATION: 97 % | HEART RATE: 119 BPM

## 2022-12-03 DIAGNOSIS — J02.9 SORE THROAT: Primary | ICD-10-CM

## 2022-12-03 DIAGNOSIS — R05.1 ACUTE COUGH: ICD-10-CM

## 2022-12-03 DIAGNOSIS — R50.9 FEVER, UNSPECIFIED FEVER CAUSE: ICD-10-CM

## 2022-12-03 LAB — S PYO AG THROAT QL: NEGATIVE

## 2022-12-03 RX ORDER — LIDOCAINE HYDROCHLORIDE 20 MG/ML
15 SOLUTION OROPHARYNGEAL 4 TIMES DAILY PRN
Qty: 100 ML | Refills: 0 | Status: SHIPPED | OUTPATIENT
Start: 2022-12-03

## 2022-12-03 NOTE — PATIENT INSTRUCTIONS
Rapid strep negative  I suspect your symptoms are due to a viral illness  No antibiotics are indicated at this time  Will check for Covid/Flu  You need to isolate until results are back  Rest and drink extra fluids  Lidocaine swish and spit as needed for throat pain  Flonase nasal spray and saline rinses for sinus pressure and congestion  OTC cold and flu medications as needed  Tylenol/Motrin as needed for pain or fevers  Cool mist humidifier at night  Salt water gargles and chloraseptic spray  Tea with honey  Tylenol/Ibuprofen for pain/fever  Covid vitamins- vitamin D3 2000 IU oral daily, Vitamin C 1 gram oral q 12 hours and multivitamin daily  Follow up with PCP if no improvement  Go to the ER with any worsening symptoms, chest pain, shortness of breath, difficulty breathing, lethargy, confusion, dehydration or change in skin color  If Covid and flu tests are negative, you may discontinue isolation when fever free for 24 hours without the use of a fever reducing agent  If covid or flu test is positive, you may discontinue isolation 5 days after symptom onset and when fever free for 24 hours without the use of a fever reducing agent  Upon discontinuing isolation you must continue to wear a mask for an additional 5 days  Sore Throat, Ambulatory Care   GENERAL INFORMATION:   A sore throat  is often caused by a cold or flu virus  A sore throat may also be caused by bacteria such as strep  Other causes include smoking, a runny nose, allergies, or acid reflux  Seek immediate care for the following symptoms:   Trouble breathing or swallowing because your throat is swollen or sore    Drooling because it hurts too much to swallow    A painful lump in your throat that does not go away after 5 days    A fever higher than 102? F (39?C) or lasts longer than 3 days    Confusion    Blood in your throat or ear  Treatment for a sore throat  will depend on the cause how severe it is   A sore throat cause by a virus will go away on its own without treatment  You will need antibiotics if your sore throat is caused by bacteria  Your sore throat should start to feel better within 3 to 5 days for both viral and bacterial infections  Care for your sore throat:   Gargle with salt water  Mix ¼ teaspoon salt in a glass of warm water and gargle  This may help reduce swelling in your throat  Take ibuprofen or acetaminophen:  These medicines decrease pain and fever  They are available without a doctor's order  Ask your healthcare provider which medicine is best for you  Ask how much to take and how often to take it  Drink more liquids  Cold or warm drinks may help soothe your sore throat  Drinking liquids can also help prevent dehydration  Use a cool-steam humidifier  to help moisten the air in your room and reduce your throat pain  Use lozenges, ice, soft foods, or popsicles  to soothe your throat  Rest your throat as much as possible  Try not to use your voice  This may irritate your throat and worsen your symptoms  Follow up with your healthcare provider as directed:  Write down your questions so you remember to ask them during your visits  CARE AGREEMENT:   You have the right to help plan your care  Learn about your health condition and how it may be treated  Discuss treatment options with your caregivers to decide what care you want to receive  You always have the right to refuse treatment  The above information is an  only  It is not intended as medical advice for individual conditions or treatments  Talk to your doctor, nurse or pharmacist before following any medical regimen to see if it is safe and effective for you  © 2014 [address] is for End User's use only and may not be sold, redistributed or otherwise used for commercial purposes  All illustrations and images included in CareNotes® are the copyrighted property of [employer]  or Osval[employer] Galindo

## 2022-12-03 NOTE — PROGRESS NOTES
[facility]        NAME: Bobbi Ceron is a 28 y o  female  : 1995    MRN: [id number]  DATE: December 3, 2022  TIME: 9:20 AM    Assessment and Plan   Sore throat [J02 9]  1  Sore throat  POCT rapid strepA      2  Acute cough  Covid/Flu-Office Collect      3  Fever, unspecified fever cause  Covid/Flu-Office Collect    POCT rapid strepA            Patient Instructions     Patient Instructions   Rapid strep negative  I suspect your symptoms are due to a viral illness  No antibiotics are indicated at this time  Will check for Covid/Flu  You need to isolate until results are back  Rest and drink extra fluids  Lidocaine swish and spit as needed for throat pain  Flonase nasal spray and saline rinses for sinus pressure and congestion  OTC cold and flu medications as needed  Tylenol/Motrin as needed for pain or fevers  Cool mist humidifier at night  Salt water gargles and chloraseptic spray  Tea with honey  Tylenol/Ibuprofen for pain/fever  Covid vitamins- vitamin D3 2000 IU oral daily, Vitamin C 1 gram oral q 12 hours and multivitamin daily  Follow up with PCP if no improvement  Go to the ER with any worsening symptoms, chest pain, shortness of breath, difficulty breathing, lethargy, confusion, dehydration or change in skin color  If Covid and flu tests are negative, you may discontinue isolation when fever free for 24 hours without the use of a fever reducing agent  If covid or flu test is positive, you may discontinue isolation 5 days after symptom onset and when fever free for 24 hours without the use of a fever reducing agent  Upon discontinuing isolation you must continue to wear a mask for an additional 5 days  Sore Throat, Ambulatory Care   GENERAL INFORMATION:   A sore throat  is often caused by a cold or flu virus  A sore throat may also be caused by bacteria such as strep  Other causes include smoking, a runny nose, allergies, or acid reflux     Seek immediate care for the following symptoms:   · Trouble breathing or swallowing because your throat is swollen or sore    · Drooling because it hurts too much to swallow    · A painful lump in your throat that does not go away after 5 days    · A fever higher than 102? F (39?C) or lasts longer than 3 days    · Confusion    · Blood in your throat or ear  Treatment for a sore throat  will depend on the cause how severe it is  A sore throat cause by a virus will go away on its own without treatment  You will need antibiotics if your sore throat is caused by bacteria  Your sore throat should start to feel better within 3 to 5 days for both viral and bacterial infections  Care for your sore throat:   · Gargle with salt water  Mix ¼ teaspoon salt in a glass of warm water and gargle  This may help reduce swelling in your throat  · Take ibuprofen or acetaminophen:  These medicines decrease pain and fever  They are available without a doctor's order  Ask your healthcare provider which medicine is best for you  Ask how much to take and how often to take it  · Drink more liquids  Cold or warm drinks may help soothe your sore throat  Drinking liquids can also help prevent dehydration  · Use a cool-steam humidifier  to help moisten the air in your room and reduce your throat pain  · Use lozenges, ice, soft foods, or popsicles  to soothe your throat  · Rest your throat as much as possible  Try not to use your voice  This may irritate your throat and worsen your symptoms  Follow up with your healthcare provider as directed:  Write down your questions so you remember to ask them during your visits  CARE AGREEMENT:   You have the right to help plan your care  Learn about your health condition and how it may be treated  Discuss treatment options with your caregivers to decide what care you want to receive  You always have the right to refuse treatment  The above information is an  only   It is not intended as medical advice for individual conditions or treatments  Talk to your doctor, nurse or pharmacist before following any medical regimen to see if it is safe and effective for you  © 2014 [address] is for End User's use only and may not be sold, redistributed or otherwise used for commercial purposes  All illustrations and images included in CareNotes® are the copyrighted property of [employer]  or [employer]  Chief Complaint     Chief Complaint   Patient presents with   • Cold Like Symptoms     Pt c/o b/l ear pain/pressure, sinus/nasal congestion, sore throat-right side, today is day 3  History of Present Illness     Erick Menezes is a 28 y o  female presenting to the office today for upper respiratory complaints  Symptoms have been present for 3 days, and include nasal congestion, sinus pressure, sore throat, painful swallowing, ear pain, fevers up to 101, and dry cough   Denies SOB, wheezing, chest pain, N/V/D  She has tried tylenol, motrin, chloraseptic throat spray for her symptoms, without relief  Sick contacts include: children sick recently      Review of Systems     Review of Systems   Constitutional: Positive for chills and fever  Negative for fatigue  HENT: Positive for congestion, ear pain, sinus pressure and sore throat  Negative for rhinorrhea  Painful swallowing   Eyes: Negative for discharge, redness and visual disturbance  Respiratory: Positive for cough  Negative for shortness of breath and wheezing  Cardiovascular: Negative for chest pain and palpitations  Gastrointestinal: Negative for abdominal pain, diarrhea, nausea and vomiting  Musculoskeletal: Negative for arthralgias and myalgias  Skin: Negative for color change and rash  Neurological: Negative for dizziness, weakness and headaches         Current Medications       Current Outpatient Medications:   •  acetaminophen (TYLENOL) 650 mg CR tablet, Take 1 tablet (650 mg total) by mouth every 8 (eight) hours as needed for mild pain, Disp: 30 tablet, Rfl: 0  •  albuterol (PROVENTIL HFA,VENTOLIN HFA) 90 mcg/act inhaler, Inhale 2 puffs every 6 (six) hours as needed for wheezing, Disp: , Rfl:   •  butalbital-acetaminophen-caffeine-codeine (FIORICET WITH CODEINE) -24-30 MG per capsule, Take 1 capsule by mouth every 4 (four) hours as needed for headaches, Disp: , Rfl:   •  ibuprofen (MOTRIN) 600 mg tablet, Take 1 tablet (600 mg total) by mouth every 6 (six) hours as needed for mild pain, Disp: 30 tablet, Rfl: 0  •  melatonin 1 mg, Take 1 mg by mouth daily at bedtime, Disp: , Rfl:   •  pantoprazole (PROTONIX) 40 mg tablet, Take 1 tablet (40 mg total) by mouth daily, Disp: 30 tablet, Rfl: 1    Current Allergies     Allergies as of 2022 - Reviewed 2022   Allergen Reaction Noted   • Imitrex [sumatriptan] Anaphylaxis 10/06/2020   • Penicillins Anaphylaxis 10/16/2018   • Codeine GI Intolerance 10/16/2018   • Doxycycline GI Intolerance 10/16/2018   • Zithromax [azithromycin] GI Intolerance 10/16/2018            The following portions of the patient's history were reviewed and updated as appropriate: allergies, current medications, past family history, past medical history, past social history, past surgical history and problem list      Past Medical History:   Diagnosis Date   • Anxiety    • Asthma     cold weather, exercise induced   • GERD (gastroesophageal reflux disease)    • Irritable bowel syndrome    • Migraines        Past Surgical History:   Procedure Laterality Date   • CHROMOSOME ANALYSIS, PRODUCTS OF CONCEPTION (HISTORICAL)     • DILATION AND CURETTAGE OF UTERUS      d&E,    • HYSTERECTOMY N/A    • UT COLONOSCOPY FLX DX W/COLLJ SPEC WHEN PFRMD N/A 10/31/2018    Procedure: EGD AND COLONOSCOPY;  Surgeon: Kamini Valiente MD;  Location: [facility];   Service: Gastroenterology   • UT DILATION/CURETTAGE,DIAGNOSTIC N/A 2022    Procedure: D&C Suction; Surgeon: Jatin Goel MD;  Location: [facility];  Service: Gynecology   • CO VAG HYST,RMV TUBE/OVARY Bilateral 11/09/2022    Procedure: TOTAL VAGINAL HYSTERECTOMY BILATERAL SALPINGECTOMY RIGHT OOPHORECTOMY;  Surgeon: Genaro Maria MD;  Location:  MAIN OR;  Service: Gynecology   • SOFT TISSUE MASS EXCISION     • TONSILECTOMY, ADENOIDECTOMY, BILATERAL MYRINGOTOMY AND TUBES     • WISDOM TOOTH EXTRACTION         Family History   Problem Relation Age of Onset   • Diabetes Mother    • Hypertension Father    • Heart disease Father    • Stroke Father        Medications have been verified  Objective     /85   Pulse (!) 119   Temp 99 9 °F (37 7 °C)   Resp 18   LMP 08/01/2022 (Exact Date)   SpO2 97%   Patient's last menstrual period was 08/01/2022 (exact date)  Physical Exam     Physical Exam  Vitals and nursing note reviewed  Constitutional:       General: She is not in acute distress  Appearance: Normal appearance  HENT:      Head: Normocephalic and atraumatic  Right Ear: Tympanic membrane and ear canal normal       Left Ear: Tympanic membrane and ear canal normal       Nose: Congestion and rhinorrhea present  Rhinorrhea is clear  Right Sinus: No maxillary sinus tenderness or frontal sinus tenderness  Left Sinus: No maxillary sinus tenderness or frontal sinus tenderness  Mouth/Throat:      Mouth: Mucous membranes are moist       Pharynx: Uvula midline  Posterior oropharyngeal erythema present  No oropharyngeal exudate or uvula swelling  Comments: H/o tonsillectomy  Eyes:      Conjunctiva/sclera: Conjunctivae normal    Cardiovascular:      Rate and Rhythm: Regular rhythm  Tachycardia present  Heart sounds: Normal heart sounds  Pulmonary:      Effort: Pulmonary effort is normal       Breath sounds: Normal breath sounds  No wheezing, rhonchi or rales  Lymphadenopathy:      Cervical: No cervical adenopathy  Skin:     General: Skin is warm and dry  Neurological:      Mental Status: She is alert  Psychiatric:         Behavior: Behavior is cooperative

## 2022-12-05 LAB
FLUAV RNA RESP QL NAA+PROBE: NEGATIVE
FLUBV RNA RESP QL NAA+PROBE: NEGATIVE
SARS-COV-2 RNA RESP QL NAA+PROBE: NEGATIVE

## 2022-12-08 ENCOUNTER — OFFICE VISIT (OUTPATIENT)
Dept: URGENT CARE | Facility: CLINIC | Age: 27
End: 2022-12-08

## 2022-12-08 VITALS — OXYGEN SATURATION: 97 % | TEMPERATURE: 98.1 F | HEART RATE: 94 BPM

## 2022-12-08 DIAGNOSIS — J45.909 MILD REACTIVE AIRWAYS DISEASE, UNSPECIFIED WHETHER PERSISTENT: Primary | ICD-10-CM

## 2022-12-08 RX ORDER — ALBUTEROL SULFATE 90 UG/1
2 AEROSOL, METERED RESPIRATORY (INHALATION) EVERY 6 HOURS PRN
Qty: 8 G | Refills: 1 | Status: SHIPPED | OUTPATIENT
Start: 2022-12-08

## 2022-12-08 NOTE — PATIENT INSTRUCTIONS
Bronchospasm   WHAT YOU NEED TO KNOW:   Bronchospasm is a narrowing of the airway that usually comes and goes  You may be at risk for bronchospasm if you have a chest cold or allergies  You may also be at risk if you are bothered by air pollution, certain medicines, cold, dry air, smoke, or strong odors  Exercise may worsen your symptoms  Bronchospasms may make it hard for you to breathe  Severe bronchospasm may become life-threatening  DISCHARGE INSTRUCTIONS:   Call your local emergency number (911 in the [address] [facility]) if:   You have chest pain  You have severe shortness of breath or trouble breathing  Return to the emergency department if:   You cough or spit up blood  You are short of breath  You have blue fingernails or toenails  Your heartbeat is fast or not even  Call your doctor or pulmonologist if:   You have a fever  You have a cough that will not go away  Your wheezing worsens  You have questions or concerns about your condition or care  Medicines: You may need any of the following:  Bronchodilators  help expand your airway for easier breathing  Some of these medicines may help prevent future spasms  Inhaled steroids  help reduce swelling in your airway and soothe your breathing  These are used for long-term control  Anticholinergics  help relax and open your airway  Take your medicine as directed  Contact your healthcare provider if you think your medicine is not helping or if you have side effects  Tell him of her if you are allergic to any medicine  Keep a list of the medicines, vitamins, and herbs you take  Include the amounts, and when and why you take them  Bring the list or the pill bottles to follow-up visits  Carry your medicine list with you in case of an emergency  Prevent bronchospasms:   Avoid triggers  Your healthcare provider can help you identify your triggers  You may need to keep a diary of your symptoms   Include where you were and what you were doing when symptoms started  Also include how long symptoms lasted  Make a note of anything that helped or made your symptoms worse  Bring your diary to visits with your healthcare provider  He or she may also recommend skin prick tests or other tests to help find triggers  Warm up before you exercise  Ask your healthcare provider about the best exercise plan for you  Keep your immune system healthy  Try to avoid people who are sick  Ask your healthcare provider about vaccines you may need  Vaccines help prevent certain infections that can cause breathing problems  Get a flu vaccine as soon as recommended each year, usually in September or October  Vaccines are also available to prevent COVID-19 and pneumonia  Your provider can tell you if you also need other vaccines, and when to get them  Breathe through your nose when you are in cold, dry air or weather  This may help reduce lung irritation by warming the air before it reaches your lungs  Follow up with your doctor or pulmonologist as directed: You may need more testing to find the cause of your condition  Write down your questions so you remember to ask them during your visits  © Copyright [address] 2022 Information is for End User's use only and may not be sold, redistributed or otherwise used for commercial purposes  All illustrations and images included in CareNotes® are the copyrighted property of A [employer] M [employer] Inc  or [facility] [employer] Angel Lyles   The above information is an  only  It is not intended as medical advice for individual conditions or treatments  Talk to your doctor, nurse or pharmacist before following any medical regimen to see if it is safe and effective for you

## 2022-12-08 NOTE — PROGRESS NOTES
[facility]        NAME: Christofer Zhang is a 28 y o  female  : 1995    MRN: [id number]  DATE: 2022  TIME: 9:14 AM    Assessment and Plan   Mild reactive airways disease, unspecified whether persistent [J45 909]  1  Mild reactive airways disease, unspecified whether persistent  albuterol (Proventil HFA) 90 mcg/act inhaler            Patient Instructions       Follow up with PCP in 3-5 days  Proceed to  ER if symptoms worsen  Chief Complaint     Chief Complaint   Patient presents with   • asthma     Pt still fighting viral infection and needs replacement  inhaler  History of Present Illness       29 yo female with a URI for 9 days  Tested at our  for Influenza and Covid which were negative  History of of mild intermittent asthma  Patient currently denies shortness of breath, wheezing, dyspnea, sputum, hemoptysis, fever or chills  Review of Systems   Review of Systems   Constitutional: Negative for activity change, appetite change, chills, fatigue and fever  HENT: Positive for congestion, postnasal drip, rhinorrhea and sore throat  Negative for ear pain and sinus pressure  Eyes: Negative for photophobia and visual disturbance  Respiratory: Positive for cough and chest tightness  Negative for shortness of breath and wheezing  Cardiovascular: Negative for chest pain and palpitations  Gastrointestinal: Negative for abdominal pain, diarrhea, nausea and vomiting  Musculoskeletal: Negative for arthralgias, myalgias and neck stiffness  Skin: Negative for color change and rash  Neurological: Negative for dizziness, weakness and headaches  Psychiatric/Behavioral: Negative            Current Medications       Current Outpatient Medications:   •  acetaminophen (TYLENOL) 650 mg CR tablet, Take 1 tablet (650 mg total) by mouth every 8 (eight) hours as needed for mild pain, Disp: 30 tablet, Rfl: 0  •  albuterol (Proventil HFA) 90 mcg/act inhaler, Inhale 2 puffs every 6 (six) hours as needed for wheezing or shortness of breath, Disp: 8 g, Rfl: 1  •  butalbital-acetaminophen-caffeine-codeine (FIORICET WITH CODEINE) -25-30 MG per capsule, Take 1 capsule by mouth every 4 (four) hours as needed for headaches, Disp: , Rfl:   •  ibuprofen (MOTRIN) 600 mg tablet, Take 1 tablet (600 mg total) by mouth every 6 (six) hours as needed for mild pain, Disp: 30 tablet, Rfl: 0  •  Lidocaine Viscous HCl (XYLOCAINE) 2 % mucosal solution, Swish and spit 15 mL 4 (four) times a day as needed for mouth pain or discomfort, Disp: 100 mL, Rfl: 0  •  melatonin 1 mg, Take 1 mg by mouth daily at bedtime, Disp: , Rfl:   •  pantoprazole (PROTONIX) 40 mg tablet, Take 1 tablet (40 mg total) by mouth daily, Disp: 30 tablet, Rfl: 1    Current Allergies     Allergies as of 2022 - Reviewed 2022   Allergen Reaction Noted   • Imitrex [sumatriptan] Anaphylaxis 10/06/2020   • Penicillins Anaphylaxis 10/16/2018   • Codeine GI Intolerance 10/16/2018   • Doxycycline GI Intolerance 10/16/2018   • Zithromax [azithromycin] GI Intolerance 10/16/2018            The following portions of the patient's history were reviewed and updated as appropriate: allergies, current medications, past family history, past medical history, past social history, past surgical history and problem list      Past Medical History:   Diagnosis Date   • Anxiety    • Asthma     cold weather, exercise induced   • GERD (gastroesophageal reflux disease)    • Irritable bowel syndrome    • Migraines        Past Surgical History:   Procedure Laterality Date   • CHROMOSOME ANALYSIS, PRODUCTS OF CONCEPTION (HISTORICAL)     • DILATION AND CURETTAGE OF UTERUS      d&E,    • HYSTERECTOMY N/A    • ME COLONOSCOPY FLX DX W/COLLJ SPEC WHEN PFRMD N/A 10/31/2018    Procedure: EGD AND COLONOSCOPY;  Surgeon: Zhao Rodriguez MD;  Location: [facility];   Service: Gastroenterology   • ME DILATION/CURETTAGE,DIAGNOSTIC N/A 2022 Procedure: D&C Suction;  Surgeon: Evelina Lewis MD;  Location: [facility];  Service: Gynecology   • SC VAG HYST,RMV TUBE/OVARY Bilateral 11/09/2022    Procedure: TOTAL VAGINAL HYSTERECTOMY BILATERAL SALPINGECTOMY RIGHT OOPHORECTOMY;  Surgeon: Dandy Snider MD;  Location:  MAIN OR;  Service: Gynecology   • SOFT TISSUE MASS EXCISION     • TONSILECTOMY, ADENOIDECTOMY, BILATERAL MYRINGOTOMY AND TUBES     • WISDOM TOOTH EXTRACTION         Family History   Problem Relation Age of Onset   • Diabetes Mother    • Hypertension Father    • Heart disease Father    • Stroke Father          Medications have been verified  Objective   Pulse 94   Temp 98 1 °F (36 7 °C)   LMP 08/01/2022 (Exact Date)   SpO2 97%        Physical Exam     Physical Exam  Vitals and nursing note reviewed  Constitutional:       General: She is not in acute distress  Appearance: Normal appearance  She is well-developed and normal weight  She is not ill-appearing  HENT:      Head: Normocephalic and atraumatic  Right Ear: Tympanic membrane, ear canal and external ear normal       Left Ear: Tympanic membrane, ear canal and external ear normal       Nose: Congestion and rhinorrhea present  Mouth/Throat:      Mouth: Mucous membranes are moist  No oral lesions  Pharynx: No oropharyngeal exudate or posterior oropharyngeal erythema  Comments: Hyperemia posterior throat with clear postnasal drip noted  Eyes:      Extraocular Movements: Extraocular movements intact  Right eye: Normal extraocular motion  Left eye: Normal extraocular motion  Conjunctiva/sclera: Conjunctivae normal       Pupils: Pupils are equal, round, and reactive to light  Cardiovascular:      Rate and Rhythm: Normal rate and regular rhythm  Pulses: Normal pulses  Heart sounds: Normal heart sounds  Pulmonary:      Effort: Pulmonary effort is normal  No respiratory distress  Breath sounds: Rhonchi present   No wheezing  Musculoskeletal:         General: Normal range of motion  Cervical back: Normal range of motion and neck supple  No tenderness  Lymphadenopathy:      Cervical: No cervical adenopathy  Skin:     General: Skin is warm and dry  Capillary Refill: Capillary refill takes less than 2 seconds  Findings: No lesion or rash  Neurological:      General: No focal deficit present  Mental Status: She is alert and oriented to person, place, and time  Gait: Gait normal    Psychiatric:         Mood and Affect: Mood normal          Behavior: Behavior normal          Thought Content:  Thought content normal          Judgment: Judgment normal

## 2022-12-08 NOTE — LETTER
December 8, 2022     Patient: Eva Sanchez   YOB: 1995   Date of Visit: 12/8/2022       To Whom it May Concern:    Magalie Sánchez was seen in my clinic on 12/8/2022  She may return to work on 12/9/2022  If you have any questions or concerns, please don't hesitate to call           Sincerely,          Kirsten Barrios PA-C        CC: No Recipients

## 2022-12-19 ENCOUNTER — OFFICE VISIT (OUTPATIENT)
Dept: OBGYN CLINIC | Age: 27
End: 2022-12-19

## 2022-12-19 VITALS
HEIGHT: 64 IN | SYSTOLIC BLOOD PRESSURE: 100 MMHG | DIASTOLIC BLOOD PRESSURE: 66 MMHG | WEIGHT: 147 LBS | BODY MASS INDEX: 25.1 KG/M2

## 2022-12-19 DIAGNOSIS — Z90.710 S/P VAGINAL HYSTERECTOMY: Primary | ICD-10-CM

## 2022-12-19 NOTE — PROGRESS NOTES
Assessment/Plan:       Problem List Items Addressed This Visit    None  Visit Diagnoses     S/P vaginal hysterectomy    -  Primary            Plan     1  Continue any current medications  2  Activity restrictions: can gradually increase activity and weight lifted  Enrike  for intercourse  3  Follow up: routine gyn exams      Subjective:      Patient ID: Ava Pabon is a 28 y o  y o  female  HPI She presents today for 6 week postoperative follow up  She is feeling very well  Denies fevers, chills, nausea, vomiting, diarrhea, constipation  No difficulty with activity  The following portions of the patient's history were reviewed and updated as appropriate: allergies, current medications, past family history, past medical history, past social history, past surgical history and problem list     Review of Systems  as above    Objective:  Vitals:    12/19/22 0859   BP: 100/66        Physical Exam   Constitutional: She is oriented to person, place, and time  She appears well-developed and well-nourished  HENT:   Head: Normocephalic  Eyes: EOM are normal    Neck: Normal range of motion  Pulmonary/Chest: Effort normal    Abdominal: Soft  She exhibits no distension and no mass  There is no tenderness  There is no rebound and no guarding  Vaginal cuff well healed without any apparent granulation tissue  Musculoskeletal: Normal range of motion  Neurological: She is alert and oriented to person, place, and time  Skin: Skin is warm and dry  Psychiatric: She has a normal mood and affect  Her behavior is normal    Vitals reviewed

## 2023-01-29 ENCOUNTER — HOSPITAL ENCOUNTER (EMERGENCY)
Facility: HOSPITAL | Age: 28
Discharge: HOME | End: 2023-01-29
Attending: EMERGENCY MEDICINE

## 2023-01-29 VITALS
HEART RATE: 111 BPM | SYSTOLIC BLOOD PRESSURE: 136 MMHG | OXYGEN SATURATION: 100 % | RESPIRATION RATE: 18 BRPM | TEMPERATURE: 98.1 F | DIASTOLIC BLOOD PRESSURE: 66 MMHG

## 2023-01-29 DIAGNOSIS — B37.9 CANDIDIASIS: Primary | ICD-10-CM

## 2023-01-29 LAB
BACTERIA UR QL AUTO: ABNORMAL /HPF
BILIRUB UR QL STRIP: NEGATIVE
CLARITY UR: ABNORMAL
COLOR UR: YELLOW
GLUCOSE UR STRIP-MCNC: NEGATIVE MG/DL
HGB UR QL STRIP.AUTO: NEGATIVE
KETONES UR STRIP-MCNC: ABNORMAL MG/DL
LEUKOCYTE ESTERASE UR QL STRIP: ABNORMAL
MUCOUS THREADS UR QL AUTO: ABNORMAL
NITRITE UR QL STRIP: NEGATIVE
NON-SQ EPI CELLS URNS QL MICRO: ABNORMAL /HPF
PH UR STRIP.AUTO: 5.5 [PH]
PROT UR STRIP-MCNC: ABNORMAL MG/DL
RBC #/AREA URNS AUTO: ABNORMAL /HPF
SP GR UR STRIP.AUTO: 1.03 (ref 1–1.03)
UROBILINOGEN UR STRIP-ACNC: <2 MG/DL
WBC #/AREA URNS AUTO: ABNORMAL /HPF

## 2023-01-29 RX ORDER — CLINDAMYCIN HYDROCHLORIDE 300 MG/1
300 CAPSULE ORAL 4 TIMES DAILY
Qty: 40 CAPSULE | Refills: 0 | Status: SHIPPED | OUTPATIENT
Start: 2023-01-29 | End: 2023-02-08

## 2023-01-29 RX ORDER — FLUCONAZOLE 200 MG/1
200 TABLET ORAL DAILY
Qty: 7 TABLET | Refills: 0 | Status: SHIPPED | OUTPATIENT
Start: 2023-01-29 | End: 2023-02-05

## 2023-01-29 RX ORDER — FLUCONAZOLE 100 MG/1
200 TABLET ORAL ONCE
Status: COMPLETED | OUTPATIENT
Start: 2023-01-29 | End: 2023-01-29

## 2023-01-29 RX ADMIN — FLUCONAZOLE 200 MG: 100 TABLET ORAL at 14:04

## 2023-01-30 LAB
C TRACH DNA SPEC QL NAA+PROBE: NEGATIVE
N GONORRHOEA DNA SPEC QL NAA+PROBE: NEGATIVE

## 2023-01-31 LAB
M GENITALIUM DNA SPEC QL NAA+PROBE: NEGATIVE
T VAGINALIS DNA SPEC QL NAA+PROBE: NEGATIVE

## 2023-02-01 NOTE — ED PROVIDER NOTES
History  Chief Complaint   Patient presents with   • Vaginal Swelling     Pt reports external vaginal swelling and redness, some itching starting 5 days ago, gets worse daily  Denies discharge, bleeding or odor     23-year-old female patient presents emergency department for evaluation of vaginal discomfort  The patient states that she has had several days of difficulty with pain  The patient initially was concerned that she had an abscess that she has had Bartholin gland abscess in the past and started taking Keflex  The patient then ran out of Keflex and began taking 5 days of clindamycin  The patient's symptoms, however, have persisted  These were not prescribed for this bout of discomfort  This was from some sort of a previous infection that the patient had  On physical exam the patient has a stereotypical beefy red vagina with a cottage cheeselike discharge concerning for candidal infection  I discussed this with the patient, I also explained to her that because she has been on 2 different types of antibiotics, I have not seen the area prior to today and she is already on day 5 of treatment that there is the possibility that she had some sort of a cellulitic infection prior to coming here to the emergency department and because of this I recommend that she continue with a full 10-day course of clindamycin    I explained that this was fully due to the fact that nobody had seen this area prior to today, nobody could actually ascertain as to whether or not there was a previous infection but since I have to assume that there was an infection previously or least that it is highly likely that there was an infection previously we will continue the course of antibiotics and I am starting to treat the patient for what I believed to be the cause of her discomfort being a candidal infection of the vagina      History provided by:  Patient   used: No    Medical Problem  Severity:  Mild  Onset quality:  Gradual  Timing:  Constant  Progression:  Worsening  Chronicity:  New  Associated symptoms: no chest pain, no cough, no fever and no shortness of breath        Prior to Admission Medications   Prescriptions Last Dose Informant Patient Reported? Taking? Lidocaine Viscous HCl (XYLOCAINE) 2 % mucosal solution   No No   Sig: Swish and spit 15 mL 4 (four) times a day as needed for mouth pain or discomfort   acetaminophen (TYLENOL) 650 mg CR tablet   No No   Sig: Take 1 tablet (650 mg total) by mouth every 8 (eight) hours as needed for mild pain   albuterol (Proventil HFA) 90 mcg/act inhaler   No No   Sig: Inhale 2 puffs every 6 (six) hours as needed for wheezing or shortness of breath   butalbital-acetaminophen-caffeine-codeine (FIORICET WITH CODEINE) -22-30 MG per capsule   Yes No   Sig: Take 1 capsule by mouth every 4 (four) hours as needed for headaches   ibuprofen (MOTRIN) 600 mg tablet   No No   Sig: Take 1 tablet (600 mg total) by mouth every 6 (six) hours as needed for mild pain   melatonin 1 mg   Yes No   Sig: Take 1 mg by mouth daily at bedtime   pantoprazole (PROTONIX) 40 mg tablet   No No   Sig: Take 1 tablet (40 mg total) by mouth daily      Facility-Administered Medications: None       Past Medical History:   Diagnosis Date   • Anxiety    • Asthma     cold weather, exercise induced   • GERD (gastroesophageal reflux disease)    • Irritable bowel syndrome    • Migraines        Past Surgical History:   Procedure Laterality Date   • CHROMOSOME ANALYSIS, PRODUCTS OF CONCEPTION (HISTORICAL)     • DILATION AND CURETTAGE OF UTERUS      d&E,    • HYSTERECTOMY N/A    • IN COLONOSCOPY FLX DX W/COLLJ SPEC WHEN PFRMD N/A 10/31/2018    Procedure: EGD AND COLONOSCOPY;  Surgeon: Lexa Turner MD;  Location: [facility];   Service: Gastroenterology   • IN DILATION & CURETTAGE DX&/THER NONOBSTETRIC N/A 2022    Procedure: D&C Suction;  Surgeon: Zain Baptiste MD;  Location: [facility]; Service: Gynecology   • NH VAG HYST 250 GM/< W/RMVL TUBE&/OVARY Bilateral 2022    Procedure: TOTAL VAGINAL HYSTERECTOMY BILATERAL SALPINGECTOMY RIGHT OOPHORECTOMY;  Surgeon: Liv Milton MD;  Location: [facility];  Service: Gynecology   • SOFT TISSUE MASS EXCISION     • TONSILECTOMY, ADENOIDECTOMY, BILATERAL MYRINGOTOMY AND TUBES     • WISDOM TOOTH EXTRACTION         Family History   Problem Relation Age of Onset   • Diabetes Mother    • Hypertension Father    • Heart disease Father    • Stroke Father      I have reviewed and agree with the history as documented  E-Cigarette/Vaping   • E-Cigarette Use Former User      E-Cigarette/Vaping Substances   • Nicotine No    • THC No    • CBD No    • Flavoring No    • Other No    • Unknown No      Social History     Tobacco Use   • Smoking status: Every Day     Packs/day: 0 25     Types: Cigarettes     Last attempt to quit: 2022     Years since quittin 4   • Smokeless tobacco: Never   Vaping Use   • Vaping Use: Former   Substance Use Topics   • Alcohol use: No     Comment: rarely   • Drug use: No       Review of Systems   Constitutional: Negative for fever  Respiratory: Negative for cough and shortness of breath  Cardiovascular: Negative for chest pain  All other systems reviewed and are negative  Physical Exam  Physical Exam  Vitals and nursing note reviewed  Constitutional:       Appearance: She is well-developed  HENT:      Head: Normocephalic and atraumatic  Right Ear: External ear normal       Left Ear: External ear normal    Eyes:      Conjunctiva/sclera: Conjunctivae normal    Neck:      Thyroid: No thyromegaly  Vascular: No JVD  Trachea: No tracheal deviation  Cardiovascular:      Rate and Rhythm: Normal rate  Pulmonary:      Effort: Pulmonary effort is normal       Breath sounds: Normal breath sounds  No stridor  Abdominal:      General: There is no distension  Palpations: Abdomen is soft   There is no mass       Tenderness: There is no abdominal tenderness  There is no guarding  Hernia: No hernia is present  Musculoskeletal:         General: No tenderness or deformity  Normal range of motion  Lymphadenopathy:      Cervical: No cervical adenopathy  Skin:     General: Skin is warm  Coloration: Skin is not pale  Findings: No erythema or rash  Neurological:      Mental Status: She is alert and oriented to person, place, and time  Psychiatric:         Behavior: Behavior normal          Vital Signs  ED Triage Vitals [01/29/23 1246]   Temperature Pulse Respirations Blood Pressure SpO2   98 1 °F (36 7 °C) (!) 111 18 136/66 100 %      Temp Source Heart Rate Source Patient Position - Orthostatic VS BP Location FiO2 (%)   Oral Monitor Sitting Left arm --      Pain Score       --           Vitals:    01/29/23 1246   BP: 136/66   Pulse: (!) 111   Patient Position - Orthostatic VS: Sitting         Visual Acuity      ED Medications  Medications   fluconazole (DIFLUCAN) tablet 200 mg (200 mg Oral Given 1/29/23 1404)       Diagnostic Studies  Results Reviewed     Procedure Component Value Units Date/Time    Trichomonas vaginalis/Mycoplasma genitalium PCR [[id number]]  (Normal) Collected: 01/29/23 1403    Lab Status: Final result Specimen: Urine, Clean Catch Updated: 01/31/23 0036     Trichomonas vaginalis Negative     Mycoplasma genitalium Negative    Narrative: This test was performed using the FDA-approved Carol 6800 TV/MG assay ([employer])  This test uses real-time PCR to detect Trichomonas vaginalis (TV) and Mycoplasma genitalium (MG) DNA, to assist in identification of suspected infections  This instrument and assay have been validated by the  and performing laboratory and verified by the performing laboratory  Clinically validated specimen sources include urine and swabs (endocervical/vaginal)  This test has not been evaluated in patients younger than 25years of age  Cervical specimens collected in PreservCyt® Solution are validated for the detection of Trichomonas vaginalis only  Note that vaginal swabs are considered to be the most sensitive specimen type for MG testing  Trichomonas vaginalis is a protozoan/amoebic infection that can be transmitted with sexual contact  Appropriate treatment of oneself and of sexual partners should be sought to avoid re-infection  Mycoplasma genitalium is an increasingly identified and treatable bacterial infection and may be asymptomatic  Long term complications may result from untreated infections  Sexual transmission is possible  Procedural Limitations  This assay has only been validated for use with male and female urine, clinician-instructed self-collected vaginal swab specimens, clinician-collected vaginal swab specimens, endocervical swab specimens collected in Matcha® PCR Media  This assay also detects TV DNA in cervical specimens collected in PreservCyt® Solution  Assay performance has not been validated for use with other collection media and/or specimen types  Detection of Trichomonas vaginalis and/or Mycoplasma genitalium is dependent on the number of organisms present in the specimen  Detection may be affected by specimen collection methods, patient factors, stage of infection, infecting strains, and presence of PCR inhibitors  A negative result does not preclude the presence of infection as results depend on adequate specimen collection, absence of inhibitors, and sufficient DNA to be detected  All results should be interpreted in conjunction with other clinical and laboratory data  The presence of mucus in endocervical specimens may lead to false or invalid results  Urine testing is recommended to be performed on first catch urine samples  The effects of other collection variables have not been evaluated at this time   The effects of vaginal discharge, tampon use, douching, and other collection variables have not been evaluated at this time  This assay has not been evaluated with patients currently being treated with antimicrobial agents active against TV or MG, or patients with a history of hysterectomy  Chlamydia/GC amplified DNA by PCR [[id number]]  (Normal) Collected: 01/29/23 1400    Lab Status: Final result Specimen: Urine, Other Updated: 01/30/23 1445     N gonorrhoeae, DNA Probe Negative     Chlamydia trachomatis, DNA Probe Negative    Narrative: This test was performed using the FDA-approved Carol Array Bridge0 CT/NG assay ([employer])  This test uses real-time PCR to detect Chlamydia trachomatis (CT) and Neisseria gonorrhoeae (NG)  This instrument and assay have been validated by the  and performing laboratory and verified by the performing laboratory  This test is intended as an aid in the diagnosis of chlamydial and gonococcal disease  This test has not been evaluated in patients younger than 15years of age and is not recommended for evaluation of suspected sexual abuse  This assay is not intended to replace other exams or tests for diagnosis of urogenital infection by causative factors other than Chalmydia trachomatis (CT) and Neisseria gonorrhoeae (NG)  Additional testing is recommended when the results do not correlate with clinical signs and symptoms  Procedural Limitations  This assay has only been validated for use with male and female urine, clinician-instructed self-collected vaginal swab specimens, clinician-collected vaginal swab specimens, endocervical swab specimens collected in carol® PCR Media and cervical specimens collected in PreservCyt® Solution  Assay performance has not been validated for use with other collection media and/or specimen types  Detection of C  trachomatis and Catherene Lei is dependent on the number of organisms present in the specimen   Detection may be affected by specimen collection methods, patient factors, stage of infection, infecting strains, and presence of polymerase/PCR inhibitors  When CT is present at very high concentrations, the detection of NG present at concentrations near the limit of detection may be impacted  The presence of mucus in endocervical specimens may lead to false negative results  The presence of whole blood in urine and cervical specimens collected in PreservCyt Solution may lead to false negative and/or invalid test results  Urine testing is recommended to be performed on first catch urine samples  The effects of other collection variables have not been evaluated at this time  The effects of vaginal discharge, tampon use, douching, and other collection variables have not been evaluated at this time  This assay has not been evaluated with patients currently being treated with antimicrobial agents active against CT or NG, or patients with a history of hysterectomy       Urine Microscopic [[id number]]  (Abnormal) Collected: 01/29/23 1400    Lab Status: Final result Specimen: Urine, Clean Catch Updated: 01/29/23 1427     RBC, UA 2-4 /hpf      WBC, UA 4-10 /hpf      Epithelial Cells Occasional /hpf      Bacteria, UA Occasional /hpf      MUCUS THREADS Innumerable    UA w Reflex to Microscopic w Reflex to Culture [[id number]]  (Abnormal) Collected: 01/29/23 1400    Lab Status: Final result Specimen: Urine, Clean Catch Updated: 01/29/23 1422     Color, UA Yellow     Clarity, UA Turbid     Specific Gravity, UA 1 029     pH, UA 5 5     Leukocytes, UA Moderate     Nitrite, UA Negative     Protein, UA Trace mg/dl      Glucose, UA Negative mg/dl      Ketones, UA Trace mg/dl      Urobilinogen, UA <2 0 mg/dl      Bilirubin, UA Negative     Occult Blood, UA Negative                 No orders to display              Procedures  Procedures         ED Course                               SBIRT 20yo+    Flowsheet Row Most Recent Value   SBIRT (23 yo +)    In order to provide better care to our patients, we are screening all of our patients for alcohol and drug use  Would it be okay to ask you these screening questions? Yes Filed at: 01/29/2023 1318   Initial Alcohol Screen: US AUDIT-C     1  How often do you have a drink containing alcohol? 0 Filed at: 01/29/2023 1318   2  How many drinks containing alcohol do you have on a typical day you are drinking? 0 Filed at: 01/29/2023 1318   3b  FEMALE Any Age, or MALE 65+: How often do you have 4 or more drinks on one occassion? 0 Filed at: 01/29/2023 1318   Audit-C Score 0 Filed at: 01/29/2023 1318   GALE: How many times in the past year have you    Used an illegal drug or used a prescription medication for non-medical reasons? Never Filed at: 01/29/2023 1318                    Medical Decision Making  Candidiasis: complicated acute illness or injury  Amount and/or Complexity of Data Reviewed  Labs: ordered  Risk  Prescription drug management  Disposition  Final diagnoses:   Candidiasis     Time reflects when diagnosis was documented in both MDM as applicable and the Disposition within this note     Time User Action Codes Description Comment    1/29/2023  1:46 PM Darrion Redding Add [B37 9] Candidiasis       ED Disposition     ED Disposition   Discharge    Condition   Stable    Date/Time   Sun Jan 29, 2023  1:45 PM    Comment   Daniel Jane discharge to home/self care                 Follow-up Information     Follow up With Specialties Details Simone [address], [address]   [address] [place] [address]  [phone]            Discharge Medication List as of 1/29/2023  1:53 PM      START taking these medications    Details   clindamycin (CLEOCIN) 300 MG capsule Take 1 capsule (300 mg total) by mouth 4 (four) times a day for 10 days, Starting Sun 1/29/2023, Until Wed 2/8/2023, Normal      fluconazole (DIFLUCAN) 200 mg tablet Take 1 tablet (200 mg total) by mouth daily for 7 days, Starting Sun 1/29/2023, Until Sun 2/5/2023, Normal CONTINUE these medications which have NOT CHANGED    Details   acetaminophen (TYLENOL) 650 mg CR tablet Take 1 tablet (650 mg total) by mouth every 8 (eight) hours as needed for mild pain, Starting Wed 11/9/2022, Normal      albuterol (Proventil HFA) 90 mcg/act inhaler Inhale 2 puffs every 6 (six) hours as needed for wheezing or shortness of breath, Starting Thu 12/8/2022, Normal      butalbital-acetaminophen-caffeine-codeine (FIORICET WITH CODEINE) -80-30 MG per capsule Take 1 capsule by mouth every 4 (four) hours as needed for headaches, Historical Med      ibuprofen (MOTRIN) 600 mg tablet Take 1 tablet (600 mg total) by mouth every 6 (six) hours as needed for mild pain, Starting Wed 11/9/2022, Normal      Lidocaine Viscous HCl (XYLOCAINE) 2 % mucosal solution Swish and spit 15 mL 4 (four) times a day as needed for mouth pain or discomfort, Starting Sat 12/3/2022, Normal      melatonin 1 mg Take 1 mg by mouth daily at bedtime, Historical Med      pantoprazole (PROTONIX) 40 mg tablet Take 1 tablet (40 mg total) by mouth daily, Starting Tue 1/11/2022, Until Wed 11/2/2022, Normal             No discharge procedures on file      PDMP Review     None          ED Provider  Electronically Signed by           Rubio Valdez DO  01/31/23 2133       Rubio Valdez DO  01/31/23 2133

## 2023-07-05 ENCOUNTER — HOSPITAL ENCOUNTER (EMERGENCY)
Facility: HOSPITAL | Age: 28
Discharge: LEFT BEFORE DISCHARGE | End: 2023-07-05
Attending: EMERGENCY MEDICINE
Payer: COMMERCIAL

## 2023-07-05 ENCOUNTER — APPOINTMENT (EMERGENCY)
Dept: CT IMAGING | Facility: HOSPITAL | Age: 28
End: 2023-07-05
Payer: COMMERCIAL

## 2023-07-05 VITALS
DIASTOLIC BLOOD PRESSURE: 68 MMHG | OXYGEN SATURATION: 98 % | SYSTOLIC BLOOD PRESSURE: 104 MMHG | TEMPERATURE: 97.4 F | HEART RATE: 96 BPM | RESPIRATION RATE: 18 BRPM

## 2023-07-05 DIAGNOSIS — R10.9 ABDOMINAL PAIN: Primary | ICD-10-CM

## 2023-07-05 LAB
ALBUMIN SERPL BCP-MCNC: 4.4 G/DL (ref 3.5–5)
ALP SERPL-CCNC: 63 U/L (ref 34–104)
ALT SERPL W P-5'-P-CCNC: 9 U/L (ref 7–52)
ANION GAP SERPL CALCULATED.3IONS-SCNC: 6 MMOL/L
AST SERPL W P-5'-P-CCNC: 11 U/L (ref 13–39)
BASOPHILS # BLD AUTO: 0.03 THOUSANDS/ÂΜL (ref 0–0.1)
BASOPHILS NFR BLD AUTO: 1 % (ref 0–1)
BILIRUB SERPL-MCNC: 0.27 MG/DL (ref 0.2–1)
BILIRUB UR QL STRIP: NEGATIVE
BUN SERPL-MCNC: 12 MG/DL (ref 5–25)
CALCIUM SERPL-MCNC: 9.1 MG/DL (ref 8.4–10.2)
CHLORIDE SERPL-SCNC: 107 MMOL/L (ref 96–108)
CLARITY UR: CLEAR
CO2 SERPL-SCNC: 25 MMOL/L (ref 21–32)
COLOR UR: YELLOW
CREAT SERPL-MCNC: 0.68 MG/DL (ref 0.6–1.3)
EOSINOPHIL # BLD AUTO: 0.26 THOUSAND/ÂΜL (ref 0–0.61)
EOSINOPHIL NFR BLD AUTO: 4 % (ref 0–6)
ERYTHROCYTE [DISTWIDTH] IN BLOOD BY AUTOMATED COUNT: 11.9 % (ref 11.6–15.1)
GFR SERPL CREATININE-BSD FRML MDRD: 119 ML/MIN/1.73SQ M
GLUCOSE SERPL-MCNC: 85 MG/DL (ref 65–140)
GLUCOSE UR STRIP-MCNC: NEGATIVE MG/DL
HCT VFR BLD AUTO: 41.9 % (ref 34.8–46.1)
HGB BLD-MCNC: 14.1 G/DL (ref 11.5–15.4)
HGB UR QL STRIP.AUTO: NEGATIVE
IMM GRANULOCYTES # BLD AUTO: 0.02 THOUSAND/UL (ref 0–0.2)
IMM GRANULOCYTES NFR BLD AUTO: 0 % (ref 0–2)
KETONES UR STRIP-MCNC: NEGATIVE MG/DL
LEUKOCYTE ESTERASE UR QL STRIP: NEGATIVE
LIPASE SERPL-CCNC: 23 U/L (ref 11–82)
LYMPHOCYTES # BLD AUTO: 1.65 THOUSANDS/ÂΜL (ref 0.6–4.47)
LYMPHOCYTES NFR BLD AUTO: 27 % (ref 14–44)
MCH RBC QN AUTO: 30.3 PG (ref 26.8–34.3)
MCHC RBC AUTO-ENTMCNC: 33.7 G/DL (ref 31.4–37.4)
MCV RBC AUTO: 90 FL (ref 82–98)
MONOCYTES # BLD AUTO: 0.42 THOUSAND/ÂΜL (ref 0.17–1.22)
MONOCYTES NFR BLD AUTO: 7 % (ref 4–12)
NEUTROPHILS # BLD AUTO: 3.83 THOUSANDS/ÂΜL (ref 1.85–7.62)
NEUTS SEG NFR BLD AUTO: 61 % (ref 43–75)
NITRITE UR QL STRIP: NEGATIVE
NRBC BLD AUTO-RTO: 0 /100 WBCS
PH UR STRIP.AUTO: 5.5 [PH]
PLATELET # BLD AUTO: 265 THOUSANDS/UL (ref 149–390)
PMV BLD AUTO: 9.8 FL (ref 8.9–12.7)
POTASSIUM SERPL-SCNC: 3.8 MMOL/L (ref 3.5–5.3)
PROT SERPL-MCNC: 7.4 G/DL (ref 6.4–8.4)
PROT UR STRIP-MCNC: NEGATIVE MG/DL
RBC # BLD AUTO: 4.65 MILLION/UL (ref 3.81–5.12)
SODIUM SERPL-SCNC: 138 MMOL/L (ref 135–147)
SP GR UR STRIP.AUTO: 1.04 (ref 1–1.03)
UROBILINOGEN UR STRIP-ACNC: <2 MG/DL
WBC # BLD AUTO: 6.21 THOUSAND/UL (ref 4.31–10.16)

## 2023-07-05 PROCEDURE — 85025 COMPLETE CBC W/AUTO DIFF WBC: CPT

## 2023-07-05 PROCEDURE — 80053 COMPREHEN METABOLIC PANEL: CPT

## 2023-07-05 PROCEDURE — 36415 COLL VENOUS BLD VENIPUNCTURE: CPT

## 2023-07-05 PROCEDURE — 81003 URINALYSIS AUTO W/O SCOPE: CPT

## 2023-07-05 PROCEDURE — 74177 CT ABD & PELVIS W/CONTRAST: CPT

## 2023-07-05 PROCEDURE — 83690 ASSAY OF LIPASE: CPT

## 2023-07-05 RX ORDER — FAMOTIDINE 20 MG/1
20 TABLET, FILM COATED ORAL ONCE
Status: COMPLETED | OUTPATIENT
Start: 2023-07-05 | End: 2023-07-05

## 2023-07-05 RX ORDER — MAGNESIUM HYDROXIDE/ALUMINUM HYDROXICE/SIMETHICONE 120; 1200; 1200 MG/30ML; MG/30ML; MG/30ML
30 SUSPENSION ORAL ONCE
Status: COMPLETED | OUTPATIENT
Start: 2023-07-05 | End: 2023-07-05

## 2023-07-05 RX ADMIN — ALUMINUM HYDROXIDE, MAGNESIUM HYDROXIDE, AND DIMETHICONE 30 ML: 200; 20; 200 SUSPENSION ORAL at 15:50

## 2023-07-05 RX ADMIN — IOHEXOL 100 ML: 350 INJECTION, SOLUTION INTRAVENOUS at 16:44

## 2023-07-05 RX ADMIN — FAMOTIDINE 20 MG: 20 TABLET, FILM COATED ORAL at 15:50

## 2023-07-05 NOTE — ED PROVIDER NOTES
History  Chief Complaint   Patient presents with   • Abdominal Pain     Pt presents with concern that she has a stomach ulcer, states she has a sharp pain in the mid upper abdomen, pain is intermittent with nausea. Denies V/D. Patient is a 34-year-old female with a past medical history of anxiety, asthma, GERD, IBS and migraines presenting to the emergency department for evaluation of epigastric abdominal pain. Patient reports this morning she woke up with a burning sensation in her stomach. Patient reports the pain is intermittent. Patient reports pain is slightly alleviated after eating. Patient when she feels as though her stomach is bloated. Patient denies any urinary symptoms. Denies fevers, chills, rash, headache, weakness, dizziness, visual changes, vomiting, diarrhea, constipation, chest pain, shortness of breath or difficulty breathing. Does not offer any other concerns or complaints. Prior to Admission Medications   Prescriptions Last Dose Informant Patient Reported? Taking?    Lidocaine Viscous HCl (XYLOCAINE) 2 % mucosal solution   No No   Sig: Swish and spit 15 mL 4 (four) times a day as needed for mouth pain or discomfort   acetaminophen (TYLENOL) 650 mg CR tablet   No No   Sig: Take 1 tablet (650 mg total) by mouth every 8 (eight) hours as needed for mild pain   albuterol (Proventil HFA) 90 mcg/act inhaler   No No   Sig: Inhale 2 puffs every 6 (six) hours as needed for wheezing or shortness of breath   butalbital-acetaminophen-caffeine-codeine (FIORICET WITH CODEINE) -99-30 MG per capsule   Yes No   Sig: Take 1 capsule by mouth every 4 (four) hours as needed for headaches   ibuprofen (MOTRIN) 600 mg tablet   No No   Sig: Take 1 tablet (600 mg total) by mouth every 6 (six) hours as needed for mild pain   melatonin 1 mg   Yes No   Sig: Take 1 mg by mouth daily at bedtime   pantoprazole (PROTONIX) 40 mg tablet   No No   Sig: Take 1 tablet (40 mg total) by mouth daily Facility-Administered Medications: None       Past Medical History:   Diagnosis Date   • Anxiety    • Asthma     cold weather, exercise induced   • GERD (gastroesophageal reflux disease)    • Irritable bowel syndrome    • Migraines        Past Surgical History:   Procedure Laterality Date   • CHROMOSOME ANALYSIS, PRODUCTS OF CONCEPTION (HISTORICAL)     • DILATION AND CURETTAGE OF UTERUS      d&E,    • HYSTERECTOMY N/A    • DE COLONOSCOPY FLX DX W/COLLJ SPEC WHEN PFRMD N/A 10/31/2018    Procedure: EGD AND COLONOSCOPY;  Surgeon: Aldo Viramontes MD;  Location: [facility]; Service: Gastroenterology   • DE DILATION & CURETTAGE DX&/THER NONOBSTETRIC N/A 2022    Procedure: D&C Suction;  Surgeon: Estefanía Martin MD;  [facility]: [facility];  Service: Gynecology   • DE VAG HYST 250 GM/< W/RMVL TUBE&/OVARY Bilateral 2022    Procedure: TOTAL VAGINAL HYSTERECTOMY BILATERAL SALPINGECTOMY RIGHT OOPHORECTOMY;  Surgeon: Dustin Thompson MD;  Location:  MAIN OR;  Service: Gynecology   • SOFT TISSUE MASS EXCISION     • TONSILECTOMY, ADENOIDECTOMY, BILATERAL MYRINGOTOMY AND TUBES     • WISDOM TOOTH EXTRACTION         Family History   Problem Relation Age of Onset   • Diabetes Mother    • Hypertension Father    • Heart disease Father    • Stroke Father      I have reviewed and agree with the history as documented. E-Cigarette/Vaping   • E-Cigarette Use Former User      E-Cigarette/Vaping Substances   • Nicotine No    • THC No    • CBD No    • Flavoring No    • Other No    • Unknown No      Social History     Tobacco Use   • Smoking status: Every Day     Packs/day: 0.25     Types: Cigarettes     Last attempt to quit: 2022     Years since quittin.8   • Smokeless tobacco: Never   Vaping Use   • Vaping Use: Former   Substance Use Topics   • Alcohol use: No     Comment: rarely   • Drug use: No       Review of Systems   Constitutional: Negative for chills and fever.    HENT: Negative for ear pain and sore throat. Eyes: Negative for pain and visual disturbance. Respiratory: Negative for cough and shortness of breath. Cardiovascular: Negative for chest pain and palpitations. Gastrointestinal: Positive for abdominal distention, abdominal pain and nausea. Negative for constipation, diarrhea and vomiting. Genitourinary: Negative for dysuria and hematuria. Musculoskeletal: Negative for arthralgias and back pain. Skin: Negative for color change and rash. Neurological: Negative for seizures and syncope. All other systems reviewed and are negative. Physical Exam  Physical Exam  Vitals and nursing note reviewed. Constitutional:       General: She is not in acute distress. Appearance: Normal appearance. She is well-developed. She is not ill-appearing, toxic-appearing or diaphoretic. HENT:      Head: Normocephalic and atraumatic. Right Ear: External ear normal.      Left Ear: External ear normal.      Nose: Nose normal.      Mouth/Throat:      Mouth: Mucous membranes are moist.   Eyes:      General: No scleral icterus. Right eye: No discharge. Left eye: No discharge. Conjunctiva/sclera: Conjunctivae normal.   Cardiovascular:      Rate and Rhythm: Normal rate and regular rhythm. Heart sounds: No murmur heard. Pulmonary:      Effort: Pulmonary effort is normal. No respiratory distress. Breath sounds: Normal breath sounds. Abdominal:      Palpations: Abdomen is soft. Tenderness: There is abdominal tenderness in the right lower quadrant and epigastric area. Musculoskeletal:         General: No swelling, deformity or signs of injury. Normal range of motion. Cervical back: Normal range of motion and neck supple. No rigidity. Skin:     General: Skin is warm and dry. Capillary Refill: Capillary refill takes less than 2 seconds. Coloration: Skin is not jaundiced. Findings: No erythema or rash.    Neurological:      General: No focal deficit present. Mental Status: She is alert and oriented to person, place, and time. Mental status is at baseline. Cranial Nerves: No cranial nerve deficit. Gait: Gait normal.   Psychiatric:         Mood and Affect: Mood normal.         Behavior: Behavior normal.         Thought Content:  Thought content normal.         Judgment: Judgment normal.         Vital Signs  ED Triage Vitals [07/05/23 1413]   Temperature Pulse Respirations Blood Pressure SpO2   (!) 97.4 °F (36.3 °C) 96 18 104/68 98 %      Temp Source Heart Rate Source Patient Position - Orthostatic VS BP Location FiO2 (%)   Temporal Monitor Sitting Left arm --      Pain Score       --           Vitals:    07/05/23 1413   BP: 104/68   Pulse: 96   Patient Position - Orthostatic VS: Sitting         Visual Acuity      ED Medications  Medications   famotidine (PEPCID) tablet 20 mg (20 mg Oral Given 7/5/23 1550)   aluminum-magnesium hydroxide-simethicone (MYLANTA) oral suspension 30 mL (30 mL Oral Given 7/5/23 1550)   iohexol (OMNIPAQUE) 350 MG/ML injection (SINGLE-DOSE) 100 mL (100 mL Intravenous Given 7/5/23 1644)       Diagnostic Studies  Results Reviewed     Procedure Component Value Units Date/Time    Comprehensive metabolic panel [[id number]]  (Abnormal) Collected: 07/05/23 1603    Lab Status: Final result Specimen: Blood from Arm, Right Updated: 07/05/23 1633     Sodium 138 mmol/L      Potassium 3.8 mmol/L      Chloride 107 mmol/L      CO2 25 mmol/L      ANION GAP 6 mmol/L      BUN 12 mg/dL      Creatinine 0.68 mg/dL      Glucose 85 mg/dL      Calcium 9.1 mg/dL      AST 11 U/L      ALT 9 U/L      Alkaline Phosphatase 63 U/L      Total Protein 7.4 g/dL      Albumin 4.4 g/dL      Total Bilirubin 0.27 mg/dL      eGFR 119 ml/min/1.73sq m     Narrative:      [place] guidelines for Chronic Kidney Disease (CKD):   •  Stage 1 with normal or high GFR (GFR > 90 mL/min/1.73 square meters)  •  Stage 2 Mild CKD (GFR = 60-89 mL/min/1.73 square meters)  •  Stage 3A Moderate CKD (GFR = 45-59 mL/min/1.73 square meters)  •  Stage 3B Moderate CKD (GFR = 30-44 mL/min/1.73 square meters)  •  Stage 4 Severe CKD (GFR = 15-29 mL/min/1.73 square meters)  •  Stage 5 End Stage CKD (GFR <15 mL/min/1.73 square meters)  Note: GFR calculation is accurate only with a steady state creatinine    Lipase [[id number]]  (Normal) Collected: 07/05/23 1603    Lab Status: Final result Specimen: Blood from Arm, Right Updated: 07/05/23 1633     Lipase 23 u/L     CBC and differential [[id number]] Collected: 07/05/23 1603    Lab Status: Final result Specimen: Blood from Arm, Right Updated: 07/05/23 1607     WBC 6.21 Thousand/uL      RBC 4.65 Million/uL      Hemoglobin 14.1 g/dL      Hematocrit 41.9 %      MCV 90 fL      MCH 30.3 pg      MCHC 33.7 g/dL      RDW 11.9 %      MPV 9.8 fL      Platelets 025 Thousands/uL      nRBC 0 /100 WBCs      Neutrophils Relative 61 %      Immat GRANS % 0 %      Lymphocytes Relative 27 %      Monocytes Relative 7 %      Eosinophils Relative 4 %      Basophils Relative 1 %      Neutrophils Absolute 3.83 Thousands/µL      Immature Grans Absolute 0.02 Thousand/uL      Lymphocytes Absolute 1.65 Thousands/µL      Monocytes Absolute 0.42 Thousand/µL      Eosinophils Absolute 0.26 Thousand/µL      Basophils Absolute 0.03 Thousands/µL     UA w Reflex to Microscopic w Reflex to Culture [[id number]]  (Abnormal) Collected: 07/05/23 1529    Lab Status: Final result Specimen: Urine, Clean Catch Updated: 07/05/23 1541     Color, UA Yellow     Clarity, UA Clear     Specific Gravity, UA 1.040     pH, UA 5.5     Leukocytes, UA Negative     Nitrite, UA Negative     Protein, UA Negative mg/dl      Glucose, UA Negative mg/dl      Ketones, UA Negative mg/dl      Urobilinogen, UA <2.0 mg/dl      Bilirubin, UA Negative     Occult Blood, UA Negative                 CT abdomen pelvis with contrast    (Results Pending)              Procedures  Procedures         ED Course                     Medical Decision Making    This is a 26-year-old female presented to the emergency room for evaluation of abdominal pain. Patient reports this morning she woke up with epigastric burning. Patient reports she did take Tylenol which seemed to slightly alleviate the discomfort. Patient reports when she eats that seems to help the pain. Patient reports having slight nausea but reports it is alleviated on presentation. Patient is in no acute distress, stable vital signs on initial examination. Differential diagnosis to include but is not limited to: Gastric ulcer, gastritis, UTI, diverticulitis, appendicitis    Initial ED Plan: CBC, CMP, lipase, UA, CT abdomen pelvis    ED results: Patient is requesting discharge prior to [id number] [address] official reading. Discussed with the patient that the study is currently being interpreted by radiology but patient states she needs to leave because she is hungry. Discussed leaving AMA with the patient, form signed by patient.   -patient reports she has a GI appointment tomorrow. Final ED assessment: Patient is stable and well appearing. Patient left AMA. Amount and/or Complexity of Data Reviewed  Labs: ordered. Radiology: ordered. Risk  OTC drugs. Prescription drug management. Disposition  Final diagnoses:   Abdominal pain     Time reflects when diagnosis was documented in both MDM as applicable and the Disposition within this note     Time User Action Codes Description Comment    7/5/2023  5:50 PM Desi Wilson Add [R10.9] Abdominal pain       ED Disposition     ED Disposition   AMA    Condition   --    Date/Time   Wed Jul 5, 2023  5:52 PM    Comment   Date: 7/5/2023  Patient: Marianne Becerril  Admitted: 7/5/2023  2:46 PM  Attending Provider: Corey Templeton MD    Marianne Becerril or her authorized caregiver has made the decision for the patient to leave the emergency department against the advi ce of the emergency department staff. She or her authorized caregiver has been informed and understands the inherent risks, including death, worsening condition, permanent disability, worsening pain. She or her authorized caregiver has decided to ac cept the responsibility for this decision. [address] and all necessary parties have been advised that she may return for further evaluation or treatment. Her condition at time of discharge was stable. Erick Lockwood had current vital si gns as follows:  /68 (BP Location: Left arm)   Pulse 96   Temp (!) 97.4 °F (36.3 °C) (Temporal)   Resp 18   LMP 08/01/2022 (Exact Date)            Follow-up Information     Follow up With Specialties Details Why Contact Info Additional Information    Bandar Rodriguez MD Family Medicine Call in 3 days For follow up [address] [facility], [address] [place] [address] [address] Emergency Department Emergency Medicine Go to  If symptoms worsen [address] [facility], [place], [place], [address]          Patient's Medications   Discharge Prescriptions    No medications on file       No discharge procedures on file.     PDMP Review     None          ED Provider  Electronically Signed by           Aimee Pena PA-C  07/05/23 1606 MARVIN Guzman PA-C  07/05/23 5500

## 2023-07-05 NOTE — DISCHARGE INSTRUCTIONS
Follow up with PCP  Follow up with GI for appointment tomorrow  Return to the ED with new or worsening symptoms including but not limited to worsening pain, fevers, decreased oral intake

## 2023-07-06 ENCOUNTER — OFFICE VISIT (OUTPATIENT)
Dept: GASTROENTEROLOGY | Facility: CLINIC | Age: 28
End: 2023-07-06
Payer: COMMERCIAL

## 2023-07-06 ENCOUNTER — HOSPITAL ENCOUNTER (OUTPATIENT)
Dept: ULTRASOUND IMAGING | Facility: HOSPITAL | Age: 28
End: 2023-07-06
Payer: COMMERCIAL

## 2023-07-06 VITALS
HEIGHT: 64 IN | BODY MASS INDEX: 25.81 KG/M2 | SYSTOLIC BLOOD PRESSURE: 102 MMHG | HEART RATE: 118 BPM | WEIGHT: 151.2 LBS | DIASTOLIC BLOOD PRESSURE: 68 MMHG | OXYGEN SATURATION: 98 %

## 2023-07-06 DIAGNOSIS — R11.0 NAUSEA: ICD-10-CM

## 2023-07-06 DIAGNOSIS — R10.13 EPIGASTRIC PAIN: Primary | ICD-10-CM

## 2023-07-06 DIAGNOSIS — R10.13 EPIGASTRIC PAIN: ICD-10-CM

## 2023-07-06 PROCEDURE — 99213 OFFICE O/P EST LOW 20 MIN: CPT | Performed by: PHYSICIAN ASSISTANT

## 2023-07-06 PROCEDURE — 76705 ECHO EXAM OF ABDOMEN: CPT

## 2023-07-06 RX ORDER — SUCRALFATE 1 G/1
1 TABLET ORAL 4 TIMES DAILY
Qty: 60 TABLET | Refills: 0 | Status: SHIPPED | OUTPATIENT
Start: 2023-07-06

## 2023-07-06 NOTE — PATIENT INSTRUCTIONS
Scheduled date of EGD(as of today):7/14/23  Physician performing EGD:Jo-Ann  Location of EGD:[place]  Instructions reviewed with patient by:Floyd harris  Clearances:  none

## 2023-07-06 NOTE — PROGRESS NOTES
[facility] - Outpatient Follow-up Note  Petty Virk 28 y.o. female MRN: [id number]  Encounter: [id number]          ASSESSMENT AND PLAN:      1. Epigastric pain  2. Nausea    Started 2 days ago  Evaluated in the ER yesterday with a CT showing a stone filled contracted gallbladder  Liver enzymes are normal  She has Pantoprazole but only uses this as needed for heartburn  She is under a great deal of stress with various family health issues  She has a history of a stomach ulcer and is understandably concerned about this  She denies any NSAIDs  She denies ETOH    Will check a STAT US  Will plan urgent EGD    Start Pantoprazole daily    ______________________________________________________________________    SUBJECTIVE:  28year old female with a history of peptic ulcer disease who presents for evaluation of epigastric pain and nausea. She reports that the symptoms started acutely 2 days ago and has been persistent. She reports that she went to the emergency room yesterday. A CT was performed which showed a contracted stone filled gallbladder. She notes that she has known that she had gallstones since the fall of last year when she was in the emergency room with complications from her hysterectomy. She admits that she had similar epigastric pain and nausea symptoms several weeks ago. The symptoms lasted for 4 days before spontaneously resolving. She reports that she went to the emergency room this time since the symptom was recurrent. She has had no hematemesis or melena. She does admit that her pain is very reminiscent of when she was diagnosed with a stomach ulcer in high school. She notes that she had this ulcer secondary to taking a significant amount of Aleve. She has not been taking any NSAIDs and does not drink alcohol. She admits that she is under a great deal of stress at home given illnesses with both her son and her .   She denies that there is anything acutely stressful that occurred in the past 2 days. REVIEW OF SYSTEMS IS OTHERWISE NEGATIVE. Historical Information   Past Medical History:   Diagnosis Date   • Anxiety    • Asthma     cold weather, exercise induced   • GERD (gastroesophageal reflux disease)    • Irritable bowel syndrome    • Migraines      Past Surgical History:   Procedure Laterality Date   • CHROMOSOME ANALYSIS, PRODUCTS OF CONCEPTION (HISTORICAL)     • DILATION AND CURETTAGE OF UTERUS      d&E,    • HYSTERECTOMY N/A    • NM COLONOSCOPY FLX DX W/COLLJ SPEC WHEN PFRMD N/A 10/31/2018    Procedure: EGD AND COLONOSCOPY;  Surgeon: Izabella Joe MD;  Location[facility];   Service: Gastroenterology   • NM DILATION & CURETTAGE DX&/THER NONOBSTETRIC N/A 2022    Procedure: D&C Suction;  Surgeon: Mykel Pratt MD;  [facility]: [facility];  Service: Gynecology   • NM VAG HYST 250 GM/< W/RMVL TUBE&/OVARY Bilateral 2022    Procedure: TOTAL VAGINAL HYSTERECTOMY BILATERAL SALPINGECTOMY RIGHT OOPHORECTOMY;  Surgeon: Luwanna Duane, MD;  Location:  MAIN OR;  Service: Gynecology   • SOFT TISSUE MASS EXCISION     • TONSILECTOMY, ADENOIDECTOMY, BILATERAL MYRINGOTOMY AND TUBES     • WISDOM TOOTH EXTRACTION       Social History   Social History     Substance and Sexual Activity   Alcohol Use No    Comment: rarely     Social History     Substance and Sexual Activity   Drug Use No     Social History     Tobacco Use   Smoking Status Every Day   • Packs/day: 0.25   • Types: Cigarettes   • Last attempt to quit: 2022   • Years since quittin.8   Smokeless Tobacco Never     Family History   Problem Relation Age of Onset   • Diabetes Mother    • Hypertension Father    • Heart disease Father    • Stroke Father        Meds/Allergies       Current Outpatient Medications:   •  acetaminophen (TYLENOL) 650 mg CR tablet  •  albuterol (Proventil HFA) 90 mcg/act inhaler  •  butalbital-acetaminophen-caffeine-codeine (FIORICET WITH CODEINE) -71-30 MG per capsule  •  ibuprofen (MOTRIN) 600 mg tablet  •  melatonin 1 mg  •  pantoprazole (PROTONIX) 40 mg tablet  •  sucralfate (CARAFATE) 1 g tablet  No current facility-administered medications for this visit. Allergies   Allergen Reactions   • Imitrex [Sumatriptan] Anaphylaxis   • Penicillins Anaphylaxis   • Codeine GI Intolerance   • Doxycycline GI Intolerance   • Zithromax [Azithromycin] GI Intolerance   • Amitriptyline Palpitations           Objective     Blood pressure 102/68, pulse (!) 118, height 5' 4" (1.626 m), weight 68.6 kg (151 lb 3.2 oz), last menstrual period 08/01/2022, SpO2 98 %, not currently breastfeeding. Body mass index is 25.95 kg/m². PHYSICAL EXAM:      General Appearance:   Alert, cooperative, no distress   HEENT:   Normocephalic, atraumatic, anicteric.     Neck:  Supple, symmetrical, trachea midline   Lungs:   Clear to auscultation bilaterally; no rales, rhonchi or wheezing; respirations unlabored    Heart[de-identified]   Regular rate and rhythm; no murmur, rub, or gallop. Abdomen:   Soft, non-tender, non-distended; normal bowel sounds; no masses, no organomegaly    Genitalia:   Deferred    Rectal:   Deferred    Extremities:  No cyanosis, clubbing or edema    Pulses:  2+ and symmetric    Skin:  No jaundice, rashes, or lesions    Lymph nodes:  No palpable cervical lymphadenopathy        Lab Results:   No visits with results within 1 Day(s) from this visit.    Latest known visit with results is:   Admission on 07/05/2023, Discharged on 07/05/2023   Component Date Value   • WBC 07/05/2023 6.21    • RBC 07/05/2023 4.65    • Hemoglobin 07/05/2023 14.1    • Hematocrit 07/05/2023 41.9    • MCV 07/05/2023 90    • MCH 07/05/2023 30.3    • MCHC 07/05/2023 33.7    • RDW 07/05/2023 11.9    • MPV 07/05/2023 9.8    • Platelets 87/46/2295 265    • nRBC 07/05/2023 0    • Neutrophils Relative 07/05/2023 61    • Immat GRANS % 07/05/2023 0    • Lymphocytes Relative 07/05/2023 27    • Monocytes Relative 07/05/2023 7    • Eosinophils Relative 07/05/2023 4    • Basophils Relative 07/05/2023 1    • Neutrophils Absolute 07/05/2023 3.83    • Immature Grans Absolute 07/05/2023 0.02    • Lymphocytes Absolute 07/05/2023 1.65    • Monocytes Absolute 07/05/2023 0.42    • Eosinophils Absolute 07/05/2023 0.26    • Basophils Absolute 07/05/2023 0.03    • Sodium 07/05/2023 138    • Potassium 07/05/2023 3.8    • Chloride 07/05/2023 107    • CO2 07/05/2023 25    • ANION GAP 07/05/2023 6    • BUN 07/05/2023 12    • Creatinine 07/05/2023 0.68    • Glucose 07/05/2023 85    • Calcium 07/05/2023 9.1    • AST 07/05/2023 11 (L)    • ALT 07/05/2023 9    • Alkaline Phosphatase 07/05/2023 63    • Total Protein 07/05/2023 7.4    • Albumin 07/05/2023 4.4    • Total Bilirubin 07/05/2023 0.27    • eGFR 07/05/2023 119    • Lipase 07/05/2023 23    • Color, UA 07/05/2023 Yellow    • Clarity, UA 07/05/2023 Clear    • Specific Gravity, UA 07/05/2023 1.040 (H)    • pH, UA 07/05/2023 5.5    • Leukocytes, UA 07/05/2023 Negative    • Nitrite, UA 07/05/2023 Negative    • Protein, UA 07/05/2023 Negative    • Glucose, UA 07/05/2023 Negative    • Ketones, UA 07/05/2023 Negative    • Urobilinogen, UA 07/05/2023 <2.0    • Bilirubin, UA 07/05/2023 Negative    • Occult Blood, UA 07/05/2023 Negative          Radiology Results:   CT abdomen pelvis with contrast    Result Date: 7/5/2023  Narrative: CT ABDOMEN AND PELVIS WITH IV CONTRAST INDICATION:   Epigastric pain, bloating. COMPARISON: 11/12/2022 TECHNIQUE:  CT examination of the abdomen and pelvis was performed. Multiplanar 2D reformatted images were created from the source data. This examination, like all CT scans performed in the [facility], was performed utilizing techniques to minimize radiation dose exposure, including the use of iterative reconstruction and automated exposure control.  Radiation dose length product (DLP) for this visit:  652 mGy-cm IV Contrast:  100 mL of iohexol (OMNIPAQUE) Enteric Contrast:  Enteric contrast was not administered. FINDINGS: ABDOMEN LOWER CHEST: Clear lung bases. LIVER/BILIARY TREE:  Unremarkable. GALLBLADDER: Partially collapsed, stone filled gallbladder. No evidence of acute cholecystitis. SPLEEN:  Unremarkable. PANCREAS:  Unremarkable. ADRENAL GLANDS:  Unremarkable. KIDNEYS/URETERS: Stable right renal 1 cm cyst. Symmetric nephrographic phase enhancement of the kidneys. No obstructive uropathy STOMACH AND BOWEL: Fluid-filled loops of small bowel with mild mucosal enhancement. No evidence of bowel obstruction, colitis or diverticulitis. APPENDIX: Normal appendix. ABDOMINOPELVIC CAVITY:  No ascites. No pneumoperitoneum. No lymphadenopathy. VESSELS:  Unremarkable for patient's age. PELVIS REPRODUCTIVE ORGANS: Postsurgical change from hysterectomy. Follicle in the left ovary. No pathologic cyst or mass identified. Small amount of fluid in the pelvis may be physiologic. URINARY BLADDER:  Unremarkable. ABDOMINAL WALL/INGUINAL REGIONS:  Unremarkable. OSSEOUS STRUCTURES:  No acute fracture or destructive osseous lesion. Impression: 1. Findings suggestive of enteritis. No evidence of bowel obstruction, colitis, diverticulitis or appendicitis. 2. Stone filled partially collapsed gallbladder. No evidence of acute cholecystitis.  Workstation performed: [id number]

## 2023-07-07 ENCOUNTER — NURSE TRIAGE (OUTPATIENT)
Age: 28
End: 2023-07-07

## 2023-07-07 DIAGNOSIS — R10.13 EPIGASTRIC PAIN: Primary | ICD-10-CM

## 2023-07-07 RX ORDER — PANTOPRAZOLE SODIUM 40 MG/1
40 TABLET, DELAYED RELEASE ORAL DAILY
Qty: 30 TABLET | Refills: 3 | Status: SHIPPED | OUTPATIENT
Start: 2023-07-07

## 2023-07-07 RX ORDER — DICYCLOMINE HCL 20 MG
20 TABLET ORAL EVERY 6 HOURS PRN
Qty: 30 TABLET | Refills: 3 | Status: SHIPPED | OUTPATIENT
Start: 2023-07-07

## 2023-07-07 NOTE — TELEPHONE ENCOUNTER
Called and relayed results to pt. Spoke to Roddy Ponce and her symptoms were addressed. Floyd - please schedule pt with Dr. Laureen Ozuna for EGD for Monday 6/7/23 and cancel with Dr. Ira Aguila.  Thanks

## 2023-07-07 NOTE — TELEPHONE ENCOUNTER
Patient called in, she is looking for results from her [address] and recommendations. She is still having ongoing abdominal pain. Please review results for patient.

## 2023-07-10 ENCOUNTER — ANESTHESIA (OUTPATIENT)
Dept: GASTROENTEROLOGY | Facility: HOSPITAL | Age: 28
End: 2023-07-10

## 2023-07-10 ENCOUNTER — ANESTHESIA EVENT (OUTPATIENT)
Dept: GASTROENTEROLOGY | Facility: HOSPITAL | Age: 28
End: 2023-07-10

## 2023-07-10 ENCOUNTER — HOSPITAL ENCOUNTER (OUTPATIENT)
Dept: GASTROENTEROLOGY | Facility: HOSPITAL | Age: 28
Setting detail: OUTPATIENT SURGERY
Discharge: HOME | End: 2023-07-10
Admitting: INTERNAL MEDICINE
Payer: COMMERCIAL

## 2023-07-10 VITALS
SYSTOLIC BLOOD PRESSURE: 91 MMHG | RESPIRATION RATE: 18 BRPM | OXYGEN SATURATION: 100 % | WEIGHT: 150.79 LBS | BODY MASS INDEX: 25.74 KG/M2 | HEART RATE: 77 BPM | HEIGHT: 64 IN | DIASTOLIC BLOOD PRESSURE: 51 MMHG | TEMPERATURE: 98.1 F

## 2023-07-10 DIAGNOSIS — R11.0 NAUSEA: ICD-10-CM

## 2023-07-10 DIAGNOSIS — R10.13 EPIGASTRIC PAIN: ICD-10-CM

## 2023-07-10 PROCEDURE — 88313 SPECIAL STAINS GROUP 2: CPT | Performed by: PATHOLOGY

## 2023-07-10 PROCEDURE — 88341 IMHCHEM/IMCYTCHM EA ADD ANTB: CPT | Performed by: PATHOLOGY

## 2023-07-10 PROCEDURE — 43239 EGD BIOPSY SINGLE/MULTIPLE: CPT | Performed by: INTERNAL MEDICINE

## 2023-07-10 PROCEDURE — 88342 IMHCHEM/IMCYTCHM 1ST ANTB: CPT | Performed by: PATHOLOGY

## 2023-07-10 PROCEDURE — 88305 TISSUE EXAM BY PATHOLOGIST: CPT | Performed by: PATHOLOGY

## 2023-07-10 RX ORDER — LIDOCAINE HYDROCHLORIDE 20 MG/ML
INJECTION, SOLUTION EPIDURAL; INFILTRATION; INTRACAUDAL; PERINEURAL AS NEEDED
Status: DISCONTINUED | OUTPATIENT
Start: 2023-07-10 | End: 2023-07-10

## 2023-07-10 RX ORDER — SODIUM CHLORIDE, SODIUM LACTATE, POTASSIUM CHLORIDE, CALCIUM CHLORIDE 600; 310; 30; 20 MG/100ML; MG/100ML; MG/100ML; MG/100ML
INJECTION, SOLUTION INTRAVENOUS CONTINUOUS PRN
Status: DISCONTINUED | OUTPATIENT
Start: 2023-07-10 | End: 2023-07-10

## 2023-07-10 RX ORDER — GLYCOPYRROLATE 0.2 MG/ML
INJECTION INTRAMUSCULAR; INTRAVENOUS AS NEEDED
Status: DISCONTINUED | OUTPATIENT
Start: 2023-07-10 | End: 2023-07-10

## 2023-07-10 RX ORDER — PROPOFOL 10 MG/ML
INJECTION, EMULSION INTRAVENOUS AS NEEDED
Status: DISCONTINUED | OUTPATIENT
Start: 2023-07-10 | End: 2023-07-10

## 2023-07-10 RX ADMIN — PROPOFOL 50 MG: 10 INJECTION, EMULSION INTRAVENOUS at 07:58

## 2023-07-10 RX ADMIN — GLYCOPYRROLATE 0.2 MCG: 0.2 INJECTION, SOLUTION INTRAMUSCULAR; INTRAVENOUS at 07:54

## 2023-07-10 RX ADMIN — LIDOCAINE HYDROCHLORIDE 100 MG: 20 INJECTION, SOLUTION EPIDURAL; INFILTRATION; INTRACAUDAL; PERINEURAL at 07:57

## 2023-07-10 RX ADMIN — PROPOFOL 50 MG: 10 INJECTION, EMULSION INTRAVENOUS at 08:00

## 2023-07-10 RX ADMIN — SODIUM CHLORIDE, SODIUM LACTATE, POTASSIUM CHLORIDE, AND CALCIUM CHLORIDE: .6; .31; .03; .02 INJECTION, SOLUTION INTRAVENOUS at 07:10

## 2023-07-10 RX ADMIN — PROPOFOL 150 MG: 10 INJECTION, EMULSION INTRAVENOUS at 07:57

## 2023-07-10 NOTE — ANESTHESIA PREPROCEDURE EVALUATION
Medical History    History Comments   Asthma cold weather, exercise induced   Anxiety    Migraines    GERD (gastroesophageal reflux disease)    Irritable bowel syndrome      Procedure:  EGD    Relevant Problems   ANESTHESIA (within normal limits)      CARDIO   (+) Migraine      NEURO/PSYCH   (+) Migraine      PULMONARY   (+) Smoking        Physical Exam    Airway    Mallampati score: II  TM Distance: >3 FB  Neck ROM: full     Dental       Cardiovascular  Rate: normal,     Pulmonary  Pulmonary exam normal     Other Findings  Per pt denies anything remaining that is loose or removeable      Anesthesia Plan  ASA Score- 2     Anesthesia Type- IV sedation with anesthesia with ASA Monitors. Additional Monitors:   Airway Plan:     Comment: Per patient, appropriately NPO, denies active CP/SOB/wheezing/symptoms related to heartburn/nausea/vomiting. Plan Factors-Exercise tolerance (METS): >4 METS. Chart reviewed. Patient summary reviewed. Patient is a current smoker. Induction- intravenous. Postoperative Plan-     Informed Consent- Anesthetic plan and risks discussed with patient. I personally reviewed this patient with the CRNA. Discussed and agreed on the Anesthesia Plan with the CRNA. Sumit Christina

## 2023-07-10 NOTE — H&P
History and Physical -  [facility]  [address] [place] [address] 28 y.o. female MRN: [id number]      HPI: [address] [place] [address] is a 28y.o. year old female who presents for evaluation of epigastric abdominal pain and nausea      REVIEW OF SYSTEMS: Per the HPI, and otherwise unremarkable. Historical Information   Past Medical History:   Diagnosis Date   • Anxiety    • Asthma     cold weather, exercise induced   • GERD (gastroesophageal reflux disease)    • Irritable bowel syndrome    • Migraines      Past Surgical History:   Procedure Laterality Date   • CHROMOSOME ANALYSIS, PRODUCTS OF CONCEPTION (HISTORICAL)     • DILATION AND CURETTAGE OF UTERUS      d&E,    • HYSTERECTOMY N/A    • NH COLONOSCOPY FLX DX W/COLLJ SPEC WHEN PFRMD N/A 10/31/2018    Procedure: EGD AND COLONOSCOPY;  Surgeon: Nadine Vargas MD;  Location: [facility];   Service: Gastroenterology   • NH DILATION & CURETTAGE DX&/THER NONOBSTETRIC N/A 2022    Procedure: D&C Suction;  Surgeon: Billy Rebolledo MD;  Location: [facility];  Service: Gynecology   • NH VAG HYST 250 GM/< W/RMVL TUBE&/OVARY Bilateral 2022    Procedure: TOTAL VAGINAL HYSTERECTOMY BILATERAL SALPINGECTOMY RIGHT OOPHORECTOMY;  Surgeon: Kaur Farmer MD;  Location:  MAIN OR;  Service: Gynecology   • SOFT TISSUE MASS EXCISION     • TONSILECTOMY, ADENOIDECTOMY, BILATERAL MYRINGOTOMY AND TUBES     • WISDOM TOOTH EXTRACTION       Social History   Social History     Substance and Sexual Activity   Alcohol Use No    Comment: rarely     Social History     Substance and Sexual Activity   Drug Use No     Social History     Tobacco Use   Smoking Status Every Day   • Packs/day: 0.25   • Types: Cigarettes   • Last attempt to quit: 2022   • Years since quittin.8   Smokeless Tobacco Never     Family History   Problem Relation Age of Onset   • Diabetes Mother    • Hypertension Father    • Heart disease Father    • Stroke Father        Meds/Allergies (Not in a hospital admission)      Allergies   Allergen Reactions   • Imitrex [Sumatriptan] Anaphylaxis   • Penicillins Anaphylaxis   • Codeine GI Intolerance   • Doxycycline GI Intolerance   • Zithromax [Azithromycin] GI Intolerance   • Amitriptyline Palpitations       Objective     Blood pressure 112/82, pulse 85, temperature 97.6 °F (36.4 °C), temperature source Temporal, resp. rate 18, height 5' 4" (1.626 m), weight 68.4 kg (150 lb 12.7 oz), last menstrual period 08/01/2022, SpO2 96 %, not currently breastfeeding. PHYSICAL EXAM    Gen: NAD  CV: RRR  CHEST: Clear  ABD: soft, NT/ND  EXT: no edema      ASSESSMENT/PLAN:  This is a 28y.o. year old female here for EGD with biopsies, and she is stable and optimized for her procedure.

## 2023-07-12 ENCOUNTER — NURSE TRIAGE (OUTPATIENT)
Age: 28
End: 2023-07-12

## 2023-07-12 DIAGNOSIS — K80.20 GALLSTONES: Primary | ICD-10-CM

## 2023-07-12 DIAGNOSIS — R10.13 EPIGASTRIC PAIN: ICD-10-CM

## 2023-07-12 PROCEDURE — 88341 IMHCHEM/IMCYTCHM EA ADD ANTB: CPT | Performed by: PATHOLOGY

## 2023-07-12 PROCEDURE — 88313 SPECIAL STAINS GROUP 2: CPT | Performed by: PATHOLOGY

## 2023-07-12 PROCEDURE — 88305 TISSUE EXAM BY PATHOLOGIST: CPT | Performed by: PATHOLOGY

## 2023-07-12 PROCEDURE — 88342 IMHCHEM/IMCYTCHM 1ST ANTB: CPT | Performed by: PATHOLOGY

## 2023-07-12 NOTE — TELEPHONE ENCOUNTER
Sent [employer] message to patient stating that I placed the referral.  I will have surgery call her.

## 2023-07-12 NOTE — TELEPHONE ENCOUNTER
SPOKE WITH PT, S/P EGD ON 7/10/23, PT DOESN'T WANT TO WAIT FOR  PENDING PATHOLOGY SHE WOULD LIKE PROCEED WITH SURGICAL CONSULTATION FOR LAP CHOLECYSTECTOMY, PT WITH PERSISTENT, WORSENING RUQ PAIN. THANK YOU.

## 2023-07-24 ENCOUNTER — CONSULT (OUTPATIENT)
Dept: SURGERY | Facility: CLINIC | Age: 28
End: 2023-07-24
Payer: COMMERCIAL

## 2023-07-24 VITALS
HEIGHT: 64 IN | HEART RATE: 89 BPM | RESPIRATION RATE: 18 BRPM | OXYGEN SATURATION: 100 % | TEMPERATURE: 97.7 F | BODY MASS INDEX: 25.81 KG/M2 | DIASTOLIC BLOOD PRESSURE: 68 MMHG | WEIGHT: 151.2 LBS | SYSTOLIC BLOOD PRESSURE: 102 MMHG

## 2023-07-24 DIAGNOSIS — R10.13 EPIGASTRIC PAIN: ICD-10-CM

## 2023-07-24 DIAGNOSIS — K80.20 GALLSTONES: ICD-10-CM

## 2023-07-24 PROCEDURE — 99244 OFF/OP CNSLTJ NEW/EST MOD 40: CPT | Performed by: SURGERY

## 2023-07-24 NOTE — H&P (VIEW-ONLY)
US FINDINGS:     PANCREAS:  Portions of the pancreas are obscured by bowel gas. Visualized portions of the pancreas are unremarkable.     AORTA AND IVC:  Visualized portions are normal for patient age.     LIVER:  Size:  Within normal range. The liver measures 14.2 cm in the midclavicular line. Contour:  Surface contour is smooth. Parenchyma:  Echogenicity and echotexture are within normal limits. No liver mass identified. Limited imaging of the main portal vein shows it to be patent and hepatopetal.     BILIARY:  The gallbladder is normal in caliber. No wall thickening or pericholecystic fluid. There are multiple dependent calculi without sludge. No sonographic Crump sign. No intrahepatic biliary dilatation. CBD measures 2.0 mm. No choledocholithiasis.     KIDNEY:  Right kidney measures 10.2 x 3.9 x 5.5 cm. Volume 115.9 mL  Kidney within normal limits.     ASCITES:   None.     IMPRESSION:     Cholelithiasis without sonographic evidence of acute cholecystitis    Assessment/Plan:       1. Gallstones  -     Ambulatory Referral to General Surgery  -     Case request operating room: CHOLECYSTECTOMY LAPAROSCOPIC; Standing  -     Case request operating room: CHOLECYSTECTOMY LAPAROSCOPIC    2. Epigastric pain  -     Ambulatory Referral to General Surgery  -     Case request operating room: CHOLECYSTECTOMY LAPAROSCOPIC; Standing  -     Case request operating room: CHOLECYSTECTOMY LAPAROSCOPIC      The risks and benefits of cholecystectomy were discussed and the plan for laparoscopic cholecystectomy was outlined with the use a picture for visual aid. We discussed the risks not limited to bleeding, infection, bile duct injury and reactions to anesthetic medications. We discussed the anticipated approach and incisions and the anticipated postoperative course. Patient's questions were answered and a consent form was signed. Subjective:      Patient ID: Gearold Sandifer is a 28 y.o. female.     Triage Notes:    Mario Cr is a 27 yo F presenting with gallstones. Starting about a month ago she started having epigastric, RUQ and right sided back pain and into the shoulder blades. Flares have been significant lasting up to a week. Saw GI the day after the ED. Also performed an endoscopy out of concern for peptic ulcer disease. Prior surgery:   Hysterectomy - 11/9  Nausea with anesthesia. The following portions of the patient's history were reviewed and updated as appropriate:   She  has a past medical history of ADHD, Anxiety, Asthma, GERD (gastroesophageal reflux disease), Irritable bowel syndrome, and Migraines. She   Patient Active Problem List    Diagnosis Date Noted   • Smoking 09/20/2022   • Vertigo 08/29/2022   • Lower abdominal pain 10/24/2018   • Diarrhea 10/24/2018   • Epigastric pain 10/24/2018   • Black stools 10/24/2018   • Migraine 10/30/2017     She  has a past surgical history that includes Tonsilectomy, adenoidectomy, bilateral myringotomy and tubes; Soft tissue mass excision; CHROMOSOME ANALYSIS, PRODUCTS OF CONCEPTION (HISTORICAL); [place] tooth extraction; Dilation and curettage of uterus; pr colonoscopy flx dx w/collj spec when pfrmd (N/A, 10/31/2018); pr dilation & curettage dx&/ther nonobstetric (N/A, 09/20/2022); pr vag hyst 250 gm/< w/rmvl tube&/ovary (Bilateral, 11/09/2022); Hysterectomy (N/A); and Colonoscopy. Her family history includes Diabetes in her mother; Heart disease in her father; Hypertension in her father; Stroke in her father. She  reports that she has been smoking cigarettes. She has been smoking an average of .25 packs per day. She has never used smokeless tobacco. She reports that she does not drink alcohol and does not use drugs.   Current Outpatient Medications on File Prior to Visit   Medication Sig   • acetaminophen (TYLENOL) 650 mg CR tablet Take 1 tablet (650 mg total) by mouth every 8 (eight) hours as needed for mild pain   • albuterol (Proventil HFA) 90 mcg/act inhaler Inhale 2 puffs every 6 (six) hours as needed for wheezing or shortness of breath   • dicyclomine (BENTYL) 20 mg tablet Take 1 tablet (20 mg total) by mouth every 6 (six) hours as needed (abdominal pain)   • ibuprofen (MOTRIN) 600 mg tablet Take 1 tablet (600 mg total) by mouth every 6 (six) hours as needed for mild pain   • melatonin 1 mg Take 1 mg by mouth daily at bedtime   • pantoprazole (PROTONIX) 40 mg tablet Take 1 tablet (40 mg total) by mouth daily   • sucralfate (CARAFATE) 1 g tablet Take 1 tablet (1 g total) by mouth 4 (four) times a day   • [DISCONTINUED] butalbital-acetaminophen-caffeine-codeine (FIORICET WITH CODEINE) -12-30 MG per capsule Take 1 capsule by mouth every 4 (four) hours as needed for headaches prn (Patient not taking: Reported on 7/24/2023)   • [DISCONTINUED] pantoprazole (PROTONIX) 40 mg tablet Take 1 tablet (40 mg total) by mouth daily     No current facility-administered medications on file prior to visit. She is allergic to imitrex [sumatriptan], penicillins, codeine, doxycycline, zithromax [azithromycin], and amitriptyline. .    Review of Systems   Constitutional: Negative for activity change and appetite change. HENT: Negative for congestion, hearing loss, sore throat and trouble swallowing. Eyes: Negative for discharge and visual disturbance. Respiratory: Negative for cough, chest tightness, shortness of breath and wheezing. Cardiovascular: Negative for chest pain and palpitations. Gastrointestinal:        Per HPI     Endocrine: Negative for cold intolerance and heat intolerance. Genitourinary: Negative for difficulty urinating. Musculoskeletal: Negative for gait problem. Skin: Negative for color change, rash and wound. Neurological: Negative for dizziness, speech difficulty and headaches. Psychiatric/Behavioral: Negative for behavioral problems and confusion. The patient is not nervous/anxious.           Objective:      /68 (BP Location: Left arm, Patient Position: Sitting, Cuff Size: Standard)   Pulse 89   Temp 97.7 °F (36.5 °C)   Resp 18   Ht 5' 4" (1.626 m)   Wt 68.6 kg (151 lb 3.2 oz)   LMP 08/01/2022 (Exact Date)   SpO2 100%   BMI 25.95 kg/m²     Below is the patient's most recent value for Albumin, ALT, AST, BUN, Calcium, Chloride, Cholesterol, CO2, Creatinine, GFR, Glucose, HDL, Hematocrit, Hemoglobin, Hemoglobin A1C, LDL, Magnesium, Phosphorus, Platelets, Potassium, PSA, Sodium, Triglycerides, and WBC. Lab Results   Component Value Date    ALT 9 07/05/2023    AST 11 (L) 07/05/2023    BUN 12 07/05/2023    CALCIUM 9.1 07/05/2023     07/05/2023    CO2 25 07/05/2023    CREATININE 0.68 07/05/2023    HCT 41.9 07/05/2023    HGB 14.1 07/05/2023    HGBA1C 4.9 10/20/2022     07/05/2023    K 3.8 07/05/2023    WBC 6.21 07/05/2023     Note: for a comprehensive list of the patient's lab results, access the Results Review activity. Physical Exam  Vitals and nursing note reviewed. Constitutional:       General: She is not in acute distress. Appearance: She is well-developed. She is not diaphoretic. HENT:      Head: Normocephalic and atraumatic. Eyes:      Pupils: Pupils are equal, round, and reactive to light. Cardiovascular:      Rate and Rhythm: Normal rate and regular rhythm. Pulmonary:      Effort: Pulmonary effort is normal.      Breath sounds: Normal breath sounds. Abdominal:      Palpations: Abdomen is soft. Musculoskeletal:         General: Normal range of motion. Cervical back: Normal range of motion and neck supple. Skin:     General: Skin is warm and dry. Neurological:      Mental Status: She is alert and oriented to person, place, and time.              Procedures

## 2023-07-24 NOTE — PROGRESS NOTES
US FINDINGS:     PANCREAS:  Portions of the pancreas are obscured by bowel gas. Visualized portions of the pancreas are unremarkable.     AORTA AND IVC:  Visualized portions are normal for patient age.     LIVER:  Size:  Within normal range. The liver measures 14.2 cm in the midclavicular line. Contour:  Surface contour is smooth. Parenchyma:  Echogenicity and echotexture are within normal limits. No liver mass identified. Limited imaging of the main portal vein shows it to be patent and hepatopetal.     BILIARY:  The gallbladder is normal in caliber. No wall thickening or pericholecystic fluid. There are multiple dependent calculi without sludge. No sonographic Crump sign. No intrahepatic biliary dilatation. CBD measures 2.0 mm. No choledocholithiasis.     KIDNEY:  Right kidney measures 10.2 x 3.9 x 5.5 cm. Volume 115.9 mL  Kidney within normal limits.     ASCITES:   None.     IMPRESSION:     Cholelithiasis without sonographic evidence of acute cholecystitis    Assessment/Plan:       1. Gallstones  -     Ambulatory Referral to General Surgery  -     Case request operating room: CHOLECYSTECTOMY LAPAROSCOPIC; Standing  -     Case request operating room: CHOLECYSTECTOMY LAPAROSCOPIC    2. Epigastric pain  -     Ambulatory Referral to General Surgery  -     Case request operating room: CHOLECYSTECTOMY LAPAROSCOPIC; Standing  -     Case request operating room: CHOLECYSTECTOMY LAPAROSCOPIC      The risks and benefits of cholecystectomy were discussed and the plan for laparoscopic cholecystectomy was outlined with the use a picture for visual aid. We discussed the risks not limited to bleeding, infection, bile duct injury and reactions to anesthetic medications. We discussed the anticipated approach and incisions and the anticipated postoperative course. Patient's questions were answered and a consent form was signed. Subjective:      Patient ID: Marene Duverney is a 28 y.o. female.     Triage Notes:    Larry Schrader is a 27 yo F presenting with gallstones. Starting about a month ago she started having epigastric, RUQ and right sided back pain and into the shoulder blades. Flares have been significant lasting up to a week. Saw GI the day after the ED. Also performed an endoscopy out of concern for peptic ulcer disease. Prior surgery:   Hysterectomy - 11/9  Nausea with anesthesia. The following portions of the patient's history were reviewed and updated as appropriate:   She  has a past medical history of ADHD, Anxiety, Asthma, GERD (gastroesophageal reflux disease), Irritable bowel syndrome, and Migraines. She   Patient Active Problem List    Diagnosis Date Noted   • Smoking 09/20/2022   • Vertigo 08/29/2022   • Lower abdominal pain 10/24/2018   • Diarrhea 10/24/2018   • Epigastric pain 10/24/2018   • Black stools 10/24/2018   • Migraine 10/30/2017     She  has a past surgical history that includes Tonsilectomy, adenoidectomy, bilateral myringotomy and tubes; Soft tissue mass excision; CHROMOSOME ANALYSIS, PRODUCTS OF CONCEPTION (HISTORICAL); [place] tooth extraction; Dilation and curettage of uterus; pr colonoscopy flx dx w/collj spec when pfrmd (N/A, 10/31/2018); pr dilation & curettage dx&/ther nonobstetric (N/A, 09/20/2022); pr vag hyst 250 gm/< w/rmvl tube&/ovary (Bilateral, 11/09/2022); Hysterectomy (N/A); and Colonoscopy. Her family history includes Diabetes in her mother; Heart disease in her father; Hypertension in her father; Stroke in her father. She  reports that she has been smoking cigarettes. She has been smoking an average of .25 packs per day. She has never used smokeless tobacco. She reports that she does not drink alcohol and does not use drugs.   Current Outpatient Medications on File Prior to Visit   Medication Sig   • acetaminophen (TYLENOL) 650 mg CR tablet Take 1 tablet (650 mg total) by mouth every 8 (eight) hours as needed for mild pain   • albuterol (Proventil HFA) 90 mcg/act inhaler Inhale 2 puffs every 6 (six) hours as needed for wheezing or shortness of breath   • dicyclomine (BENTYL) 20 mg tablet Take 1 tablet (20 mg total) by mouth every 6 (six) hours as needed (abdominal pain)   • ibuprofen (MOTRIN) 600 mg tablet Take 1 tablet (600 mg total) by mouth every 6 (six) hours as needed for mild pain   • melatonin 1 mg Take 1 mg by mouth daily at bedtime   • pantoprazole (PROTONIX) 40 mg tablet Take 1 tablet (40 mg total) by mouth daily   • sucralfate (CARAFATE) 1 g tablet Take 1 tablet (1 g total) by mouth 4 (four) times a day   • [DISCONTINUED] butalbital-acetaminophen-caffeine-codeine (FIORICET WITH CODEINE) -59-30 MG per capsule Take 1 capsule by mouth every 4 (four) hours as needed for headaches prn (Patient not taking: Reported on 7/24/2023)   • [DISCONTINUED] pantoprazole (PROTONIX) 40 mg tablet Take 1 tablet (40 mg total) by mouth daily     No current facility-administered medications on file prior to visit. She is allergic to imitrex [sumatriptan], penicillins, codeine, doxycycline, zithromax [azithromycin], and amitriptyline. .    Review of Systems   Constitutional: Negative for activity change and appetite change. HENT: Negative for congestion, hearing loss, sore throat and trouble swallowing. Eyes: Negative for discharge and visual disturbance. Respiratory: Negative for cough, chest tightness, shortness of breath and wheezing. Cardiovascular: Negative for chest pain and palpitations. Gastrointestinal:        Per HPI     Endocrine: Negative for cold intolerance and heat intolerance. Genitourinary: Negative for difficulty urinating. Musculoskeletal: Negative for gait problem. Skin: Negative for color change, rash and wound. Neurological: Negative for dizziness, speech difficulty and headaches. Psychiatric/Behavioral: Negative for behavioral problems and confusion. The patient is not nervous/anxious.           Objective:      /68 (BP Location: Left arm, Patient Position: Sitting, Cuff Size: Standard)   Pulse 89   Temp 97.7 °F (36.5 °C)   Resp 18   Ht 5' 4" (1.626 m)   Wt 68.6 kg (151 lb 3.2 oz)   LMP 08/01/2022 (Exact Date)   SpO2 100%   BMI 25.95 kg/m²     Below is the patient's most recent value for Albumin, ALT, AST, BUN, Calcium, Chloride, Cholesterol, CO2, Creatinine, GFR, Glucose, HDL, Hematocrit, Hemoglobin, Hemoglobin A1C, LDL, Magnesium, Phosphorus, Platelets, Potassium, PSA, Sodium, Triglycerides, and WBC. Lab Results   Component Value Date    ALT 9 07/05/2023    AST 11 (L) 07/05/2023    BUN 12 07/05/2023    CALCIUM 9.1 07/05/2023     07/05/2023    CO2 25 07/05/2023    CREATININE 0.68 07/05/2023    HCT 41.9 07/05/2023    HGB 14.1 07/05/2023    HGBA1C 4.9 10/20/2022     07/05/2023    K 3.8 07/05/2023    WBC 6.21 07/05/2023     Note: for a comprehensive list of the patient's lab results, access the Results Review activity. Physical Exam  Vitals and nursing note reviewed. Constitutional:       General: She is not in acute distress. Appearance: She is well-developed. She is not diaphoretic. HENT:      Head: Normocephalic and atraumatic. Eyes:      Pupils: Pupils are equal, round, and reactive to light. Cardiovascular:      Rate and Rhythm: Normal rate and regular rhythm. Pulmonary:      Effort: Pulmonary effort is normal.      Breath sounds: Normal breath sounds. Abdominal:      Palpations: Abdomen is soft. Musculoskeletal:         General: Normal range of motion. Cervical back: Normal range of motion and neck supple. Skin:     General: Skin is warm and dry. Neurological:      Mental Status: She is alert and oriented to person, place, and time.              Procedures

## 2023-07-27 ENCOUNTER — ANESTHESIA EVENT (OUTPATIENT)
Dept: PERIOP | Facility: HOSPITAL | Age: 28
End: 2023-07-27
Payer: COMMERCIAL

## 2023-07-27 NOTE — PRE-PROCEDURE INSTRUCTIONS
Pre-Surgery Instructions:   Medication Instructions   • acetaminophen (TYLENOL) 650 mg CR tablet PRN   • albuterol (Proventil HFA) 90 mcg/act inhaler PRN   • dicyclomine (BENTYL) 20 mg tablet Take as directed   • ibuprofen (MOTRIN) 600 mg tablet Stop taking 7 days prior to surgery. • melatonin 1 mg PRN   • pantoprazole (PROTONIX) 40 mg tablet Take as directed      Medication instructions for day surgery reviewed. Please use only a sip of water to take your instructed medications. Avoid all over the counter vitamins, supplements and NSAIDS for one week prior to surgery per anesthesia guidelines. Tylenol is ok to take as needed. You will receive a call one business day prior to surgery with an arrival time and hospital directions. If your surgery is scheduled on a Monday, the hospital will be calling you on the Friday prior to your surgery. If you have not heard from anyone by 8pm, please call the hospital supervisor through the hospital  at [phone]. Yusuf Levimathew [phone]). Do not eat or drink anything after midnight the night before your surgery, including candy, mints, lifesavers, or chewing gum. Do not drink alcohol 24hrs before your surgery. Try not to smoke at least 24hrs before your surgery. Follow the pre surgery showering instructions as listed in the [facility] Surgical Experience Booklet” or otherwise provided by your surgeon's office. Do not shave the surgical area 24 hours before surgery. Do not apply any lotions, creams, including makeup, cologne, deodorant, or perfumes after showering on the day of your surgery. No contact lenses, eye make-up, or artificial eyelashes. Remove nail polish, including gel polish, and any artificial, gel, or acrylic nails if possible. Remove all jewelry including rings and body piercing jewelry. Wear causal clothing that is easy to take on and off. Consider your type of surgery. Keep any valuables, jewelry, piercings at home.  Please bring any specially ordered equipment (sling, braces) if indicated. Arrange for a responsible person to drive you to and from the hospital on the day of your surgery. Visitor Guidelines discussed. Call the surgeon's office with any new illnesses, exposures, or additional questions prior to surgery. Please reference your [facility] Surgical Experience Booklet” for additional information to prepare for your upcoming surgery.

## 2023-08-02 ENCOUNTER — HOSPITAL ENCOUNTER (OUTPATIENT)
Facility: HOSPITAL | Age: 28
Setting detail: OUTPATIENT SURGERY
Discharge: HOME | End: 2023-08-02
Attending: SURGERY | Admitting: SURGERY
Payer: COMMERCIAL

## 2023-08-02 ENCOUNTER — ANESTHESIA (OUTPATIENT)
Dept: PERIOP | Facility: HOSPITAL | Age: 28
End: 2023-08-02
Payer: COMMERCIAL

## 2023-08-02 VITALS
OXYGEN SATURATION: 100 % | BODY MASS INDEX: 25.82 KG/M2 | HEIGHT: 64 IN | SYSTOLIC BLOOD PRESSURE: 114 MMHG | HEART RATE: 58 BPM | DIASTOLIC BLOOD PRESSURE: 69 MMHG | RESPIRATION RATE: 16 BRPM | TEMPERATURE: 97.6 F | WEIGHT: 151.24 LBS

## 2023-08-02 DIAGNOSIS — R10.13 EPIGASTRIC PAIN: ICD-10-CM

## 2023-08-02 DIAGNOSIS — K80.20 GALLSTONES: ICD-10-CM

## 2023-08-02 PROCEDURE — 47562 LAPAROSCOPIC CHOLECYSTECTOMY: CPT | Performed by: PHYSICIAN ASSISTANT

## 2023-08-02 PROCEDURE — 47562 LAPAROSCOPIC CHOLECYSTECTOMY: CPT | Performed by: SURGERY

## 2023-08-02 PROCEDURE — 88304 TISSUE EXAM BY PATHOLOGIST: CPT | Performed by: PATHOLOGY

## 2023-08-02 RX ORDER — DIPHENHYDRAMINE HYDROCHLORIDE 50 MG/ML
12.5 INJECTION INTRAMUSCULAR; INTRAVENOUS ONCE AS NEEDED
Status: DISCONTINUED | OUTPATIENT
Start: 2023-08-02 | End: 2023-08-02 | Stop reason: HOSPADM

## 2023-08-02 RX ORDER — CLINDAMYCIN PHOSPHATE 600 MG/50ML
600 INJECTION INTRAVENOUS EVERY 8 HOURS
Status: DISCONTINUED | OUTPATIENT
Start: 2023-08-02 | End: 2023-08-02

## 2023-08-02 RX ORDER — LIDOCAINE HYDROCHLORIDE 10 MG/ML
0.5 INJECTION, SOLUTION EPIDURAL; INFILTRATION; INTRACAUDAL; PERINEURAL ONCE AS NEEDED
Status: DISCONTINUED | OUTPATIENT
Start: 2023-08-02 | End: 2023-08-02 | Stop reason: HOSPADM

## 2023-08-02 RX ORDER — LIDOCAINE HYDROCHLORIDE 10 MG/ML
INJECTION, SOLUTION EPIDURAL; INFILTRATION; INTRACAUDAL; PERINEURAL AS NEEDED
Status: DISCONTINUED | OUTPATIENT
Start: 2023-08-02 | End: 2023-08-02

## 2023-08-02 RX ORDER — SODIUM CHLORIDE, SODIUM LACTATE, POTASSIUM CHLORIDE, CALCIUM CHLORIDE 600; 310; 30; 20 MG/100ML; MG/100ML; MG/100ML; MG/100ML
125 INJECTION, SOLUTION INTRAVENOUS CONTINUOUS
Status: DISCONTINUED | OUTPATIENT
Start: 2023-08-02 | End: 2023-08-02 | Stop reason: HOSPADM

## 2023-08-02 RX ORDER — CLINDAMYCIN PHOSPHATE 600 MG/50ML
600 INJECTION INTRAVENOUS ONCE
Status: COMPLETED | OUTPATIENT
Start: 2023-08-02 | End: 2023-08-02

## 2023-08-02 RX ORDER — PROPOFOL 10 MG/ML
INJECTION, EMULSION INTRAVENOUS AS NEEDED
Status: DISCONTINUED | OUTPATIENT
Start: 2023-08-02 | End: 2023-08-02

## 2023-08-02 RX ORDER — ROCURONIUM BROMIDE 10 MG/ML
INJECTION, SOLUTION INTRAVENOUS AS NEEDED
Status: DISCONTINUED | OUTPATIENT
Start: 2023-08-02 | End: 2023-08-02

## 2023-08-02 RX ORDER — ONDANSETRON 2 MG/ML
INJECTION INTRAMUSCULAR; INTRAVENOUS AS NEEDED
Status: DISCONTINUED | OUTPATIENT
Start: 2023-08-02 | End: 2023-08-02

## 2023-08-02 RX ORDER — DEXAMETHASONE SODIUM PHOSPHATE 10 MG/ML
INJECTION, SOLUTION INTRAMUSCULAR; INTRAVENOUS AS NEEDED
Status: DISCONTINUED | OUTPATIENT
Start: 2023-08-02 | End: 2023-08-02

## 2023-08-02 RX ORDER — FENTANYL CITRATE 50 UG/ML
INJECTION, SOLUTION INTRAMUSCULAR; INTRAVENOUS AS NEEDED
Status: DISCONTINUED | OUTPATIENT
Start: 2023-08-02 | End: 2023-08-02

## 2023-08-02 RX ORDER — HYDROMORPHONE HCL/PF 1 MG/ML
0.5 SYRINGE (ML) INJECTION
Status: DISCONTINUED | OUTPATIENT
Start: 2023-08-02 | End: 2023-08-02 | Stop reason: HOSPADM

## 2023-08-02 RX ORDER — FENTANYL CITRATE/PF 50 MCG/ML
50 SYRINGE (ML) INJECTION
Status: DISCONTINUED | OUTPATIENT
Start: 2023-08-02 | End: 2023-08-02 | Stop reason: HOSPADM

## 2023-08-02 RX ORDER — DOCUSATE SODIUM 100 MG/1
100 CAPSULE, LIQUID FILLED ORAL 2 TIMES DAILY
Qty: 20 CAPSULE | Refills: 0 | Status: SHIPPED | OUTPATIENT
Start: 2023-08-02 | End: 2023-08-17 | Stop reason: ALTCHOICE

## 2023-08-02 RX ORDER — MIDAZOLAM HYDROCHLORIDE 2 MG/2ML
INJECTION, SOLUTION INTRAMUSCULAR; INTRAVENOUS AS NEEDED
Status: DISCONTINUED | OUTPATIENT
Start: 2023-08-02 | End: 2023-08-02

## 2023-08-02 RX ORDER — ONDANSETRON 2 MG/ML
4 INJECTION INTRAMUSCULAR; INTRAVENOUS ONCE AS NEEDED
Status: COMPLETED | OUTPATIENT
Start: 2023-08-02 | End: 2023-08-02

## 2023-08-02 RX ORDER — HYDROCODONE BITARTRATE AND ACETAMINOPHEN 5; 325 MG/1; MG/1
1 TABLET ORAL EVERY 6 HOURS PRN
Qty: 12 TABLET | Refills: 0 | Status: SHIPPED | OUTPATIENT
Start: 2023-08-02 | End: 2023-08-05

## 2023-08-02 RX ORDER — HYDROMORPHONE HCL/PF 1 MG/ML
0.2 SYRINGE (ML) INJECTION
Status: DISCONTINUED | OUTPATIENT
Start: 2023-08-02 | End: 2023-08-02 | Stop reason: HOSPADM

## 2023-08-02 RX ORDER — MAGNESIUM HYDROXIDE 1200 MG/15ML
LIQUID ORAL AS NEEDED
Status: DISCONTINUED | OUTPATIENT
Start: 2023-08-02 | End: 2023-08-02 | Stop reason: HOSPADM

## 2023-08-02 RX ORDER — METOCLOPRAMIDE HYDROCHLORIDE 5 MG/ML
10 INJECTION INTRAMUSCULAR; INTRAVENOUS ONCE AS NEEDED
Status: DISCONTINUED | OUTPATIENT
Start: 2023-08-02 | End: 2023-08-02 | Stop reason: HOSPADM

## 2023-08-02 RX ORDER — PROPOFOL 10 MG/ML
INJECTION, EMULSION INTRAVENOUS CONTINUOUS PRN
Status: DISCONTINUED | OUTPATIENT
Start: 2023-08-02 | End: 2023-08-02

## 2023-08-02 RX ORDER — DEXMEDETOMIDINE HYDROCHLORIDE 100 UG/ML
INJECTION, SOLUTION INTRAVENOUS AS NEEDED
Status: DISCONTINUED | OUTPATIENT
Start: 2023-08-02 | End: 2023-08-02

## 2023-08-02 RX ORDER — SCOLOPAMINE TRANSDERMAL SYSTEM 1 MG/1
1 PATCH, EXTENDED RELEASE TRANSDERMAL
Status: DISCONTINUED | OUTPATIENT
Start: 2023-08-02 | End: 2023-08-02 | Stop reason: HOSPADM

## 2023-08-02 RX ADMIN — PROPOFOL 120 MG: 10 INJECTION, EMULSION INTRAVENOUS at 09:02

## 2023-08-02 RX ADMIN — ONDANSETRON 4 MG: 2 INJECTION INTRAMUSCULAR; INTRAVENOUS at 09:37

## 2023-08-02 RX ADMIN — ONDANSETRON 4 MG: 2 INJECTION INTRAMUSCULAR; INTRAVENOUS at 10:10

## 2023-08-02 RX ADMIN — MIDAZOLAM HYDROCHLORIDE 2 MG: 2 INJECTION, SOLUTION INTRAMUSCULAR; INTRAVENOUS at 09:00

## 2023-08-02 RX ADMIN — ROCURONIUM BROMIDE 50 MG: 10 INJECTION, SOLUTION INTRAVENOUS at 09:03

## 2023-08-02 RX ADMIN — FENTANYL CITRATE 50 MCG: 50 INJECTION, SOLUTION INTRAMUSCULAR; INTRAVENOUS at 09:02

## 2023-08-02 RX ADMIN — DEXMEDETOMIDINE HYDROCHLORIDE 4 MCG: 100 INJECTION, SOLUTION INTRAVENOUS at 09:35

## 2023-08-02 RX ADMIN — SCOPALAMINE 1 PATCH: 1 PATCH, EXTENDED RELEASE TRANSDERMAL at 08:28

## 2023-08-02 RX ADMIN — LIDOCAINE HYDROCHLORIDE 50 MG: 10 INJECTION, SOLUTION EPIDURAL; INFILTRATION; INTRACAUDAL; PERINEURAL at 09:02

## 2023-08-02 RX ADMIN — CLINDAMYCIN PHOSPHATE 600 MG: 600 INJECTION, SOLUTION INTRAVENOUS at 08:56

## 2023-08-02 RX ADMIN — DEXMEDETOMIDINE HYDROCHLORIDE 4 MCG: 100 INJECTION, SOLUTION INTRAVENOUS at 09:07

## 2023-08-02 RX ADMIN — DEXMEDETOMIDINE HYDROCHLORIDE 4 MCG: 100 INJECTION, SOLUTION INTRAVENOUS at 09:19

## 2023-08-02 RX ADMIN — FENTANYL CITRATE 50 MCG: 50 INJECTION, SOLUTION INTRAMUSCULAR; INTRAVENOUS at 09:23

## 2023-08-02 RX ADMIN — DEXAMETHASONE SODIUM PHOSPHATE 10 MG: 10 INJECTION, SOLUTION INTRAMUSCULAR; INTRAVENOUS at 09:03

## 2023-08-02 RX ADMIN — PROPOFOL 60 MCG/KG/MIN: 10 INJECTION, EMULSION INTRAVENOUS at 09:03

## 2023-08-02 RX ADMIN — SODIUM CHLORIDE, SODIUM LACTATE, POTASSIUM CHLORIDE, AND CALCIUM CHLORIDE 125 ML/HR: .6; .31; .03; .02 INJECTION, SOLUTION INTRAVENOUS at 07:59

## 2023-08-02 NOTE — ANESTHESIA PREPROCEDURE EVALUATION
Procedure:  CHOLECYSTECTOMY LAPAROSCOPIC (Abdomen)    Relevant Problems   ANESTHESIA (within normal limits)      CARDIO (within normal limits)   (+) Migraine      ENDO (within normal limits)      GI/HEPATIC (within normal limits)      /RENAL (within normal limits)      GYN (within normal limits)      HEMATOLOGY (within normal limits)      MUSCULOSKELETAL (within normal limits)      NEURO/PSYCH   (+) Migraine      PULMONARY   (+) Smoking      Other   (+) Epigastric pain        Physical Exam    Airway    Mallampati score: II  TM Distance: >3 FB  Neck ROM: full     Dental   No notable dental hx     Cardiovascular  Rhythm: regular, Rate: normal, Cardiovascular exam normal    Pulmonary  Pulmonary exam normal Breath sounds clear to auscultation,     Other Findings        Anesthesia Plan  ASA Score- 2     Anesthesia Type- general with ASA Monitors. Additional Monitors:   Airway Plan: ETT. Plan Factors-Exercise tolerance (METS): >4 METS. Chart reviewed. EKG reviewed. Imaging results reviewed. Existing labs reviewed. Patient summary reviewed. Induction- intravenous. Postoperative Plan- Plan for postoperative opioid use. Informed Consent- Anesthetic plan and risks discussed with patient. I personally reviewed this patient with the CRNA. Discussed and agreed on the Anesthesia Plan with the CRNA. Hollie Gandhi Recent labs personally reviewed:  Lab Results   Component Value Date    WBC 6.21 07/05/2023    HGB 14.1 07/05/2023     07/05/2023     Lab Results   Component Value Date    K 3.8 07/05/2023    BUN 12 07/05/2023    CREATININE 0.68 07/05/2023     No results found for: "PTT"   No results found for: "INR"    Blood type A    Lab Results   Component Value Date    HGBA1C 4.9 10/20/2022       I, Juan Manuel Mercedes MD, have personally seen and evaluated the patient prior to anesthetic care.   I have reviewed the pre-anesthetic record, and other medical records if appropriate to the anesthetic care. If a CRNA is involved in the case, I have reviewed the CRNA assessment, if present, and agree. Risks/benefits and alternatives discussed with patient including possible PONV, sore throat, and possibility of rare anesthetic and surgical emergencies.

## 2023-08-02 NOTE — OP NOTE
OPERATIVE REPORT  PATIENT NAME: Gearold Sandifer    :  1995  MRN: [id number]  Pt Location: [facility] ROOM 02    SURGERY DATE: 2023    Surgeon(s) and Role:     * Edward Iniguez MD - Primary     * Britta Urbina PA-C - Assisting    Preop Diagnosis:  Gallstones [K80.20]  Epigastric pain [R10.13]    Post-Op Diagnosis Codes:     * Gallstones [K80.20]     * Epigastric pain [R10.13]    Procedure(s):  CHOLECYSTECTOMY LAPAROSCOPIC    Specimen(s):  ID Type Source Tests Collected by Time Destination   1 : Gallbladder and Contents Tissue Gallbladder TISSUE EXAM Edward Iniguez MD 2023 0934        Estimated Blood Loss:   Minimal    Drains:  none    Anesthesia Type:   General    Operative Indications:  Gallstones [K80.20]  Epigastric pain [R10.13]      Operative Findings:  Distended gallbladder  Small cystic duct    Complications:   None    Procedure and Technique:  The patient was brought to the operating room and placed in supine position. The patient  was sedated and intubated and preoperative antibiotics were given. The abdomen was prepped and draped in the usual sterile fashion. A time-out was carried out and initiated by myself and confirmed the correct patient, procedure, antibiotics any additional concerns. In the infra-umbilical position a combination of 1% lidocaine and 0.25% Marcaine was instilled. An 11 blade was used to make a 10 mm incision. This was carried down to the fascia and a True Roche was used to grasp the umbilical stalk. The fascia was incised in the midline using the 11 blade. A 0 Vicryl on a UR6 needle was used to place a Watkins port and the abdomen was insufflated. Additional 5 mm ports were placed in the subxiphoid and right subcostal positions. The gallbladder was  distended. The gallbladder was grasped with a locking grasper and retracted cephalad. The gallbladder was grasped and the triangle of calot was dissected using a maryland.   .Using a maryland - the critical view was obtained. Clips were used to clip and then ligate the duct and artery. The gallbladder was then taken off of the gallbladder fossa using the hook electrocautery. The gallbladder was placed in an Endo-Catch bag and removed from the abdomen. Under direct vision the 5 mm ports were removed and the abdomen was desufflated. The Kevin port was then also removed and the Vicryl sutures were tied in the midline to close the umbilical port site. A 4 0 Monocryl was used to close the skin of all of the ports. Steris, guaze and tegaderm was placed over the incisions. The patient was then awakened and transported over to the stretcher and to PACU. I was present for the entire procedure., A qualified resident physician was not available. and A physician assistant was required during the procedure for retraction, tissue handling, dissection and suturing.     Patient Disposition:  PACU  and extubated and stable        SIGNATURE: Celeste Jara MD  DATE: August 2, 2023  TIME: 9:53 AM

## 2023-08-02 NOTE — ANESTHESIA POSTPROCEDURE EVALUATION
Post-Op Assessment Note    CV Status:  Stable    Pain management: satisfactory to patient     Mental Status:  Sleepy and arousable   Hydration Status:  Euvolemic   PONV Controlled:  Controlled   Airway Patency:  Patent      Post Op Vitals Reviewed: Yes      Staff: CRNA, Anesthesiologist         There were no known notable events for this encounter.     BP   109/69   Temp   98.6   Pulse  83   Resp   16   SpO2   100

## 2023-08-02 NOTE — DISCHARGE INSTR - AVS FIRST PAGE
Laparoscopic Cholecystectomy   AMBULATORY CARE:   What you need to know about a laparoscopic cholecystectomy:  Laparoscopic cholecystectomy is surgery to remove gallstones and your gallbladder. What will happen during surgery: Your surgeon will make 4 small incisions in your abdomen or belly button. She will insert small tools into the incisions. Your abdomen will be filled with carbon dioxide gas to make room. This helps your surgeon see your organs better and gives more room to move the tools around. Your surgeon will look for and remove gallstones in and around your gallbladder. X-rays or an ultrasound may be used. Your surgeon will remove your gallbladder through one of the incisions. The carbon dioxide will be released from your abdomen. The incisions will be closed with stitches and adhesive strips, then covered with bandages. What to expect after surgery: You will be taken to a recovery room until you are fully awake. Healthcare providers will monitor you closely for any problems. Providers will help you walk around to prevent blood clots. You will be able to go home later the same day. Pain, a sore throat, nausea, and vomiting are common after this surgery. These should get better within a few days. You may also have diarrhea that lasts up to a few months. Medicines may be given to prevent or treat pain, nausea, and vomiting. Medicines may also be given to prevent a bacterial infection. Blood thinners may be given to prevent blood clots. You may be bleed or bruise more easily while you are taking blood thinners. You can remove the outer guaze and tape in 48 hours. You need to take showers instead of baths for a week. Your surgeon will tell you when you can drive, return to work, and do your regular daily activities. You will be shown how to care for the surgery area and check for signs of infection.  You will also be told which foods to eat in the days and weeks after surgery. Risks of a laparoscopic cholecystectomy:   You could bleed more than expected or get an infection. Any carbon dioxide gas still in your body can cause neck and shoulder pain. Your gallbladder may leak bile into your abdomen during or after surgery. This can cause a severe infection or an abscess. You may still have gallstones after surgery. You may need a different procedure to remove them. Your surgeon may need to make a larger incision than expected during surgery. Your bile duct, bowel, or other organs could be damaged during surgery. This can be life-threatening. Call your local emergency number (911 in the [address]) if:   You feel lightheaded, short of breath, and have chest pain. You cough up blood. Seek care immediately if:   Your arm or leg feels warm, tender, and painful. It may look swollen and red. You cannot stop vomiting. Your bowel movements are black or bloody. You have pain in your abdomen and it is swollen or hard. You have a fever over 101°F (38°C) or chills. Call your doctor or surgeon if:   You have pain or nausea that is not relieved by medicine. You have redness and swelling around your incision sites. You have blood or pus leaking from your incision sites. You are constipated, have diarrhea, or your bowel movements are pale. Your skin or eyes are yellow. You have questions or concerns about your surgery, condition, or care. Medicines: You may need any of the following:  Prescription pain medicine  may be given. Ask your healthcare provider how to take this medicine safely. Some prescription pain medicines contain acetaminophen (tylenol). Do not take other medicines that contain acetaminophen (tylenol) without talking to your healthcare provider. Too much acetaminophen may cause liver damage. The usual healthy dose for an adult is less than 4000mg over the course of a day. Prescription pain medicine may cause constipation.  Ask your healthcare provider how to prevent or treat constipation. NSAIDs  help decrease swelling and pain or fever. This medicine is available with or without a doctor's order. NSAIDs can cause stomach bleeding or kidney problems in certain people. If you take blood thinner medicine, always ask your healthcare provider if NSAIDs are safe for you. Always read the medicine label and follow directions. Take your medicine as directed. Contact your healthcare provider if you think your medicine is not helping or if you have side effects. Tell him or her if you are allergic to any medicine. Keep a list of the medicines, vitamins, and herbs you take. Include the amounts, and when and why you take them. Bring the list or the pill bottles to follow-up visits. Carry your medicine list with you in case of an emergency. Take deep breaths and cough 10 times each hour: This will decrease your risk for a lung infection. Take a deep breath and hold it for as long as you can. Then let the air out and cough strongly. You may be given an incentive spirometer to help you take deep breaths. Put the plastic piece in your mouth and take a slow, deep breath. Then let the air out and cough. Repeat these steps 10 times every hour. Care for the surgery area:   Remove the bandages as directed. Your surgeon may tell you to remove the bandages the day after surgery. Keep the area clean and dry. You may take a shower the day after your surgery. Do not take baths, swim, or soak in a hot tub until your surgeon says it is okay. Check for signs of infection each day. Check the area for swelling, red streaks, or pus. Tell your surgeon right away if you see any of these. Hug a pillow against the surgery area before you sneeze or cough. This will help prevent pain and protect the surgery area. What to eat after surgery:   Eat low-fat foods for 4 to 6 weeks  while your body learns to digest fat without a gallbladder.  Slowly increase the amount of fat that you eat. Drink more liquids. Ask how much liquid to drink and which liquids are best for you. When to return to work and other activities:   Rest often  and slowly increase your activity level each day. If an activity causes pain, wait several days before you do that activity again. Do not drive  for the first 24 hours after surgery. Your surgeon will tell you when it is okay to drive after the first 24 hours. This is usually after you have stopped taking narcotic pain medicine for a few days. You must be comfortable wearing a seatbelt and turning to check your blind spot. Do not lift anything heavier than 20 pounds  for 2 weeks, or as directed. You may return to work or other activities  as soon as your pain is controlled and you feel comfortable. This is usually 5 to 7 days after surgery as long as your work does not involve very heavy lifting. Follow up with your doctor or surgeon as directed:  Make an appointment for 2 weeks if one was not already given. Write down your questions so you remember to ask them during your visits. © Copyright [employer] 2022 Information is for End User's use only and may not be sold, redistributed or otherwise used for commercial purposes. All illustrations and images included in CareNotes® are the copyrighted property of [employer] or [address] [place] [address]  The above information is an  only. It is not intended as medical advice for individual conditions or treatments. Talk to your doctor, nurse or pharmacist before following any medical regimen to see if it is safe and effective for you.

## 2023-08-02 NOTE — INTERVAL H&P NOTE
H&P reviewed. After examining the patient I find no changes in the patients condition since the H&P had been written.     Vitals:    08/02/23 0748   BP: 111/67   Pulse: 80   Resp: 18   Temp: 97.9 °F (36.6 °C)   SpO2: 100%

## 2023-08-07 PROCEDURE — 88304 TISSUE EXAM BY PATHOLOGIST: CPT | Performed by: PATHOLOGY

## 2023-08-17 ENCOUNTER — OFFICE VISIT (OUTPATIENT)
Age: 28
End: 2023-08-17
Payer: COMMERCIAL

## 2023-08-17 VITALS
WEIGHT: 149.6 LBS | BODY MASS INDEX: 25.54 KG/M2 | HEIGHT: 64 IN | SYSTOLIC BLOOD PRESSURE: 100 MMHG | DIASTOLIC BLOOD PRESSURE: 64 MMHG

## 2023-08-17 DIAGNOSIS — Z11.3 SCREEN FOR STD (SEXUALLY TRANSMITTED DISEASE): ICD-10-CM

## 2023-08-17 DIAGNOSIS — N76.0 ACUTE VAGINITIS: Primary | ICD-10-CM

## 2023-08-17 DIAGNOSIS — N89.8 VAGINAL DRYNESS: ICD-10-CM

## 2023-08-17 PROBLEM — N63.20 BILATERAL BREAST LUMP: Status: ACTIVE | Noted: 2023-03-11

## 2023-08-17 PROBLEM — J45.30 MILD PERSISTENT ASTHMA WITHOUT COMPLICATION: Status: ACTIVE | Noted: 2017-10-30

## 2023-08-17 PROBLEM — N63.10 BILATERAL BREAST LUMP: Status: ACTIVE | Noted: 2023-03-11

## 2023-08-17 PROBLEM — R10.30 LOWER ABDOMINAL PAIN: Status: RESOLVED | Noted: 2018-10-24 | Resolved: 2023-08-17

## 2023-08-17 PROCEDURE — 99213 OFFICE O/P EST LOW 20 MIN: CPT | Performed by: NURSE PRACTITIONER

## 2023-08-17 PROCEDURE — 87491 CHLMYD TRACH DNA AMP PROBE: CPT | Performed by: NURSE PRACTITIONER

## 2023-08-17 PROCEDURE — 87591 N.GONORRHOEAE DNA AMP PROB: CPT | Performed by: NURSE PRACTITIONER

## 2023-08-17 PROCEDURE — 87210 SMEAR WET MOUNT SALINE/INK: CPT | Performed by: NURSE PRACTITIONER

## 2023-08-17 RX ORDER — METRONIDAZOLE 7.5 MG/G
1 GEL VAGINAL
Qty: 5 G | Refills: 0 | Status: SHIPPED | OUTPATIENT
Start: 2023-08-17 | End: 2023-08-22

## 2023-08-17 RX ORDER — BUDESONIDE AND FORMOTEROL FUMARATE DIHYDRATE 160; 4.5 UG/1; UG/1
AEROSOL RESPIRATORY (INHALATION)
COMMUNITY
Start: 2023-08-16

## 2023-08-17 NOTE — PATIENT INSTRUCTIONS
Vaginal Discharge   WHAT YOU NEED TO KNOW:   Vaginal discharge is normal. It is usually clear or white and odorless. Vaginal discharge is your body's way of cleaning your vagina so it is healthy. Irritation, itching, burning, or a change in the amount, smell, or color may indicate a problem. DISCHARGE INSTRUCTIONS:   Contact your healthcare provider or gynecologist if:   You have swelling, burning, itching, or irritation in or around your vagina. You have an increase in the amount of discharge. The color or smell of your discharge changes. Your discharge looks similar to cottage cheese. Your discharge is bloody and it is not your monthly period. You have pain during sexual intercourse. You have trouble urinating, or you urinate often and with urgency. You have abdominal pain or cramps. You have a fever or chills. You have low back pain or side pain. You have questions or concerns about your condition or care. Keep your vagina healthy:   Always wipe from front to back  after you use the toilet. This prevents spreading bacteria from your rectal area into your vagina. Clean in and around your vagina with mild soap and warm water each day. Gently dry the area after washing. Do not use hot tubs. The heat and moisture from hot tubs can increase your risk for another yeast infection. Do not wear tight-fitting clothes or undergarments  for long periods. Wear cotton underwear during the day. Cotton helps keep your genital area dry and does not hold in warmth or moisture. Do not wear underwear at night. Change your laundry soap or fabric softener  if you think it is irritating your skin. Do not douche  or use feminine hygiene sprays or bubble bath. Do not use pads or tampons that are scented, or colored or perfumed toilet paper. Ask your healthcare provider about birth control options if necessary. Condoms have latex and diaphragms have gel that kill sperm.  Both of these may irritate your genital area. Follow up with your doctor as directed:  Write down your questions so you remember to ask them during your visits. © Copyright [employer] 2022 Information is for End User's use only and may not be sold, redistributed or otherwise used for commercial purposes. The above information is an  only. It is not intended as medical advice for individual conditions or treatments. Talk to your doctor, nurse or pharmacist before following any medical regimen to see if it is safe and effective for you.

## 2023-08-17 NOTE — PROGRESS NOTES
Diagnoses and all orders for this visit:    Acute vaginitis  -     metroNIDAZOLE (METROGEL) 0.75 % vaginal gel; Insert 1 Application into the vagina daily at bedtime for 5 days  -     POCT wet mount    Vaginal dryness  -     estrogens, conjugated (Premarin) vaginal cream; Insert 0.5 g into the vagina 2 (two) times a week    Other orders        -     Consider phD vaginal tabs, GC/CT done today. Call if no symptom improvement, all questions answered, return for annual. Recommended lubes with sex. Pleasant 28 y.o. here for vaginal complaints of swelling for which she used 5 Diflucans she had at home. She also feels dry like the "[address]" since her hysterectomy this past November. She also requests STD screening since she found out her  was with someone else while she had her recent gallbladder surgery. She did use Azo yeast for 2 days recently with some relief. She was on recent IV antibiotic during her cholecystectomy . She denies douching. She denies fever, pelvic pain or dyspareunia. She denies new sexual partners. She had a Hysterectomy and 1 ovary removed for adenomyosis in . Past Medical History:   Diagnosis Date   • ADHD    • Anxiety    • Asthma     cold weather, exercise induced   • GERD (gastroesophageal reflux disease)    • Irritable bowel syndrome    • Migraines      Past Surgical History:   Procedure Laterality Date   • CHOLECYSTECTOMY LAPAROSCOPIC N/A 2023    Procedure: CHOLECYSTECTOMY LAPAROSCOPIC;  Surgeon: Adan Lebron MD;  Location: [facility];  Service: General   • CHROMOSOME ANALYSIS, PRODUCTS OF CONCEPTION (HISTORICAL)     • COLONOSCOPY     • DILATION AND CURETTAGE OF UTERUS      d&E,    • HYSTERECTOMY N/A    • NJ COLONOSCOPY FLX DX W/COLLJ SPEC WHEN PFRMD N/A 10/31/2018    Procedure: EGD AND COLONOSCOPY;  Surgeon: Mayte Simon MD;  Location: [facility];   Service: Gastroenterology   • NJ DILATION & CURETTAGE DX&/THER NONOBSTETRIC N/A 2022 Procedure: D&C Suction;  Surgeon: Leola Scherer MD;  Location: [facility];  Service: Gynecology   • DC VAG HYST 250 GM/< W/RMVL TUBE&/OVARY Bilateral 11/09/2022    Procedure: TOTAL VAGINAL HYSTERECTOMY BILATERAL SALPINGECTOMY RIGHT OOPHORECTOMY;  Surgeon: Sara Harvey MD;  Location:  MAIN OR;  Service: Gynecology   • SOFT TISSUE MASS EXCISION     • TONSILECTOMY, ADENOIDECTOMY, BILATERAL MYRINGOTOMY AND TUBES     • WISDOM TOOTH EXTRACTION       Social History     Tobacco Use   • Smoking status: Every Day     Packs/day: 0.25     Types: Cigarettes   • Smokeless tobacco: Never   Vaping Use   • Vaping Use: Never used   Substance Use Topics   • Alcohol use: No     Comment: rarely   • Drug use: No     Family History   Problem Relation Age of Onset   • Diabetes Mother    • Hypertension Father    • Heart disease Father    • Stroke Father        Current Outpatient Medications:   •  acetaminophen (TYLENOL) 650 mg CR tablet, Take 1 tablet (650 mg total) by mouth every 8 (eight) hours as needed for mild pain, Disp: 30 tablet, Rfl: 0  •  albuterol (Proventil HFA) 90 mcg/act inhaler, Inhale 2 puffs every 6 (six) hours as needed for wheezing or shortness of breath, Disp: 8 g, Rfl: 1  •  dicyclomine (BENTYL) 20 mg tablet, Take 1 tablet (20 mg total) by mouth every 6 (six) hours as needed (abdominal pain), Disp: 30 tablet, Rfl: 3  •  estrogens, conjugated (Premarin) vaginal cream, Insert 0.5 g into the vagina 2 (two) times a week, Disp: 42.5 g, Rfl: 1  •  ibuprofen (MOTRIN) 600 mg tablet, Take 1 tablet (600 mg total) by mouth every 6 (six) hours as needed for mild pain, Disp: 30 tablet, Rfl: 0  •  melatonin 1 mg, Take 1 mg by mouth daily at bedtime, Disp: , Rfl:   •  metroNIDAZOLE (METROGEL) 0.75 % vaginal gel, Insert 1 Application into the vagina daily at bedtime for 5 days, Disp: 5 g, Rfl: 0  •  pantoprazole (PROTONIX) 40 mg tablet, Take 1 tablet (40 mg total) by mouth daily, Disp: 30 tablet, Rfl: 3  •  Symbicort 160-4.5 MCG/ACT inhaler, , Disp: , Rfl:     Allergies   Allergen Reactions   • Imitrex [Sumatriptan] Anaphylaxis   • Penicillins Anaphylaxis   • Codeine GI Intolerance   • Doxycycline GI Intolerance   • Zithromax [Azithromycin] GI Intolerance   • Amitriptyline Palpitations     OB History    Para Term  AB Living   3 2 2     2   SAB IAB Ectopic Multiple Live Births           2      # Outcome Date GA Lbr Art/2nd Weight Sex Delivery Anes PTL Lv   3             2 Term 20 37w3d / 00:06 2790 g (6 lb 2.4 oz) M Vag-Spont EPI  NICOLA   1 Term 17 39w0d  3685 g (8 lb 2 oz) M Vag-Spont EPI  NICOLA       Vitals:    23 1426   BP: 100/64   BP Location: Left arm   Patient Position: Sitting   Cuff Size: Standard   Weight: 67.9 kg (149 lb 9.6 oz)   Height: 5' 4" (1.626 m)     Body mass index is 25.68 kg/m². Patient's last menstrual period was 2022 (exact date). Review of Systems   Constitutional: Negative for chills, fatigue, fever and unexpected weight change. Respiratory: Negative for shortness of breath. Gastrointestinal: Negative for anal bleeding, blood in stool, constipation and diarrhea. Genitourinary: Negative for difficulty urinating, dysuria and hematuria. Physical Exam   Constitutional: She appears well-developed and well-nourished. No distress. Alert and oriented. HENT: atraumatic  Head: Normocephalic. Neck: Normal range of motion. Neck supple. Pulmonary: Effort normal.  Abdominal: Soft. Pelvic exam was performed with patient supine. No labial fusion. There is no rash, tenderness, lesion or injury on the right labia. There is no rash, tenderness, lesion or injury on the left labia. Urethral meatus does not show any tenderness, inflammation or discharge. Palpation of midline bladder without pain or discomfort. Uterus and Cervix are absent. Right adnexum displays no mass, no tenderness and no fullness. Left adnexum displays no mass, no tenderness and no fullness. No erythema or tenderness in the vagina. No foreign body in the vagina. No signs of injury around the vagina. Vaginal discharge found. Perineum and anus without areas of injury. No lesions noted or swelling. Lymphadenopathy:        Right: No inguinal adenopathy present. Left: No inguinal adenopathy present.      Wet mount showed negative for hyphae, ph 5, no wbcs or trich, whiff neg +Clue Cells

## 2023-08-19 LAB
C TRACH DNA SPEC QL NAA+PROBE: NEGATIVE
N GONORRHOEA DNA SPEC QL NAA+PROBE: NEGATIVE

## 2023-09-14 ENCOUNTER — TELEPHONE (OUTPATIENT)
Dept: OBGYN CLINIC | Facility: MEDICAL CENTER | Age: 28
End: 2023-09-14

## 2023-09-14 NOTE — TELEPHONE ENCOUNTER
----- Message from Claudia Kilpatrick, [address] [facility] sent at 9/13/2023  3:33 PM EDT -----  Please call this patient to notify her of her GC/CT results which were not viewed in [address] [place] [address]. Thanks.

## 2023-09-21 ENCOUNTER — TELEPHONE (OUTPATIENT)
Age: 28
End: 2023-09-21

## 2023-09-21 NOTE — TELEPHONE ENCOUNTER
----- Message from Lana Chaudhari, [address] [facility] sent at 9/13/2023  3:33 PM EDT -----  Please call this patient to notify her of her GC/CT results which were not viewed in [address] [place] [address]. Thanks.

## 2023-11-01 ENCOUNTER — APPOINTMENT (EMERGENCY)
Dept: CT IMAGING | Facility: HOSPITAL | Age: 28
End: 2023-11-01
Payer: COMMERCIAL

## 2023-11-01 ENCOUNTER — HOSPITAL ENCOUNTER (EMERGENCY)
Facility: HOSPITAL | Age: 28
Discharge: HOME | End: 2023-11-01
Attending: EMERGENCY MEDICINE
Payer: COMMERCIAL

## 2023-11-01 VITALS
DIASTOLIC BLOOD PRESSURE: 64 MMHG | BODY MASS INDEX: 25.61 KG/M2 | HEIGHT: 64 IN | SYSTOLIC BLOOD PRESSURE: 129 MMHG | OXYGEN SATURATION: 99 % | WEIGHT: 150 LBS | HEART RATE: 88 BPM | TEMPERATURE: 97.4 F | RESPIRATION RATE: 20 BRPM

## 2023-11-01 DIAGNOSIS — N76.0 VAGINITIS: Primary | ICD-10-CM

## 2023-11-01 DIAGNOSIS — K52.9 COLITIS: ICD-10-CM

## 2023-11-01 LAB
ALBUMIN SERPL BCP-MCNC: 4.5 G/DL (ref 3.5–5)
ALP SERPL-CCNC: 68 U/L (ref 34–104)
ALT SERPL W P-5'-P-CCNC: 8 U/L (ref 7–52)
ANION GAP SERPL CALCULATED.3IONS-SCNC: 5 MMOL/L
AST SERPL W P-5'-P-CCNC: 10 U/L (ref 13–39)
BASOPHILS # BLD AUTO: 0.05 THOUSANDS/ÂΜL (ref 0–0.1)
BASOPHILS NFR BLD AUTO: 1 % (ref 0–1)
BILIRUB SERPL-MCNC: 0.31 MG/DL (ref 0.2–1)
BILIRUB UR QL STRIP: NEGATIVE
BUN SERPL-MCNC: 14 MG/DL (ref 5–25)
CALCIUM SERPL-MCNC: 9.2 MG/DL (ref 8.4–10.2)
CHLORIDE SERPL-SCNC: 108 MMOL/L (ref 96–108)
CLARITY UR: CLEAR
CO2 SERPL-SCNC: 26 MMOL/L (ref 21–32)
COLOR UR: NORMAL
CREAT SERPL-MCNC: 0.64 MG/DL (ref 0.6–1.3)
EOSINOPHIL # BLD AUTO: 0.14 THOUSAND/ÂΜL (ref 0–0.61)
EOSINOPHIL NFR BLD AUTO: 1 % (ref 0–6)
ERYTHROCYTE [DISTWIDTH] IN BLOOD BY AUTOMATED COUNT: 11.6 % (ref 11.6–15.1)
EXT PREGNANCY TEST URINE: NEGATIVE
EXT. CONTROL: NORMAL
GFR SERPL CREATININE-BSD FRML MDRD: 120 ML/MIN/1.73SQ M
GLUCOSE SERPL-MCNC: 90 MG/DL (ref 65–140)
GLUCOSE UR STRIP-MCNC: NEGATIVE MG/DL
HCT VFR BLD AUTO: 40.3 % (ref 34.8–46.1)
HGB BLD-MCNC: 13.8 G/DL (ref 11.5–15.4)
HGB UR QL STRIP.AUTO: NEGATIVE
IMM GRANULOCYTES # BLD AUTO: 0.04 THOUSAND/UL (ref 0–0.2)
IMM GRANULOCYTES NFR BLD AUTO: 0 % (ref 0–2)
KETONES UR STRIP-MCNC: NEGATIVE MG/DL
LEUKOCYTE ESTERASE UR QL STRIP: NEGATIVE
LYMPHOCYTES # BLD AUTO: 2.42 THOUSANDS/ÂΜL (ref 0.6–4.47)
LYMPHOCYTES NFR BLD AUTO: 23 % (ref 14–44)
MCH RBC QN AUTO: 31.1 PG (ref 26.8–34.3)
MCHC RBC AUTO-ENTMCNC: 34.2 G/DL (ref 31.4–37.4)
MCV RBC AUTO: 91 FL (ref 82–98)
MONOCYTES # BLD AUTO: 0.46 THOUSAND/ÂΜL (ref 0.17–1.22)
MONOCYTES NFR BLD AUTO: 4 % (ref 4–12)
NEUTROPHILS # BLD AUTO: 7.23 THOUSANDS/ÂΜL (ref 1.85–7.62)
NEUTS SEG NFR BLD AUTO: 71 % (ref 43–75)
NITRITE UR QL STRIP: NEGATIVE
NRBC BLD AUTO-RTO: 0 /100 WBCS
PH UR STRIP.AUTO: 6.5 [PH]
PLATELET # BLD AUTO: 303 THOUSANDS/UL (ref 149–390)
PMV BLD AUTO: 9.8 FL (ref 8.9–12.7)
POTASSIUM SERPL-SCNC: 4 MMOL/L (ref 3.5–5.3)
PROT SERPL-MCNC: 7.4 G/DL (ref 6.4–8.4)
PROT UR STRIP-MCNC: NEGATIVE MG/DL
RBC # BLD AUTO: 4.44 MILLION/UL (ref 3.81–5.12)
SODIUM SERPL-SCNC: 139 MMOL/L (ref 135–147)
SP GR UR STRIP.AUTO: 1.03 (ref 1–1.03)
UROBILINOGEN UR STRIP-ACNC: <2 MG/DL
WBC # BLD AUTO: 10.34 THOUSAND/UL (ref 4.31–10.16)

## 2023-11-01 PROCEDURE — 81003 URINALYSIS AUTO W/O SCOPE: CPT | Performed by: EMERGENCY MEDICINE

## 2023-11-01 PROCEDURE — 85025 COMPLETE CBC W/AUTO DIFF WBC: CPT | Performed by: EMERGENCY MEDICINE

## 2023-11-01 PROCEDURE — 81025 URINE PREGNANCY TEST: CPT | Performed by: EMERGENCY MEDICINE

## 2023-11-01 PROCEDURE — 99284 EMERGENCY DEPT VISIT MOD MDM: CPT

## 2023-11-01 PROCEDURE — 36415 COLL VENOUS BLD VENIPUNCTURE: CPT | Performed by: EMERGENCY MEDICINE

## 2023-11-01 PROCEDURE — 74177 CT ABD & PELVIS W/CONTRAST: CPT

## 2023-11-01 PROCEDURE — 99284 EMERGENCY DEPT VISIT MOD MDM: CPT | Performed by: EMERGENCY MEDICINE

## 2023-11-01 PROCEDURE — 80053 COMPREHEN METABOLIC PANEL: CPT | Performed by: EMERGENCY MEDICINE

## 2023-11-01 RX ORDER — METRONIDAZOLE 500 MG/1
500 TABLET ORAL EVERY 12 HOURS SCHEDULED
Qty: 14 TABLET | Refills: 0 | Status: SHIPPED | OUTPATIENT
Start: 2023-11-01 | End: 2023-11-08

## 2023-11-01 RX ADMIN — IOHEXOL 100 ML: 350 INJECTION, SOLUTION INTRAVENOUS at 15:22

## 2023-11-01 NOTE — ED PROVIDER NOTES
History  Chief Complaint   Patient presents with    Flank Pain     Left sided kidney pain, vaginal area is "leaking", and "large white clots coming out". HPI  76-year-old female presents with left flank pain that started this morning. She states it is sharp, worse with movement, constant. She also reports it feels like prior BV with fishy odor. She states she has MetroGel that she was previously prescribed by her GYN for this but has not taken it. She denies any exposure to STDs. Denies dysuria. Prior to Admission Medications   Prescriptions Last Dose Informant Patient Reported? Taking?    Symbicort 160-4.5 MCG/ACT inhaler   Yes No   acetaminophen (TYLENOL) 650 mg CR tablet  Self No No   Sig: Take 1 tablet (650 mg total) by mouth every 8 (eight) hours as needed for mild pain   albuterol (Proventil HFA) 90 mcg/act inhaler  Self No No   Sig: Inhale 2 puffs every 6 (six) hours as needed for wheezing or shortness of breath   dicyclomine (BENTYL) 20 mg tablet  Self No No   Sig: Take 1 tablet (20 mg total) by mouth every 6 (six) hours as needed (abdominal pain)   estrogens, conjugated (Premarin) vaginal cream   No No   Sig: Insert 0.5 g into the vagina 2 (two) times a week   ibuprofen (MOTRIN) 600 mg tablet  Self No No   Sig: Take 1 tablet (600 mg total) by mouth every 6 (six) hours as needed for mild pain   melatonin 1 mg  Self Yes No   Sig: Take 1 mg by mouth daily at bedtime   pantoprazole (PROTONIX) 40 mg tablet  Self No No   Sig: Take 1 tablet (40 mg total) by mouth daily      Facility-Administered Medications: None       Past Medical History:   Diagnosis Date    ADHD     Anxiety     Asthma     cold weather, exercise induced    GERD (gastroesophageal reflux disease)     Irritable bowel syndrome     Migraines        Past Surgical History:   Procedure Laterality Date    CHOLECYSTECTOMY LAPAROSCOPIC N/A 8/2/2023    Procedure: CHOLECYSTECTOMY LAPAROSCOPIC;  Surgeon: Fadia Esquivel MD;  Location: [facility]; Service: General    CHROMOSOME ANALYSIS, PRODUCTS OF CONCEPTION (HISTORICAL)      COLONOSCOPY      DILATION AND CURETTAGE OF UTERUS      d&E,     HYSTERECTOMY N/A     DE COLONOSCOPY FLX DX W/COLLJ SPEC WHEN PFRMD N/A 10/31/2018    Procedure: EGD AND COLONOSCOPY;  Surgeon: Nicholas Fountain MD;  Location: [facility]; Service: Gastroenterology    DE DILATION & CURETTAGE DX&/THER NONOBSTETRIC N/A 2022    Procedure: D&C Suction;  Surgeon: Russel Shoemaker MD;  Location: [facility];  Service: Gynecology    DE VAG HYST 250 GM/< W/RMVL TUBE&/OVARY Bilateral 2022    Procedure: TOTAL VAGINAL HYSTERECTOMY BILATERAL SALPINGECTOMY RIGHT OOPHORECTOMY;  Surgeon: Caroline Cohn MD;  Location:  MAIN OR;  Service: Gynecology    SOFT TISSUE MASS EXCISION      TONSILECTOMY, ADENOIDECTOMY, BILATERAL MYRINGOTOMY AND TUBES      WISDOM TOOTH EXTRACTION         Family History   Problem Relation Age of Onset    Diabetes Mother     Hypertension Father     Heart disease Father     Stroke Father      I have reviewed and agree with the history as documented. E-Cigarette/Vaping    E-Cigarette Use Never User      E-Cigarette/Vaping Substances    Nicotine No     THC No     CBD No     Flavoring No     Other No     Unknown No      Social History     Tobacco Use    Smoking status: Every Day     Packs/day: 0.25     Types: Cigarettes    Smokeless tobacco: Never   Vaping Use    Vaping Use: Never used   Substance Use Topics    Alcohol use: No     Comment: rarely    Drug use: No       Review of Systems   Constitutional:  Negative for chills and fever. HENT:  Negative for dental problem and ear pain. Eyes:  Negative for pain and redness. Respiratory:  Negative for cough and shortness of breath. Cardiovascular:  Negative for chest pain and palpitations. Gastrointestinal:  Negative for abdominal pain and nausea. Endocrine: Negative for polydipsia and polyphagia.    Genitourinary:  Positive for flank pain and vaginal discharge. Negative for dysuria and frequency. Musculoskeletal:  Negative for arthralgias and joint swelling. Skin:  Negative for color change and rash. Neurological:  Negative for dizziness and headaches. Psychiatric/Behavioral:  Negative for behavioral problems and confusion. All other systems reviewed and are negative. Physical Exam  Physical Exam  Vitals and nursing note reviewed. Constitutional:       General: She is not in acute distress. Appearance: She is well-developed. She is not diaphoretic. HENT:      Head: Atraumatic. Right Ear: External ear normal.      Left Ear: External ear normal.      Nose: Nose normal.   Eyes:      Conjunctiva/sclera: Conjunctivae normal.      Pupils: Pupils are equal, round, and reactive to light. Neck:      Vascular: No JVD. Cardiovascular:      Rate and Rhythm: Normal rate and regular rhythm. Heart sounds: Normal heart sounds. No murmur heard. Pulmonary:      Effort: Pulmonary effort is normal. No respiratory distress. Breath sounds: Normal breath sounds. No wheezing. Abdominal:      General: Bowel sounds are normal. There is no distension. Palpations: Abdomen is soft. Tenderness: There is no abdominal tenderness. Musculoskeletal:         General: Normal range of motion. Cervical back: Normal range of motion and neck supple. Comments: L CVA tenderness   Skin:     General: Skin is warm and dry. Capillary Refill: Capillary refill takes less than 2 seconds. Neurological:      Mental Status: She is alert and oriented to person, place, and time. Cranial Nerves: No cranial nerve deficit.    Psychiatric:         Behavior: Behavior normal.         Vital Signs  ED Triage Vitals [11/01/23 1412]   Temperature Pulse Respirations Blood Pressure SpO2   (!) 97.4 °F (36.3 °C) 88 20 129/64 99 %      Temp Source Heart Rate Source Patient Position - Orthostatic VS BP Location FiO2 (%)   Temporal Monitor Sitting Left arm --      Pain Score       --           Vitals:    11/01/23 1412   BP: 129/64   Pulse: 88   Patient Position - Orthostatic VS: Sitting         Visual Acuity      ED Medications  Medications   iohexol (OMNIPAQUE) 350 MG/ML injection (MULTI-DOSE) 100 mL (100 mL Intravenous Given 11/1/23 1522)       Diagnostic Studies  Results Reviewed       Procedure Component Value Units Date/Time    Comprehensive metabolic panel [[id number]]  (Abnormal) Collected: 11/01/23 1433    Lab Status: Final result Specimen: Blood from Arm, Left Updated: 11/01/23 1502     Sodium 139 mmol/L      Potassium 4.0 mmol/L      Chloride 108 mmol/L      CO2 26 mmol/L      ANION GAP 5 mmol/L      BUN 14 mg/dL      Creatinine 0.64 mg/dL      Glucose 90 mg/dL      Calcium 9.2 mg/dL      AST 10 U/L      ALT 8 U/L      Alkaline Phosphatase 68 U/L      Total Protein 7.4 g/dL      Albumin 4.5 g/dL      Total Bilirubin 0.31 mg/dL      eGFR 120 ml/min/1.73sq m     Narrative:      [place] guidelines for Chronic Kidney Disease (CKD):     Stage 1 with normal or high GFR (GFR > 90 mL/min/1.73 square meters)    Stage 2 Mild CKD (GFR = 60-89 mL/min/1.73 square meters)    Stage 3A Moderate CKD (GFR = 45-59 mL/min/1.73 square meters)    Stage 3B Moderate CKD (GFR = 30-44 mL/min/1.73 square meters)    Stage 4 Severe CKD (GFR = 15-29 mL/min/1.73 square meters)    Stage 5 End Stage CKD (GFR <15 mL/min/1.73 square meters)  Note: GFR calculation is accurate only with a steady state creatinine    UA w Reflex to Microscopic w Reflex to Culture [[id number]] Collected: 11/01/23 1437    Lab Status: Final result Specimen: Urine, Clean Catch Updated: 11/01/23 1445     Color, UA Light Yellow     Clarity, UA Clear     Specific Gravity, UA 1.027     pH, UA 6.5     Leukocytes, UA Negative     Nitrite, UA Negative     Protein, UA Negative mg/dl      Glucose, UA Negative mg/dl      Ketones, UA Negative mg/dl      Urobilinogen, UA <2.0 mg/dl Bilirubin, UA Negative     Occult Blood, UA Negative    CBC and differential [[id number]]  (Abnormal) Collected: 11/01/23 1433    Lab Status: Final result Specimen: Blood from Arm, Left Updated: 11/01/23 1443     WBC 10.34 Thousand/uL      RBC 4.44 Million/uL      Hemoglobin 13.8 g/dL      Hematocrit 40.3 %      MCV 91 fL      MCH 31.1 pg      MCHC 34.2 g/dL      RDW 11.6 %      MPV 9.8 fL      Platelets 050 Thousands/uL      nRBC 0 /100 WBCs      Neutrophils Relative 71 %      Immat GRANS % 0 %      Lymphocytes Relative 23 %      Monocytes Relative 4 %      Eosinophils Relative 1 %      Basophils Relative 1 %      Neutrophils Absolute 7.23 Thousands/µL      Immature Grans Absolute 0.04 Thousand/uL      Lymphocytes Absolute 2.42 Thousands/µL      Monocytes Absolute 0.46 Thousand/µL      Eosinophils Absolute 0.14 Thousand/µL      Basophils Absolute 0.05 Thousands/µL     POCT pregnancy, urine [[id number]]  (Normal) Resulted: 11/01/23 1439    Lab Status: Final result Updated: 11/01/23 1439     EXT Preg Test, Ur Negative     Control Valid                   CT abdomen pelvis with contrast   Final Result by Frantz Carrera MD (11/01 1625)      Findings suggestive of mild colitis involving the distal transverse, descending, and sigmoid colon. Recommend correlation with patient symptoms. No pneumatosis or free air. Thickening and increased enhancement of the vaginal cuff. Correlate with any symptoms of vaginitis. Workstation performed: [id number]                    Procedures  Procedures         ED Course                               SBIRT 20yo+      Flowsheet Row Most Recent Value   Initial Alcohol Screen: US AUDIT-C     1. How often do you have a drink containing alcohol? 0 Filed at: 11/01/2023 1413   2. How many drinks containing alcohol do you have on a typical day you are drinking? 0 Filed at: 11/01/2023 1413   3a. Male UNDER 65: How often do you have five or more drinks on one occasion?  0 Filed at: 11/01/2023 1413   3b. FEMALE Any Age, or MALE 65+: How often do you have 4 or more drinks on one occassion? 0 Filed at: 11/01/2023 1413   Audit-C Score 0 Filed at: 11/01/2023 1413   GALE: How many times in the past year have you. .. Used an illegal drug or used a prescription medication for non-medical reasons? Never Filed at: 11/01/2023 1413                      Medical Decision Making  Amount and/or Complexity of Data Reviewed  Labs: ordered. Radiology: ordered. Risk  Prescription drug management. Patient presents with symptoms consistent with BV in addition to flank pain. CT consistent with colitis in addition to vaginitis. Will treat with flagyl. Appears well, nontoxic, in NAD. Recommend PCP and GYN follow up. Disposition  Final diagnoses:   Vaginitis   Colitis     Time reflects when diagnosis was documented in both MDM as applicable and the Disposition within this note       Time User Action Codes Description Comment    11/1/2023  4:36 PM Paola Galeazzi Add [N76.0] Vaginitis     11/1/2023  4:36 PM Paola Galeazzi Add [K52.9] Colitis           ED Disposition       ED Disposition   Discharge    Condition   Stable    Date/Time   Wed Nov 1, 2023 2800 [facility] discharge to home/self care.                    Follow-up Information       Follow up With Specialties Details Why Contact Info    Clarke Askew MD Obstetrics and Gynecology, Obstetrics, Gynecology Call  for GYN follow up [address] [facility] [address] [facility]              Discharge Medication List as of 11/1/2023  4:41 PM        START taking these medications    Details   metroNIDAZOLE (FLAGYL) 500 mg tablet Take 1 tablet (500 mg total) by mouth every 12 (twelve) hours for 7 days, Starting Wed 11/1/2023, Until Wed 11/8/2023, Normal           CONTINUE these medications which have NOT CHANGED    Details   acetaminophen (TYLENOL) 650 mg CR tablet Take 1 tablet (650 mg total) by mouth every 8 (eight) hours as needed for mild pain, Starting Wed 11/9/2022, Normal      albuterol (Proventil HFA) 90 mcg/act inhaler Inhale 2 puffs every 6 (six) hours as needed for wheezing or shortness of breath, Starting Thu 12/8/2022, Normal      dicyclomine (BENTYL) 20 mg tablet Take 1 tablet (20 mg total) by mouth every 6 (six) hours as needed (abdominal pain), Starting Fri 7/7/2023, Normal      estrogens, conjugated (Premarin) vaginal cream Insert 0.5 g into the vagina 2 (two) times a week, Starting Thu 8/17/2023, Until Sat 9/16/2023, Normal      ibuprofen (MOTRIN) 600 mg tablet Take 1 tablet (600 mg total) by mouth every 6 (six) hours as needed for mild pain, Starting Wed 11/9/2022, Normal      melatonin 1 mg Take 1 mg by mouth daily at bedtime, Historical Med      pantoprazole (PROTONIX) 40 mg tablet Take 1 tablet (40 mg total) by mouth daily, Starting Fri 7/7/2023, Normal      Symbicort 160-4.5 MCG/ACT inhaler Starting Wed 8/16/2023, Historical Med             No discharge procedures on file.     PDMP Review       None            ED Provider  Electronically Signed by             Yonis Boothe MD  11/01/23 3291

## 2024-01-30 ENCOUNTER — HOSPITAL ENCOUNTER (EMERGENCY)
Facility: HOSPITAL | Age: 29
Discharge: HOME | End: 2024-01-30
Attending: EMERGENCY MEDICINE
Payer: COMMERCIAL

## 2024-01-30 ENCOUNTER — APPOINTMENT (EMERGENCY)
Dept: CT IMAGING | Facility: HOSPITAL | Age: 29
End: 2024-01-30
Payer: COMMERCIAL

## 2024-01-30 VITALS
RESPIRATION RATE: 17 BRPM | DIASTOLIC BLOOD PRESSURE: 87 MMHG | OXYGEN SATURATION: 100 % | HEART RATE: 78 BPM | SYSTOLIC BLOOD PRESSURE: 132 MMHG | TEMPERATURE: 98.4 F

## 2024-01-30 DIAGNOSIS — L03.213 PRESEPTAL CELLULITIS OF RIGHT EYE: Primary | ICD-10-CM

## 2024-01-30 LAB
EXT PREGNANCY TEST URINE: NEGATIVE
EXT. CONTROL: NORMAL

## 2024-01-30 PROCEDURE — 99285 EMERGENCY DEPT VISIT HI MDM: CPT | Performed by: EMERGENCY MEDICINE

## 2024-01-30 PROCEDURE — 81025 URINE PREGNANCY TEST: CPT

## 2024-01-30 PROCEDURE — 99284 EMERGENCY DEPT VISIT MOD MDM: CPT

## 2024-01-30 PROCEDURE — 70487 CT MAXILLOFACIAL W/DYE: CPT

## 2024-01-30 PROCEDURE — NC001 PR NO CHARGE: Performed by: EMERGENCY MEDICINE

## 2024-01-30 RX ORDER — CEFDINIR 300 MG/1
300 CAPSULE ORAL EVERY 12 HOURS SCHEDULED
Qty: 20 CAPSULE | Refills: 0 | Status: SHIPPED | OUTPATIENT
Start: 2024-01-30 | End: 2024-02-09

## 2024-01-30 RX ORDER — CLINDAMYCIN HYDROCHLORIDE 150 MG/1
300 CAPSULE ORAL ONCE
Status: COMPLETED | OUTPATIENT
Start: 2024-01-30 | End: 2024-01-30

## 2024-01-30 RX ORDER — CEFDINIR 300 MG/1
300 CAPSULE ORAL EVERY 12 HOURS SCHEDULED
Status: DISCONTINUED | OUTPATIENT
Start: 2024-01-30 | End: 2024-01-30 | Stop reason: HOSPADM

## 2024-01-30 RX ORDER — IBUPROFEN 600 MG/1
600 TABLET ORAL ONCE
Status: COMPLETED | OUTPATIENT
Start: 2024-01-30 | End: 2024-01-30

## 2024-01-30 RX ORDER — HYDROCODONE BITARTRATE AND ACETAMINOPHEN 5; 325 MG/1; MG/1
1 TABLET ORAL EVERY 6 HOURS PRN
Qty: 12 TABLET | Refills: 0 | Status: SHIPPED | OUTPATIENT
Start: 2024-01-30

## 2024-01-30 RX ORDER — TETRACAINE HYDROCHLORIDE 5 MG/ML
1 SOLUTION OPHTHALMIC ONCE
Status: COMPLETED | OUTPATIENT
Start: 2024-01-30 | End: 2024-01-30

## 2024-01-30 RX ORDER — CLINDAMYCIN HYDROCHLORIDE 300 MG/1
300 CAPSULE ORAL 3 TIMES DAILY
Qty: 30 CAPSULE | Refills: 0 | Status: SHIPPED | OUTPATIENT
Start: 2024-01-30 | End: 2024-02-09

## 2024-01-30 RX ORDER — CLINDAMYCIN PHOSPHATE 600 MG/50ML
600 INJECTION, SOLUTION INTRAVENOUS ONCE
Status: DISCONTINUED | OUTPATIENT
Start: 2024-01-30 | End: 2024-01-30

## 2024-01-30 RX ADMIN — IOHEXOL 85 ML: 350 INJECTION, SOLUTION INTRAVENOUS at 07:28

## 2024-01-30 RX ADMIN — CEFDINIR 300 MG: 300 CAPSULE ORAL at 08:46

## 2024-01-30 RX ADMIN — FLUORESCEIN SODIUM 1 STRIP: 1 STRIP OPHTHALMIC at 07:17

## 2024-01-30 RX ADMIN — IBUPROFEN 600 MG: 600 TABLET, FILM COATED ORAL at 06:25

## 2024-01-30 RX ADMIN — TETRACAINE HYDROCHLORIDE 1 DROP: 5 SOLUTION OPHTHALMIC at 07:17

## 2024-01-30 RX ADMIN — CLINDAMYCIN HYDROCHLORIDE 300 MG: 150 CAPSULE ORAL at 08:46

## 2024-01-30 NOTE — Clinical Note
Erick Wong was seen and treated in our emergency department on 1/30/2024.                Diagnosis:     Erick  may return to work on return date.    She may return on this date: 02/02/2024         If you have any questions or concerns, please don't hesitate to call.      CALIXTO Gonzalez    ______________________________           _______________          _______________  Hospital Representative                              Date                                Time

## 2024-01-30 NOTE — ED PROVIDER NOTES
Emergency Department Sign Out Note        Sign out and transfer of care from [facility]. See Separate Emergency Department note.     The patient, Erick Wong, was evaluated by the previous provider for Right eye pain.    Workup Completed:  Labs, Staining    ED Course / Workup Pending (followup):  Ct for cellulitis                                     Procedures  Medical Decision Making  CT demonstrating preseptal cellulitis no deep structure involvement.  Will begin outpatient antibiotic therapy with clindamycin and cefdinir.  Return precautions discussed.    Amount and/or Complexity of Data Reviewed  Labs: ordered.  Radiology: ordered.    Risk  Prescription drug management.            Disposition  Final diagnoses:   Preseptal cellulitis of right eye     Time reflects when diagnosis was documented in both MDM as applicable and the Disposition within this note       Time User Action Codes Description Comment    1/30/2024  8:35 AM Bjorn Harper Add [L03.213] Preseptal cellulitis of right eye           ED Disposition       ED Disposition   Discharge    Condition   Stable    Date/Time   Tue Jan 30, 2024  8:35 AM    Comment   Erick Wong discharge to home/self care.                   Follow-up Information       Follow up With Specialties Details Why Contact Info Additional Information    [facility] Emergency Department Emergency Medicine  If your symptoms worsen, or you are not improving. [address] [facility] [address]  [phone] [facility] Emergency Department, [address] [place], [place], [address]    [facility] Ophthalmology Schedule an appointment as soon as possible for a visit  For Recheck [address]  [address]  [facility] [address] [phone]             Patient's Medications   Discharge Prescriptions    CEFDINIR (OMNICEF) 300 MG CAPSULE    Take 1 capsule (300 mg total) by mouth every 12 (twelve) hours for  10 days       Start Date: 1/30/2024 End Date: 2/9/2024       Order Dose: 300 mg       Quantity: 20 capsule    Refills: 0    CLINDAMYCIN (CLEOCIN) 300 MG CAPSULE    Take 1 capsule (300 mg total) by mouth 3 (three) times a day for 10 days       Start Date: 1/30/2024 End Date: 2/9/2024       Order Dose: 300 mg       Quantity: 30 capsule    Refills: 0    HYDROCODONE-ACETAMINOPHEN (NORCO) 5-325 MG PER TABLET    Take 1 tablet by mouth every 6 (six) hours as needed for pain for up to 12 doses Max Daily Amount: 4 tablets       Start Date: 1/30/2024 End Date: --       Order Dose: 1 tablet       Quantity: 12 tablet    Refills: 0     No discharge procedures on file.       ED Provider  Electronically Signed by     CALIXTO Gonzalez  01/30/24 0838

## 2024-01-30 NOTE — ED PROVIDER NOTES
History  Chief Complaint   Patient presents with    Eye Pain     Pt arrives via EMS for R sided eye pain, redness and swelling x 2 days. Pt states tried warm compresses at home without relief. Also states seen at eye doctor yesterday, started on oral abx.      The patient is a 28 y.o. female with a history of diabetes, hypertension, heart disease who presents to [facility] with a chief complaint of right arm pain and swelling.  Patient reports that she was seen an eye doctor yesterday for a stye.  Patient reports that they did a procedure to remove the stye with meiboflow treatment.  States that they then drained some fluid out.  She states her eye improved and felt better.  However overnight she developed more swelling and pain around the eye.  Patient is endorsing photophobia, pain with EOMs, and swelling.  Patient denies fever, chills, night sweats, chest pain, shortness of breath, headache, nausea, vomiting. Patient is concerned for orbital cellulitis. Eye looks like post-procedure swelling.           History provided by:  Patient   used: No    Eye Pain  Associated symptoms: no abdominal pain, no chest pain, no cough, no ear pain, no fever, no rash, no shortness of breath, no sore throat and no vomiting        Prior to Admission Medications   Prescriptions Last Dose Informant Patient Reported? Taking?   Symbicort 160-4.5 MCG/ACT inhaler   Yes No   acetaminophen (TYLENOL) 650 mg CR tablet  Self No No   Sig: Take 1 tablet (650 mg total) by mouth every 8 (eight) hours as needed for mild pain   albuterol (Proventil HFA) 90 mcg/act inhaler  Self No No   Sig: Inhale 2 puffs every 6 (six) hours as needed for wheezing or shortness of breath   dicyclomine (BENTYL) 20 mg tablet  Self No No   Sig: Take 1 tablet (20 mg total) by mouth every 6 (six) hours as needed (abdominal pain)   estrogens, conjugated (Premarin) vaginal cream   No No   Sig: Insert 0.5 g into the vagina 2 (two) times a  week   ibuprofen (MOTRIN) 600 mg tablet  Self No No   Sig: Take 1 tablet (600 mg total) by mouth every 6 (six) hours as needed for mild pain   melatonin 1 mg  Self Yes No   Sig: Take 1 mg by mouth daily at bedtime   pantoprazole (PROTONIX) 40 mg tablet  Self No No   Sig: Take 1 tablet (40 mg total) by mouth daily      Facility-Administered Medications: None       Past Medical History:   Diagnosis Date    ADHD     Anxiety     Asthma     cold weather, exercise induced    GERD (gastroesophageal reflux disease)     Irritable bowel syndrome     Migraines        Past Surgical History:   Procedure Laterality Date    CHOLECYSTECTOMY LAPAROSCOPIC N/A 2023    Procedure: CHOLECYSTECTOMY LAPAROSCOPIC;  Surgeon: Pascale Young MD;  Location: [facility];  Service: General    CHROMOSOME ANALYSIS, PRODUCTS OF CONCEPTION (HISTORICAL)      COLONOSCOPY      DILATION AND CURETTAGE OF UTERUS      d&E,     HYSTERECTOMY N/A     MS COLONOSCOPY FLX DX W/COLLJ SPEC WHEN PFRMD N/A 10/31/2018    Procedure: EGD AND COLONOSCOPY;  Surgeon: Stone Redman MD;  Location: [facility];  Service: Gastroenterology    MS DILATION & CURETTAGE DX&/THER NONOBSTETRIC N/A 2022    Procedure: D&C Suction;  Surgeon: Stephanie Fabian MD;  Location: [facility];  Service: Gynecology    MS VAG HYST 250 GM/< W/RMVL TUBE&/OVARY Bilateral 2022    Procedure: TOTAL VAGINAL HYSTERECTOMY BILATERAL SALPINGECTOMY RIGHT OOPHORECTOMY;  Surgeon: Mila Pinzon MD;  Location:  MAIN OR;  Service: Gynecology    SOFT TISSUE MASS EXCISION      TONSILECTOMY, ADENOIDECTOMY, BILATERAL MYRINGOTOMY AND TUBES      WISDOM TOOTH EXTRACTION         Family History   Problem Relation Age of Onset    Diabetes Mother     Hypertension Father     Heart disease Father     Stroke Father      I have reviewed and agree with the history as documented.    E-Cigarette/Vaping    E-Cigarette Use Never User      E-Cigarette/Vaping Substances    Nicotine No     THC No      CBD No     Flavoring No     Other No     Unknown No      Social History     Tobacco Use    Smoking status: Every Day     Current packs/day: 0.25     Types: Cigarettes    Smokeless tobacco: Never   Vaping Use    Vaping status: Never Used   Substance Use Topics    Alcohol use: No     Comment: rarely    Drug use: No       Review of Systems   Constitutional:  Negative for chills and fever.   HENT:  Negative for ear pain and sore throat.    Eyes:  Positive for photophobia and pain. Negative for discharge, redness, itching and visual disturbance.        Infraorbital swelling   Respiratory:  Negative for cough and shortness of breath.    Cardiovascular:  Negative for chest pain and palpitations.   Gastrointestinal:  Negative for abdominal pain and vomiting.   Genitourinary:  Negative for dysuria and hematuria.   Musculoskeletal:  Negative for arthralgias and back pain.   Skin:  Negative for color change and rash.   Neurological:  Negative for seizures and syncope.   All other systems reviewed and are negative.      Physical Exam  Physical Exam  Constitutional:       General: She is not in acute distress.     Appearance: Normal appearance. She is not ill-appearing.   HENT:      Head: Normocephalic and atraumatic.      Right Ear: Tympanic membrane normal.      Left Ear: Tympanic membrane normal.      Nose: Nose normal.      Mouth/Throat:      Mouth: Mucous membranes are moist.      Pharynx: No oropharyngeal exudate.   Eyes:      General: Lids are normal. Vision grossly intact. No allergic shiner or visual field deficit.        Right eye: No foreign body, discharge or hordeolum.      Extraocular Movements: Extraocular movements intact.      Right eye: Normal extraocular motion and no nystagmus.      Conjunctiva/sclera: Conjunctivae normal.      Right eye: Right conjunctiva is not injected. No exudate or hemorrhage.     Pupils: Pupils are equal, round, and reactive to light.      Visual Fields: Right eye visual fields  normal and left eye visual fields normal.        Comments: No corneal abrasion visualized   Cardiovascular:      Rate and Rhythm: Normal rate.      Pulses: Normal pulses.   Pulmonary:      Effort: Pulmonary effort is normal. No respiratory distress.      Breath sounds: Normal breath sounds. No stridor. No wheezing.   Abdominal:      General: Abdomen is flat.      Tenderness: There is no abdominal tenderness.   Musculoskeletal:         General: Normal range of motion.      Cervical back: Normal range of motion. No rigidity.   Skin:     General: Skin is warm and dry.      Capillary Refill: Capillary refill takes less than 2 seconds.   Neurological:      General: No focal deficit present.      Mental Status: She is alert and oriented to person, place, and time.      GCS: GCS eye subscore is 4. GCS verbal subscore is 5. GCS motor subscore is 6.      Cranial Nerves: Cranial nerves 2-12 are intact.         Vital Signs  ED Triage Vitals   Temperature Pulse Respirations Blood Pressure SpO2   01/30/24 0505 01/30/24 0505 01/30/24 0505 01/30/24 0505 01/30/24 0505   98.4 °F (36.9 °C) 78 17 132/87 100 %      Temp Source Heart Rate Source Patient Position - Orthostatic VS BP Location FiO2 (%)   01/30/24 0505 01/30/24 0505 01/30/24 0505 01/30/24 0505 --   Temporal Monitor Sitting Left arm       Pain Score       01/30/24 0625       9           Vitals:    01/30/24 0505   BP: 132/87   Pulse: 78   Patient Position - Orthostatic VS: Sitting         Visual Acuity      ED Medications  Medications   fluorescein sodium sterile ophthalmic strip 1 strip (has no administration in time range)   tetracaine 0.5 % ophthalmic solution 1 drop (has no administration in time range)   ibuprofen (MOTRIN) tablet 600 mg (600 mg Oral Given 1/30/24 0625)       Diagnostic Studies  Results Reviewed       Procedure Component Value Units Date/Time    POCT pregnancy, urine [[id number]]  (Normal) Resulted: 01/30/24 0623    Lab Status: Final result Updated:  01/30/24 0623     EXT Preg Test, Ur Negative     Control Valid                   CT facial bones with contrast    (Results Pending)              Procedures  Procedures         ED Course  ED Course as of 01/30/24 0712   Tue Jan 30, 2024   0627 PREGNANCY TEST URINE: Negative               Medical Decision Making  The patient is a 28 y.o. female with a history of diabetes, hypertension, heart disease who presents to [facility] with a chief complaint of right arm pain and swelling.   Patient having post-procedure pain.  Discussed that she will need to follow-up with her eye doctor.  Will obtain CT facial bones to rule out orbital cellulitis. Patient signed out to Bjorn Harper NP pending CT scan will star abx and admit or have her follow up out patient.     Problems Addressed:  Preseptal cellulitis of right eye: acute illness or injury    Amount and/or Complexity of Data Reviewed  Labs: ordered. Decision-making details documented in ED Course.  Radiology: ordered.    Risk  Prescription drug management.             Disposition  Final diagnoses:   None     ED Disposition       None          Follow-up Information    None         Patient's Medications   Discharge Prescriptions    No medications on file       No discharge procedures on file.    PDMP Review       None            ED Provider  Electronically Signed by             Edy Becker PA-C  01/30/24 8205

## 2024-01-30 NOTE — ED NOTES
Discharge instructions reviewed, patient has no new complaints or concerns at time of discharge.  Patient ambulated out of department, steady gait, vss.   Patient accompanied out of department by her family.      Maryanne Bishop RN  01/30/24 0854

## 2024-02-10 ENCOUNTER — OFFICE VISIT (OUTPATIENT)
Dept: URGENT CARE | Facility: CLINIC | Age: 29
End: 2024-02-10
Payer: COMMERCIAL

## 2024-02-10 VITALS
HEART RATE: 105 BPM | OXYGEN SATURATION: 98 % | DIASTOLIC BLOOD PRESSURE: 70 MMHG | RESPIRATION RATE: 18 BRPM | SYSTOLIC BLOOD PRESSURE: 108 MMHG | TEMPERATURE: 97.4 F

## 2024-02-10 DIAGNOSIS — B37.31 YEAST VAGINITIS: Primary | ICD-10-CM

## 2024-02-10 PROCEDURE — 99213 OFFICE O/P EST LOW 20 MIN: CPT | Performed by: STUDENT IN AN ORGANIZED HEALTH CARE EDUCATION/TRAINING PROGRAM

## 2024-02-10 RX ORDER — FLUCONAZOLE 150 MG/1
150 TABLET ORAL DAILY
Qty: 5 TABLET | Refills: 0 | Status: SHIPPED | OUTPATIENT
Start: 2024-02-10 | End: 2024-02-15

## 2024-02-10 RX ORDER — TOPIRAMATE 50 MG/1
50 TABLET, FILM COATED ORAL 2 TIMES DAILY
COMMUNITY
Start: 2024-02-08 | End: 2025-02-07

## 2024-02-10 NOTE — PROGRESS NOTES
[facility]        NAME: Erick Wong is a 28 y.o. female  : 1995    MRN: [id number]  DATE: February 10, 2024  TIME: 8:31 AM    Assessment and Plan   Yeast vaginitis [B37.31]  1. Yeast vaginitis  fluconazole (DIFLUCAN) 150 mg tablet            Patient Instructions   Presentation c/w yeast vaginitis. Will tx with diflucan. Fu with Gyn this week.    Follow up with PCP in 3-5 days.  Proceed to  ER if symptoms worsen.    Chief Complaint     Chief Complaint   Patient presents with    Vaginitis     Pt c/o yeast infection that started 4 days ago from being on antibiotics from different infection. Has been taking azo and diflucan for 2 days. Itching and burning around/in vaginal area         History of Present Illness       Vaginal Itching  The patient's primary symptoms include genital itching and a genital rash. The patient's pertinent negatives include no genital lesions, genital odor or pelvic pain. This is a new problem. The current episode started in the past 7 days. The problem occurs constantly. The problem has been unchanged. The pain is moderate. Pertinent negatives include no abdominal pain, chills, fever, hematuria, urgency or vomiting. Treatments tried: 2 diflucan pills, some relief, also azo yeast with some relief. She is sexually active. It is unknown whether or not her partner has an STD. Her past medical history is significant for a gynecological surgery.   hysterectomy, recurrent yeast and BV infections, recent use of multiple antibiotics this past week for tx of cellulitis, knows this is yeast infection as she has had before     Review of Systems   Review of Systems   Constitutional:  Negative for chills and fever.   Gastrointestinal:  Negative for abdominal pain and vomiting.   Genitourinary:  Positive for vaginal pain. Negative for decreased urine volume, hematuria, pelvic pain and urgency.         Current Medications       Current Outpatient Medications:     acetaminophen  (TYLENOL) 650 mg CR tablet, Take 1 tablet (650 mg total) by mouth every 8 (eight) hours as needed for mild pain, Disp: 30 tablet, Rfl: 0    albuterol (Proventil HFA) 90 mcg/act inhaler, Inhale 2 puffs every 6 (six) hours as needed for wheezing or shortness of breath, Disp: 8 g, Rfl: 1    dicyclomine (BENTYL) 20 mg tablet, Take 1 tablet (20 mg total) by mouth every 6 (six) hours as needed (abdominal pain), Disp: 30 tablet, Rfl: 3    fluconazole (DIFLUCAN) 150 mg tablet, Take 1 tablet (150 mg total) by mouth daily for 5 doses, Disp: 5 tablet, Rfl: 0    ibuprofen (MOTRIN) 600 mg tablet, Take 1 tablet (600 mg total) by mouth every 6 (six) hours as needed for mild pain, Disp: 30 tablet, Rfl: 0    melatonin 1 mg, Take 1 mg by mouth daily at bedtime, Disp: , Rfl:     pantoprazole (PROTONIX) 40 mg tablet, Take 1 tablet (40 mg total) by mouth daily, Disp: 30 tablet, Rfl: 3    Symbicort 160-4.5 MCG/ACT inhaler, , Disp: , Rfl:     topiramate (TOPAMAX) 50 MG tablet, Take 50 mg by mouth 2 (two) times a day, Disp: , Rfl:     estrogens, conjugated (Premarin) vaginal cream, Insert 0.5 g into the vagina 2 (two) times a week, Disp: 42.5 g, Rfl: 1    HYDROcodone-acetaminophen (NORCO) 5-325 mg per tablet, Take 1 tablet by mouth every 6 (six) hours as needed for pain for up to 12 doses Max Daily Amount: 4 tablets (Patient not taking: Reported on 2/10/2024), Disp: 12 tablet, Rfl: 0    Current Allergies     Allergies as of 02/10/2024 - Reviewed 02/10/2024   Allergen Reaction Noted    Imitrex [sumatriptan] Anaphylaxis 10/06/2020    Penicillins Anaphylaxis 10/16/2018    Codeine GI Intolerance 10/16/2018    Doxycycline GI Intolerance 10/16/2018    Zithromax [azithromycin] GI Intolerance 10/16/2018    Amitriptyline Palpitations 03/31/2023            The following portions of the patient's history were reviewed and updated as appropriate: allergies, current medications, past family history, past medical history, past social history, past  surgical history and problem list.     Past Medical History:   Diagnosis Date    ADHD     Anxiety     Asthma     cold weather, exercise induced    GERD (gastroesophageal reflux disease)     Irritable bowel syndrome     Migraines        Past Surgical History:   Procedure Laterality Date    CHOLECYSTECTOMY LAPAROSCOPIC N/A 2023    Procedure: CHOLECYSTECTOMY LAPAROSCOPIC;  Surgeon: Pascale Young MD;  Location: [facility];  Service: General    CHROMOSOME ANALYSIS, PRODUCTS OF CONCEPTION (HISTORICAL)      COLONOSCOPY      DILATION AND CURETTAGE OF UTERUS      d&E,     HYSTERECTOMY N/A     HI COLONOSCOPY FLX DX W/COLLJ SPEC WHEN PFRMD N/A 10/31/2018    Procedure: EGD AND COLONOSCOPY;  Surgeon: Stone Redman MD;  Location: [facility];  Service: Gastroenterology    HI DILATION & CURETTAGE DX&/THER NONOBSTETRIC N/A 2022    Procedure: D&C Suction;  Surgeon: Stephanie Fabian MD;  [facility]: [facility];  Service: Gynecology    HI VAG HYST 250 GM/< W/RMVL TUBE&/OVARY Bilateral 2022    Procedure: TOTAL VAGINAL HYSTERECTOMY BILATERAL SALPINGECTOMY RIGHT OOPHORECTOMY;  Surgeon: Mila Pinzon MD;  Location:  MAIN OR;  Service: Gynecology    SOFT TISSUE MASS EXCISION      TONSILECTOMY, ADENOIDECTOMY, BILATERAL MYRINGOTOMY AND TUBES      WISDOM TOOTH EXTRACTION         Family History   Problem Relation Age of Onset    Diabetes Mother     Hypertension Father     Heart disease Father     Stroke Father          Medications have been verified.        Objective   /70   Pulse 105   Temp (!) 97.4 °F (36.3 °C) (Tympanic)   Resp 18   LMP 2022 (Exact Date)   SpO2 98%   Patient's last menstrual period was 2022 (exact date).       Physical Exam     Physical Exam  Constitutional:       General: She is not in acute distress.     Appearance: She is well-developed.   HENT:      Head: Normocephalic and atraumatic.      Right Ear: External ear normal.      Left Ear: External ear normal.    Cardiovascular:      Rate and Rhythm: Normal rate.   Pulmonary:      Effort: Pulmonary effort is normal.   Genitourinary:     Comments: Politely defers exam  Neurological:      General: No focal deficit present.      Mental Status: She is alert and oriented to person, place, and time.   Psychiatric:         Speech: Speech normal.         Behavior: Behavior normal.

## 2024-05-07 ENCOUNTER — OFFICE VISIT (OUTPATIENT)
Dept: URGENT CARE | Facility: CLINIC | Age: 29
End: 2024-05-07
Payer: COMMERCIAL

## 2024-05-07 VITALS
RESPIRATION RATE: 18 BRPM | SYSTOLIC BLOOD PRESSURE: 104 MMHG | TEMPERATURE: 98 F | HEART RATE: 77 BPM | OXYGEN SATURATION: 98 % | DIASTOLIC BLOOD PRESSURE: 78 MMHG

## 2024-05-07 DIAGNOSIS — H66.003 ACUTE SUPPURATIVE OTITIS MEDIA OF BOTH EARS WITHOUT SPONTANEOUS RUPTURE OF TYMPANIC MEMBRANES, RECURRENCE NOT SPECIFIED: Primary | ICD-10-CM

## 2024-05-07 PROCEDURE — 99214 OFFICE O/P EST MOD 30 MIN: CPT | Performed by: PHYSICIAN ASSISTANT

## 2024-05-07 RX ORDER — CEPHALEXIN 500 MG/1
500 CAPSULE ORAL EVERY 8 HOURS SCHEDULED
Qty: 21 CAPSULE | Refills: 0 | Status: SHIPPED | OUTPATIENT
Start: 2024-05-07 | End: 2024-05-14

## 2024-05-07 NOTE — PROGRESS NOTES
[facility]        NAME: Erick Wong is a 28 y.o. female  : 1995    MRN: [id number]  DATE: May 7, 2024  TIME: 8:44 AM    Assessment and Plan   Acute suppurative otitis media of both ears without spontaneous rupture of tympanic membranes, recurrence not specified [H66.003]  1. Acute suppurative otitis media of both ears without spontaneous rupture of tympanic membranes, recurrence not specified  cephalexin (KEFLEX) 500 mg capsule        Recommended treatment with cephalexin for ear infection. Patient states she has had it before with no reaction. Recommended tylenol to use as needed for fevers. Patient to continue with as needed conservative symptomatic treatment. Patient can return to the clinic or go to the Emergency Department with any worsening.      Patient Instructions     Patient Instructions   Ear Infection   WHAT YOU NEED TO KNOW:   An ear infection is also called otitis media. Blocked or swollen eustachian tubes can cause an infection. Eustachian tubes connect the middle ear to the back of the nose and throat. They drain fluid from the middle ear. You may have a buildup of fluid in your ear. Germs build up in the fluid and infection develops.       DISCHARGE INSTRUCTIONS:   Return to the emergency department if:   You have clear fluid coming from your ear.    You have a stiff neck, headache, and a fever.    Call your doctor if:   You see blood or pus draining from your ear.    Your ear pain gets worse or does not go away, even after treatment.    The outside of your ear is red or swollen.    You are vomiting or have diarrhea.    You have questions or concerns about your condition or care.    Medicines:  You may  need any of the following:  Acetaminophen  decreases pain and fever. It is available without a doctor's order. Ask how much to take and how often to take it. Follow directions. Read the labels of all other medicines you are using to see if they also contain acetaminophen,  or ask your doctor or pharmacist. Acetaminophen can cause liver damage if not taken correctly.    NSAIDs , such as ibuprofen, help decrease swelling, pain, and fever. This medicine is available with or without a doctor's order. NSAIDs can cause stomach bleeding or kidney problems in certain people. If you take blood thinner medicine, always ask your healthcare provider if NSAIDs are safe for you. Always read the medicine label and follow directions.    Ear drops  may contain medicine to decrease pain and inflammation.    Antibiotics  help treat a bacterial infection.    Take your medicine as directed.  Contact your healthcare provider if you think your medicine is not helping or if you have side effects. Tell your provider if you are allergic to any medicine. Keep a list of the medicines, vitamins, and herbs you take. Include the amounts, and when and why you take them. Bring the list or the pill bottles to follow-up visits. Carry your medicine list with you in case of an emergency.    Self-care:   Apply heat  on your ear for 15 to 20 minutes, 3 to 4 times a day or as directed. You can apply heat with an electric heating pad, hot water bottle, or warm compress. Always put a cloth between your skin and the heat pack to prevent burns. Heat helps decrease pain.    Apply ice  on your ear for 15 to 20 minutes, 3 to 4 times a day for 2 days or as directed. Use an ice pack, or put crushed ice in a plastic bag. Cover it with a towel before you apply it to your ear. Ice decreases swelling and pain.    Prevent an ear infection:   Wash your hands often  to help prevent the spread of germs. Ask everyone in your house to wash their hands with soap and water. Ask them to wash after they use the bathroom or change a diaper. Remind them to wash before they prepare or eat food.         Stay away from people who are ill.  Some germs spread easily and quickly through contact.    Follow up with your doctor as directed:  Write down your  questions so you remember to ask them during your visits.  © Copyright [employer] 2023 Information is for End User's use only and may not be sold, redistributed or otherwise used for commercial purposes.  The above information is an  only. It is not intended as medical advice for individual conditions or treatments. Talk to your doctor, nurse or pharmacist before following any medical regimen to see if it is safe and effective for you.  Follow up with PCP in 3-5 days.  Proceed to  ER if symptoms worsen.    If tests are performed, our office will contact you with results only if changes need to made to the care plan discussed with you at the visit. You can review your full results on [facility] Mychart.     Follow up with PCP in 3-5 days.  Proceed to  ER if symptoms worsen.    If tests are performed, our office will contact you with results only if changes need to made to the care plan discussed with you at the visit. You can [facility].    Chief Complaint     Chief Complaint   Patient presents with    Earache     Pt c/o left ear pain that has lots of pressure and feels like liquid moving for the past 3 days         History of Present Illness       Earache   There is pain in both ears. This is a new problem. The current episode started in the past 7 days. The problem occurs constantly. The problem has been unchanged. There has been no fever. The pain is mild. Associated symptoms include coughing and rhinorrhea. Pertinent negatives include no abdominal pain, ear discharge, headaches, hearing loss, neck pain, rash, sore throat or vomiting. She has tried acetaminophen for the symptoms. The treatment provided mild relief.       Review of Systems   Review of Systems   Constitutional:  Negative for activity change, appetite change, chills, diaphoresis, fatigue, fever and unexpected weight change.   HENT:  Positive for congestion, ear pain and rhinorrhea. Negative for ear  discharge, hearing loss, postnasal drip, sinus pressure, sneezing and sore throat.    Respiratory:  Positive for cough.    Gastrointestinal:  Negative for abdominal pain and vomiting.   Musculoskeletal:  Negative for neck pain.   Skin:  Negative for rash.   Neurological:  Negative for headaches.         Current Medications       Current Outpatient Medications:     acetaminophen (TYLENOL) 650 mg CR tablet, Take 1 tablet (650 mg total) by mouth every 8 (eight) hours as needed for mild pain, Disp: 30 tablet, Rfl: 0    albuterol (Proventil HFA) 90 mcg/act inhaler, Inhale 2 puffs every 6 (six) hours as needed for wheezing or shortness of breath, Disp: 8 g, Rfl: 1    cephalexin (KEFLEX) 500 mg capsule, Take 1 capsule (500 mg total) by mouth every 8 (eight) hours for 7 days, Disp: 21 capsule, Rfl: 0    dicyclomine (BENTYL) 20 mg tablet, Take 1 tablet (20 mg total) by mouth every 6 (six) hours as needed (abdominal pain), Disp: 30 tablet, Rfl: 3    ibuprofen (MOTRIN) 600 mg tablet, Take 1 tablet (600 mg total) by mouth every 6 (six) hours as needed for mild pain, Disp: 30 tablet, Rfl: 0    melatonin 1 mg, Take 1 mg by mouth daily at bedtime, Disp: , Rfl:     pantoprazole (PROTONIX) 40 mg tablet, Take 1 tablet (40 mg total) by mouth daily, Disp: 30 tablet, Rfl: 3    Symbicort 160-4.5 MCG/ACT inhaler, , Disp: , Rfl:     topiramate (TOPAMAX) 50 MG tablet, Take 50 mg by mouth 2 (two) times a day, Disp: , Rfl:     estrogens, conjugated (Premarin) vaginal cream, Insert 0.5 g into the vagina 2 (two) times a week, Disp: 42.5 g, Rfl: 1    HYDROcodone-acetaminophen (NORCO) 5-325 mg per tablet, Take 1 tablet by mouth every 6 (six) hours as needed for pain for up to 12 doses Max Daily Amount: 4 tablets (Patient not taking: Reported on 2/10/2024), Disp: 12 tablet, Rfl: 0    Current Allergies     Allergies as of 05/07/2024 - Reviewed 05/07/2024   Allergen Reaction Noted    Imitrex [sumatriptan] Anaphylaxis 10/06/2020    Penicillins  Anaphylaxis 10/16/2018    Codeine GI Intolerance 10/16/2018    Doxycycline GI Intolerance 10/16/2018    Zithromax [azithromycin] GI Intolerance 10/16/2018    Amitriptyline Palpitations 2023            The following portions of the patient's history were reviewed and updated as appropriate: allergies, current medications, past family history, past medical history, past social history, past surgical history and problem list.     Past Medical History:   Diagnosis Date    ADHD     Anxiety     Asthma     cold weather, exercise induced    GERD (gastroesophageal reflux disease)     Irritable bowel syndrome     Migraines        Past Surgical History:   Procedure Laterality Date    CHOLECYSTECTOMY LAPAROSCOPIC N/A 2023    Procedure: CHOLECYSTECTOMY LAPAROSCOPIC;  Surgeon: Pascale Young MD;  Location: [facility];  Service: General    CHROMOSOME ANALYSIS, PRODUCTS OF CONCEPTION (HISTORICAL)      COLONOSCOPY      DILATION AND CURETTAGE OF UTERUS      d&E,     HYSTERECTOMY N/A     TN COLONOSCOPY FLX DX W/COLLJ SPEC WHEN PFRMD N/A 10/31/2018    Procedure: EGD AND COLONOSCOPY;  Surgeon: Stone Redman MD;  Location: [facility];  Service: Gastroenterology    TN DILATION & CURETTAGE DX&/THER NONOBSTETRIC N/A 2022    Procedure: D&C Suction;  Surgeon: Stephanie Fabian MD;  Location: [facility];  Service: Gynecology    TN VAG HYST 250 GM/< W/RMVL TUBE&/OVARY Bilateral 2022    Procedure: TOTAL VAGINAL HYSTERECTOMY BILATERAL SALPINGECTOMY RIGHT OOPHORECTOMY;  Surgeon: Mila Pinzon MD;  Location:  MAIN OR;  Service: Gynecology    SOFT TISSUE MASS EXCISION      TONSILECTOMY, ADENOIDECTOMY, BILATERAL MYRINGOTOMY AND TUBES      WISDOM TOOTH EXTRACTION         Family History   Problem Relation Age of Onset    Diabetes Mother     Hypertension Father     Heart disease Father     Stroke Father          Medications have been verified.        Objective   /78   Pulse 77   Temp 98 °F (36.7 °C)  (Tympanic)   Resp 18   LMP 08/01/2022 (Exact Date)   SpO2 98%        Physical Exam     Physical Exam  Constitutional:       Appearance: Normal appearance.   HENT:      Right Ear: Ear canal and external ear normal. A middle ear effusion is present. Tympanic membrane is erythematous and bulging.      Left Ear: Ear canal and external ear normal. A middle ear effusion is present. Tympanic membrane is bulging. Tympanic membrane is not erythematous.      Nose: Nose normal.      Mouth/Throat:      Mouth: Mucous membranes are moist.   Eyes:      Pupils: Pupils are equal, round, and reactive to light.   Cardiovascular:      Rate and Rhythm: Normal rate and regular rhythm.   Pulmonary:      Effort: Pulmonary effort is normal.      Breath sounds: Normal breath sounds.   Neurological:      Mental Status: She is alert.   Psychiatric:         Mood and Affect: Mood normal.         Behavior: Behavior normal.

## 2024-05-07 NOTE — PATIENT INSTRUCTIONS
Ear Infection   WHAT YOU NEED TO KNOW:   An ear infection is also called otitis media. Blocked or swollen eustachian tubes can cause an infection. Eustachian tubes connect the middle ear to the back of the nose and throat. They drain fluid from the middle ear. You may have a buildup of fluid in your ear. Germs build up in the fluid and infection develops.       DISCHARGE INSTRUCTIONS:   Return to the emergency department if:   You have clear fluid coming from your ear.    You have a stiff neck, headache, and a fever.    Call your doctor if:   You see blood or pus draining from your ear.    Your ear pain gets worse or does not go away, even after treatment.    The outside of your ear is red or swollen.    You are vomiting or have diarrhea.    You have questions or concerns about your condition or care.    Medicines:  You may  need any of the following:  Acetaminophen  decreases pain and fever. It is available without a doctor's order. Ask how much to take and how often to take it. Follow directions. Read the labels of all other medicines you are using to see if they also contain acetaminophen, or ask your doctor or pharmacist. Acetaminophen can cause liver damage if not taken correctly.    NSAIDs , such as ibuprofen, help decrease swelling, pain, and fever. This medicine is available with or without a doctor's order. NSAIDs can cause stomach bleeding or kidney problems in certain people. If you take blood thinner medicine, always ask your healthcare provider if NSAIDs are safe for you. Always read the medicine label and follow directions.    Ear drops  may contain medicine to decrease pain and inflammation.    Antibiotics  help treat a bacterial infection.    Take your medicine as directed.  Contact your healthcare provider if you think your medicine is not helping or if you have side effects. Tell your provider if you are allergic to any medicine. Keep a list of the medicines, vitamins, and herbs you take. Include  the amounts, and when and why you take them. Bring the list or the pill bottles to follow-up visits. Carry your medicine list with you in case of an emergency.    Self-care:   Apply heat  on your ear for 15 to 20 minutes, 3 to 4 times a day or as directed. You can apply heat with an electric heating pad, hot water bottle, or warm compress. Always put a cloth between your skin and the heat pack to prevent burns. Heat helps decrease pain.    Apply ice  on your ear for 15 to 20 minutes, 3 to 4 times a day for 2 days or as directed. Use an ice pack, or put crushed ice in a plastic bag. Cover it with a towel before you apply it to your ear. Ice decreases swelling and pain.    Prevent an ear infection:   Wash your hands often  to help prevent the spread of germs. Ask everyone in your house to wash their hands with soap and water. Ask them to wash after they use the bathroom or change a diaper. Remind them to wash before they prepare or eat food.         Stay away from people who are ill.  Some germs spread easily and quickly through contact.    Follow up with your doctor as directed:  Write down your questions so you remember to ask them during your visits.  © Copyright [employer] 2023 Information is for End User's use only and may not be sold, redistributed or otherwise used for commercial purposes.  The above information is an  only. It is not intended as medical advice for individual conditions or treatments. Talk to your doctor, nurse or pharmacist before following any medical regimen to see if it is safe and effective for you.  Follow up with PCP in 3-5 days.  Proceed to  ER if symptoms worsen.    If tests are performed, our office will contact you with results only if changes need to made to the care plan discussed with you at the visit. You can review your full results on [facility] Mychart.

## 2024-06-08 ENCOUNTER — PATIENT MESSAGE (OUTPATIENT)
Dept: OBGYN CLINIC | Facility: CLINIC | Age: 29
End: 2024-06-08

## 2024-06-08 DIAGNOSIS — N95.1 HOT FLUSHES, PERIMENOPAUSAL: Primary | ICD-10-CM

## 2024-08-01 ENCOUNTER — APPOINTMENT (EMERGENCY)
Dept: CT IMAGING | Facility: HOSPITAL | Age: 29
End: 2024-08-01
Payer: COMMERCIAL

## 2024-08-01 ENCOUNTER — HOSPITAL ENCOUNTER (EMERGENCY)
Facility: HOSPITAL | Age: 29
Discharge: HOME | End: 2024-08-01
Attending: EMERGENCY MEDICINE
Payer: COMMERCIAL

## 2024-08-01 VITALS
DIASTOLIC BLOOD PRESSURE: 60 MMHG | HEART RATE: 71 BPM | TEMPERATURE: 97.8 F | OXYGEN SATURATION: 97 % | SYSTOLIC BLOOD PRESSURE: 99 MMHG | RESPIRATION RATE: 20 BRPM

## 2024-08-01 DIAGNOSIS — R10.84 GENERALIZED ABDOMINAL PAIN: Primary | ICD-10-CM

## 2024-08-01 DIAGNOSIS — R11.0 NAUSEA: ICD-10-CM

## 2024-08-01 LAB
ALBUMIN SERPL BCG-MCNC: 4.3 G/DL (ref 3.5–5)
ALP SERPL-CCNC: 52 U/L (ref 34–104)
ALT SERPL W P-5'-P-CCNC: 7 U/L (ref 7–52)
ANION GAP SERPL CALCULATED.3IONS-SCNC: 7 MMOL/L (ref 4–13)
AST SERPL W P-5'-P-CCNC: 11 U/L (ref 13–39)
ATRIAL RATE: 72 BPM
BASOPHILS # BLD AUTO: 0.05 THOUSANDS/ÂΜL (ref 0–0.1)
BASOPHILS NFR BLD AUTO: 1 % (ref 0–1)
BILIRUB SERPL-MCNC: 0.25 MG/DL (ref 0.2–1)
BILIRUB UR QL STRIP: NEGATIVE
BUN SERPL-MCNC: 14 MG/DL (ref 5–25)
CALCIUM SERPL-MCNC: 8.6 MG/DL (ref 8.4–10.2)
CHLORIDE SERPL-SCNC: 107 MMOL/L (ref 96–108)
CLARITY UR: NORMAL
CO2 SERPL-SCNC: 25 MMOL/L (ref 21–32)
COLOR UR: NORMAL
CREAT SERPL-MCNC: 0.58 MG/DL (ref 0.6–1.3)
EOSINOPHIL # BLD AUTO: 0.16 THOUSAND/ÂΜL (ref 0–0.61)
EOSINOPHIL NFR BLD AUTO: 2 % (ref 0–6)
ERYTHROCYTE [DISTWIDTH] IN BLOOD BY AUTOMATED COUNT: 11.6 % (ref 11.6–15.1)
EXT PREGNANCY TEST URINE: NEGATIVE
EXT. CONTROL: NORMAL
GFR SERPL CREATININE-BSD FRML MDRD: 124 ML/MIN/1.73SQ M
GLUCOSE SERPL-MCNC: 81 MG/DL (ref 65–140)
GLUCOSE UR STRIP-MCNC: NEGATIVE MG/DL
HCT VFR BLD AUTO: 40 % (ref 34.8–46.1)
HGB BLD-MCNC: 13.4 G/DL (ref 11.5–15.4)
HGB UR QL STRIP.AUTO: NEGATIVE
IMM GRANULOCYTES # BLD AUTO: 0.03 THOUSAND/UL (ref 0–0.2)
IMM GRANULOCYTES NFR BLD AUTO: 0 % (ref 0–2)
KETONES UR STRIP-MCNC: NEGATIVE MG/DL
LEUKOCYTE ESTERASE UR QL STRIP: NEGATIVE
LIPASE SERPL-CCNC: 25 U/L (ref 11–82)
LYMPHOCYTES # BLD AUTO: 2.14 THOUSANDS/ÂΜL (ref 0.6–4.47)
LYMPHOCYTES NFR BLD AUTO: 20 % (ref 14–44)
MCH RBC QN AUTO: 31.2 PG (ref 26.8–34.3)
MCHC RBC AUTO-ENTMCNC: 33.5 G/DL (ref 31.4–37.4)
MCV RBC AUTO: 93 FL (ref 82–98)
MONOCYTES # BLD AUTO: 0.61 THOUSAND/ÂΜL (ref 0.17–1.22)
MONOCYTES NFR BLD AUTO: 6 % (ref 4–12)
NEUTROPHILS # BLD AUTO: 7.94 THOUSANDS/ÂΜL (ref 1.85–7.62)
NEUTS SEG NFR BLD AUTO: 71 % (ref 43–75)
NITRITE UR QL STRIP: NEGATIVE
NRBC BLD AUTO-RTO: 0 /100 WBCS
P AXIS: 63 DEGREES
PH UR STRIP.AUTO: 7 [PH]
PLATELET # BLD AUTO: 306 THOUSANDS/UL (ref 149–390)
PMV BLD AUTO: 11 FL (ref 8.9–12.7)
POTASSIUM SERPL-SCNC: 3.8 MMOL/L (ref 3.5–5.3)
PR INTERVAL: 178 MS
PROT SERPL-MCNC: 7.1 G/DL (ref 6.4–8.4)
PROT UR STRIP-MCNC: NEGATIVE MG/DL
QRS AXIS: 96 DEGREES
QRSD INTERVAL: 88 MS
QT INTERVAL: 396 MS
QTC INTERVAL: 433 MS
RBC # BLD AUTO: 4.29 MILLION/UL (ref 3.81–5.12)
SODIUM SERPL-SCNC: 139 MMOL/L (ref 135–147)
SP GR UR STRIP.AUTO: 1.02 (ref 1–1.03)
T WAVE AXIS: 37 DEGREES
UROBILINOGEN UR STRIP-ACNC: <2 MG/DL
VENTRICULAR RATE: 72 BPM
WBC # BLD AUTO: 10.93 THOUSAND/UL (ref 4.31–10.16)

## 2024-08-01 PROCEDURE — 83690 ASSAY OF LIPASE: CPT | Performed by: EMERGENCY MEDICINE

## 2024-08-01 PROCEDURE — 93005 ELECTROCARDIOGRAM TRACING: CPT

## 2024-08-01 PROCEDURE — 74177 CT ABD & PELVIS W/CONTRAST: CPT

## 2024-08-01 PROCEDURE — 99285 EMERGENCY DEPT VISIT HI MDM: CPT | Performed by: EMERGENCY MEDICINE

## 2024-08-01 PROCEDURE — 81003 URINALYSIS AUTO W/O SCOPE: CPT

## 2024-08-01 PROCEDURE — 99284 EMERGENCY DEPT VISIT MOD MDM: CPT

## 2024-08-01 PROCEDURE — 81025 URINE PREGNANCY TEST: CPT | Performed by: EMERGENCY MEDICINE

## 2024-08-01 PROCEDURE — 85025 COMPLETE CBC W/AUTO DIFF WBC: CPT | Performed by: EMERGENCY MEDICINE

## 2024-08-01 PROCEDURE — 93010 ELECTROCARDIOGRAM REPORT: CPT | Performed by: INTERNAL MEDICINE

## 2024-08-01 PROCEDURE — 96375 TX/PRO/DX INJ NEW DRUG ADDON: CPT

## 2024-08-01 PROCEDURE — 96365 THER/PROPH/DIAG IV INF INIT: CPT

## 2024-08-01 PROCEDURE — 36415 COLL VENOUS BLD VENIPUNCTURE: CPT | Performed by: EMERGENCY MEDICINE

## 2024-08-01 PROCEDURE — 96366 THER/PROPH/DIAG IV INF ADDON: CPT

## 2024-08-01 PROCEDURE — 80053 COMPREHEN METABOLIC PANEL: CPT | Performed by: EMERGENCY MEDICINE

## 2024-08-01 RX ORDER — ONDANSETRON 2 MG/ML
4 INJECTION INTRAMUSCULAR; INTRAVENOUS ONCE
Status: COMPLETED | OUTPATIENT
Start: 2024-08-01 | End: 2024-08-01

## 2024-08-01 RX ORDER — KETOROLAC TROMETHAMINE 30 MG/ML
15 INJECTION, SOLUTION INTRAMUSCULAR; INTRAVENOUS ONCE
Status: COMPLETED | OUTPATIENT
Start: 2024-08-01 | End: 2024-08-01

## 2024-08-01 RX ORDER — ONDANSETRON 4 MG/1
4 TABLET, ORALLY DISINTEGRATING ORAL EVERY 6 HOURS PRN
Qty: 15 TABLET | Refills: 0 | Status: SHIPPED | OUTPATIENT
Start: 2024-08-01

## 2024-08-01 RX ORDER — ACETAMINOPHEN 10 MG/ML
1000 INJECTION, SOLUTION INTRAVENOUS ONCE
Status: COMPLETED | OUTPATIENT
Start: 2024-08-01 | End: 2024-08-01

## 2024-08-01 RX ADMIN — ONDANSETRON 4 MG: 2 INJECTION INTRAMUSCULAR; INTRAVENOUS at 19:24

## 2024-08-01 RX ADMIN — KETOROLAC TROMETHAMINE 15 MG: 30 INJECTION, SOLUTION INTRAMUSCULAR at 19:41

## 2024-08-01 RX ADMIN — IOHEXOL 100 ML: 350 INJECTION, SOLUTION INTRAVENOUS at 20:10

## 2024-08-01 RX ADMIN — ACETAMINOPHEN 1000 MG: 10 INJECTION INTRAVENOUS at 19:24

## 2024-08-01 NOTE — ED PROVIDER NOTES
History  Chief Complaint   Patient presents with    Abdominal Pain     Bilateral upper quadrant pain that is now radiating down to RLQ and up to Right flank starting around 1600 today while at work. Hx of hysterectomy, and cholecystectomy. Reports nausea but no vomiting, last BM yesterday.      28-year-old female presents for evaluation with epigastric and right-sided abdominal pain which began earlier this evening.  Patient reports having her gallbladder removed approximately 1 year ago, states that the pain she is currently experiencing feels similar to what she experienced prior to having her gallbladder removed.  She states that the pain began in the epigastric region, and then radiated to the right side, and into her back.  She endorses associated nausea, but denies any episodes of vomiting.  She denies any associated chest pain or shortness of breath.  Denies any recent fevers or chills.        Prior to Admission Medications   Prescriptions Last Dose Informant Patient Reported? Taking?   HYDROcodone-acetaminophen (NORCO) 5-325 mg per tablet   No No   Sig: Take 1 tablet by mouth every 6 (six) hours as needed for pain for up to 12 doses Max Daily Amount: 4 tablets   Patient not taking: Reported on 2/10/2024   Symbicort 160-4.5 MCG/ACT inhaler   Yes No   acetaminophen (TYLENOL) 650 mg CR tablet  Self No No   Sig: Take 1 tablet (650 mg total) by mouth every 8 (eight) hours as needed for mild pain   albuterol (Proventil HFA) 90 mcg/act inhaler  Self No No   Sig: Inhale 2 puffs every 6 (six) hours as needed for wheezing or shortness of breath   dicyclomine (BENTYL) 20 mg tablet  Self No No   Sig: Take 1 tablet (20 mg total) by mouth every 6 (six) hours as needed (abdominal pain)   estrogens, conjugated (Premarin) vaginal cream   No No   Sig: Insert 0.5 g into the vagina 2 (two) times a week   ibuprofen (MOTRIN) 600 mg tablet  Self No No   Sig: Take 1 tablet (600 mg total) by mouth every 6 (six) hours as needed for  mild pain   melatonin 1 mg  Self Yes No   Sig: Take 1 mg by mouth daily at bedtime   pantoprazole (PROTONIX) 40 mg tablet  Self No No   Sig: Take 1 tablet (40 mg total) by mouth daily   topiramate (TOPAMAX) 50 MG tablet   Yes No   Sig: Take 50 mg by mouth 2 (two) times a day      Facility-Administered Medications: None       Past Medical History:   Diagnosis Date    ADHD     Anxiety     Asthma     cold weather, exercise induced    GERD (gastroesophageal reflux disease)     Irritable bowel syndrome     Migraines        Past Surgical History:   Procedure Laterality Date    CHOLECYSTECTOMY LAPAROSCOPIC N/A 2023    Procedure: CHOLECYSTECTOMY LAPAROSCOPIC;  Surgeon: Pascale Young MD;  Location: [facility];  Service: General    CHROMOSOME ANALYSIS, PRODUCTS OF CONCEPTION (HISTORICAL)      COLONOSCOPY      DILATION AND CURETTAGE OF UTERUS      d&E,     HYSTERECTOMY N/A     TX COLONOSCOPY FLX DX W/COLLJ SPEC WHEN PFRMD N/A 10/31/2018    Procedure: EGD AND COLONOSCOPY;  Surgeon: Stone Redman MD;  Location: [facility];  Service: Gastroenterology    TX DILATION & CURETTAGE DX&/THER NONOBSTETRIC N/A 2022    Procedure: D&C Suction;  Surgeon: Stephanie Fabian MD;  Location: [facility];  Service: Gynecology    TX VAG HYST 250 GM/< W/RMVL TUBE&/OVARY Bilateral 2022    Procedure: TOTAL VAGINAL HYSTERECTOMY BILATERAL SALPINGECTOMY RIGHT OOPHORECTOMY;  Surgeon: Mila Pinzon MD;  Location:  MAIN OR;  Service: Gynecology    SOFT TISSUE MASS EXCISION      TONSILECTOMY, ADENOIDECTOMY, BILATERAL MYRINGOTOMY AND TUBES      WISDOM TOOTH EXTRACTION         Family History   Problem Relation Age of Onset    Diabetes Mother     Hypertension Father     Heart disease Father     Stroke Father      I have reviewed and agree with the history as documented.    E-Cigarette/Vaping    E-Cigarette Use Never User      E-Cigarette/Vaping Substances    Nicotine No     THC No     CBD No     Flavoring No     Other  No     Unknown No      Social History     Tobacco Use    Smoking status: Every Day     Current packs/day: 0.25     Types: Cigarettes    Smokeless tobacco: Never   Vaping Use    Vaping status: Never Used   Substance Use Topics    Alcohol use: No     Comment: rarely    Drug use: No       Review of Systems   Constitutional:  Negative for fever.   Respiratory:  Negative for shortness of breath.    Cardiovascular:  Negative for chest pain.   Gastrointestinal:  Positive for abdominal pain and nausea. Negative for vomiting.   Musculoskeletal:  Positive for back pain.   All other systems reviewed and are negative.      Physical Exam  Physical Exam  Vitals and nursing note reviewed.   Constitutional:       General: She is awake. She is not in acute distress.     Appearance: She is not toxic-appearing.   HENT:      Head: Normocephalic and atraumatic.   Eyes:      General: Vision grossly intact. Gaze aligned appropriately.   Cardiovascular:      Rate and Rhythm: Normal rate and regular rhythm.      Heart sounds: Normal heart sounds.   Pulmonary:      Effort: Pulmonary effort is normal. No respiratory distress.      Breath sounds: Normal breath sounds.   Abdominal:      General: There is no distension.      Palpations: Abdomen is soft.      Tenderness: There is abdominal tenderness in the right upper quadrant and right lower quadrant.   Musculoskeletal:      Cervical back: Full passive range of motion without pain and neck supple.   Skin:     General: Skin is warm and dry.   Neurological:      General: No focal deficit present.      Mental Status: She is alert and oriented to person, place, and time.         Vital Signs  ED Triage Vitals [08/01/24 1830]   Temperature Pulse Respirations Blood Pressure SpO2   97.8 °F (36.6 °C) 78 19 121/73 97 %      Temp Source Heart Rate Source Patient Position - Orthostatic VS BP Location FiO2 (%)   Oral Monitor Lying Left arm --      Pain Score       --           Vitals:    08/01/24 1930  08/01/24 2000 08/01/24 2015 08/01/24 2045   BP: 98/66 94/54 99/60    Pulse: 69 60 73 71   Patient Position - Orthostatic VS:             Visual Acuity      ED Medications  Medications   ondansetron (ZOFRAN) injection 4 mg (4 mg Intravenous Given 8/1/24 1924)   acetaminophen (Ofirmev) injection 1,000 mg (0 mg Intravenous Stopped 8/1/24 2058)   ketorolac (TORADOL) injection 15 mg (15 mg Intravenous Given 8/1/24 1941)   iohexol (OMNIPAQUE) 350 MG/ML injection (MULTI-DOSE) 100 mL (100 mL Intravenous Given 8/1/24 2010)       Diagnostic Studies  Results Reviewed       Procedure Component Value Units Date/Time    Comprehensive metabolic panel [[id number]]  (Abnormal) Collected: 08/01/24 1924    Lab Status: Final result Specimen: Blood from Arm, Left Updated: 08/01/24 1957     Sodium 139 mmol/L      Potassium 3.8 mmol/L      Chloride 107 mmol/L      CO2 25 mmol/L      ANION GAP 7 mmol/L      BUN 14 mg/dL      Creatinine 0.58 mg/dL      Glucose 81 mg/dL      Calcium 8.6 mg/dL      AST 11 U/L      ALT 7 U/L      Alkaline Phosphatase 52 U/L      Total Protein 7.1 g/dL      Albumin 4.3 g/dL      Total Bilirubin 0.25 mg/dL      eGFR 124 ml/min/1.73sq m     Narrative:      National Kid[employer] guidelines for Chronic Kidney Disease (CKD):     Stage 1 with normal or high GFR (GFR > 90 mL/min/1.73 square meters)    Stage 2 Mild CKD (GFR = 60-89 mL/min/1.73 square meters)    Stage 3A Moderate CKD (GFR = 45-59 mL/min/1.73 square meters)    Stage 3B Moderate CKD (GFR = 30-44 mL/min/1.73 square meters)    Stage 4 Severe CKD (GFR = 15-29 mL/min/1.73 square meters)    Stage 5 End Stage CKD (GFR <15 mL/min/1.73 square meters)  Note: GFR calculation is accurate only with a steady state creatinine    Lipase [[id number]]  (Normal) Collected: 08/01/24 1924    Lab Status: Final result Specimen: Blood from Arm, Left Updated: 08/01/24 1957     Lipase 25 u/L     UA (URINE) with reflex to Scope [[id number]] Collected: 08/01/24 192     Lab Status: Final result Specimen: Urine, Clean Catch Updated: 08/01/24 1938     Color, UA Light Yellow     Clarity, UA Turbid     Specific Gravity, UA 1.017     pH, UA 7.0     Leukocytes, UA Negative     Nitrite, UA Negative     Protein, UA Negative mg/dl      Glucose, UA Negative mg/dl      Ketones, UA Negative mg/dl      Urobilinogen, UA <2.0 mg/dl      Bilirubin, UA Negative     Occult Blood, UA Negative    CBC and differential [[id number]]  (Abnormal) Collected: 08/01/24 1924    Lab Status: Final result Specimen: Blood from Arm, Left Updated: 08/01/24 1937     WBC 10.93 Thousand/uL      RBC 4.29 Million/uL      Hemoglobin 13.4 g/dL      Hematocrit 40.0 %      MCV 93 fL      MCH 31.2 pg      MCHC 33.5 g/dL      RDW 11.6 %      MPV 11.0 fL      Platelets 306 Thousands/uL      nRBC 0 /100 WBCs      Segmented % 71 %      Immature Grans % 0 %      Lymphocytes % 20 %      Monocytes % 6 %      Eosinophils Relative 2 %      Basophils Relative 1 %      Absolute Neutrophils 7.94 Thousands/µL      Absolute Immature Grans 0.03 Thousand/uL      Absolute Lymphocytes 2.14 Thousands/µL      Absolute Monocytes 0.61 Thousand/µL      Eosinophils Absolute 0.16 Thousand/µL      Basophils Absolute 0.05 Thousands/µL     POCT pregnancy, urine [[id number]]  (Normal) Resulted: 08/01/24 1931    Lab Status: Final result Updated: 08/01/24 1931     EXT Preg Test, Ur Negative     Control Valid                   CT abdomen pelvis with contrast   Final Result by Arjun Dugan MD (08/01 2035)      No acute findings in the abdomen or pelvis.         Workstation performed: [id number]                    Procedures  Procedures         ED Course  ED Course as of 08/01/24 2346   Thu Aug 01, 2024   1906 Procedure Note: EKG  Date/Time: 08/01/24 7:06 PM   Interpreted by: Edel Maxwell D.O.  Indications / Diagnosis: abdominal pain, nausea  ECG reviewed by me, the ED Provider: yes   The EKG demonstrates:  Rhythm: normal sinus with sinus  arrhythmia  Intervals: normal intervals  Axis: right axis  QRS/Blocks: normal QRS  ST Changes: No acute ST Changes, no STD/CECILIO.   1935 PREGNANCY TEST URINE: Negative                                               Medical Decision Making  28-year-old female presents for evaluation with epigastric and right-sided abdominal pain with associated nausea which began earlier this evening.  On exam, patient with normal vitals, no acute distress.  Patient noted to have tenderness over the right side of the abdomen without guarding or rebound tenderness.  Exam otherwise unremarkable.  Differential diagnosis includes, but is not limited to, retained gallstone, choledocholithiasis, pancreatitis, kidney stone, urinary tract infection/pyelonephritis, appendicitis, gastritis, or other acute intra-abdominal pathology.     CBC, CMP, and lipase unremarkable.  Urinalysis negative for signs of infection.  CT scan of the abdomen pelvis negative for acute intra-abdominal pathology.  On reevaluation after Tylenol, Toradol, and Zofran administration, patient reports improvement in symptoms.  At this time, unsure what was causing the patient's pain, but there are no findings to suggest any emergent/life-threatening pathology at this time.  Patient discharged home with prescription for Zofran to use as needed.  She was given symptomatic care instructions and strict ED return precautions.    Amount and/or Complexity of Data Reviewed  Labs: ordered. Decision-making details documented in ED Course.  Radiology: ordered.    Risk  Prescription drug management.                 Disposition  Final diagnoses:   Generalized abdominal pain   Nausea     Time reflects when diagnosis was documented in both MDM as applicable and the Disposition within this note       Time User Action Codes Description Comment    8/1/2024  8:47 PM Edel Maxwell [R10.84] Generalized abdominal pain     8/1/2024  8:47 PM Edel Maxwell [R11.0] Nausea           ED Disposition        ED Disposition   Discharge    Condition   Stable    Date/Time   Thu Aug 1, 2024  8:47 PM    Comment   Erick Wong discharge to home/self care.                   Follow-up Information       Follow up With Specialties Details Why Contact Info Additional Information    Smiley Bose MD Family Medicine Schedule an appointment as soon as possible for a visit  As needed [address], [address]  [place] [address]  [phone]       [facility] Emergency Department Emergency Medicine Go to  If symptoms worsen [address] [facility] [address]  [phone] [facility] Emergency Department, [address] [place], [place], [address]            Discharge Medication List as of 8/1/2024  8:48 PM        START taking these medications    Details   ondansetron (ZOFRAN-ODT) 4 mg disintegrating tablet Take 1 tablet (4 mg total) by mouth every 6 (six) hours as needed for nausea or vomiting, Starting u 8/1/2024, Normal           CONTINUE these medications which have NOT CHANGED    Details   acetaminophen (TYLENOL) 650 mg CR tablet Take 1 tablet (650 mg total) by mouth every 8 (eight) hours as needed for mild pain, Starting Wed 11/9/2022, Normal      albuterol (Proventil HFA) 90 mcg/act inhaler Inhale 2 puffs every 6 (six) hours as needed for wheezing or shortness of breath, Starting Thu 12/8/2022, Normal      dicyclomine (BENTYL) 20 mg tablet Take 1 tablet (20 mg total) by mouth every 6 (six) hours as needed (abdominal pain), Starting Fri 7/7/2023, Normal      estrogens, conjugated (Premarin) vaginal cream Insert 0.5 g into the vagina 2 (two) times a week, Starting Thu 8/17/2023, Until Sat 9/16/2023, Normal      HYDROcodone-acetaminophen (NORCO) 5-325 mg per tablet Take 1 tablet by mouth every 6 (six) hours as needed for pain for up to 12 doses Max Daily Amount: 4 tablets, Starting Tue 1/30/2024, Normal      ibuprofen (MOTRIN) 600 mg tablet Take  1 tablet (600 mg total) by mouth every 6 (six) hours as needed for mild pain, Starting Wed 11/9/2022, Normal      melatonin 1 mg Take 1 mg by mouth daily at bedtime, Historical Med      pantoprazole (PROTONIX) 40 mg tablet Take 1 tablet (40 mg total) by mouth daily, Starting Fri 7/7/2023, Normal      Symbicort 160-4.5 MCG/ACT inhaler Starting Wed 8/16/2023, Historical Med      topiramate (TOPAMAX) 50 MG tablet Take 50 mg by mouth 2 (two) times a day, Starting Thu 2/8/2024, Until Fri 2/7/2025, Historical Med             No discharge procedures on file.    PDMP Review       None            ED Provider  Electronically Signed by             Edel Maxwell DO  08/01/24 1589

## 2024-08-01 NOTE — Clinical Note
Erick Wong was seen and treated in our emergency department on 8/1/2024.                Diagnosis:     Erick  .    She may return on this date: 08/04/2024         If you have any questions or concerns, please don't hesitate to call.      Sasha Paulino RN    ______________________________           _______________          _______________  Hospital Representative                              Date                                Time

## 2024-08-02 NOTE — DISCHARGE INSTRUCTIONS
You may take Zofran as needed for nausea and vomiting.  You may take Pepcid twice a day as needed for gastritis symptoms.  Please follow-up with your GI doctor for further evaluation.  Return to the emergency department for any new or worsening symptoms.

## (undated) DEVICE — NEPTUNE E-SEP SMOKE EVACUATION PENCIL, COATED, 70MM BLADE, PUSH BUTTON SWITCH: Brand: NEPTUNE E-SEP

## (undated) DEVICE — [HIGH FLOW INSUFFLATOR,  DO NOT USE IF PACKAGE IS DAMAGED,  KEEP DRY,  KEEP AWAY FROM SUNLIGHT,  PROTECT FROM HEAT AND RADIOACTIVE SOURCES.]: Brand: PNEUMOSURE

## (undated) DEVICE — BETHLEHEM UNIVERSAL MINOR VAG: Brand: CARDINAL HEALTH

## (undated) DEVICE — 3M™ STERI-STRIP™ REINFORCED ADHESIVE SKIN CLOSURES, R1542, 1/4 IN X 1-1/2 IN (6 MM X 38 MM), 6 STRIPS/ENVELOPE: Brand: 3M™ STERI-STRIP™

## (undated) DEVICE — D + E SUCTION CANISTER

## (undated) DEVICE — CHLORHEXIDINE 4PCT 4 OZ

## (undated) DEVICE — PACK PBDS MAJOR GYN VAGINAL SLB

## (undated) DEVICE — SUT VICRYL 0 UR-6 27 IN J603H

## (undated) DEVICE — GLOVE PI ULTRA TOUCH SZ.6.5

## (undated) DEVICE — 3M™ STERI-STRIP™ REINFORCED ADHESIVE SKIN CLOSURES, R1546, 1/4 IN X 4 IN (6 MM X 100 MM), 10 STRIPS/ENVELOPE: Brand: 3M™ STERI-STRIP™

## (undated) DEVICE — SUT VICRYL 0 CT-1 27 IN J260H

## (undated) DEVICE — SCD SEQUENTIAL COMPRESSION COMFORT SLEEVE MEDIUM KNEE LENGTH: Brand: KENDALL SCD

## (undated) DEVICE — GAUZE SPONGES,8 PLY: Brand: CURITY

## (undated) DEVICE — METZENBAUM ADTEC SINGLE USE DISSECTING SCISSORS, SHAFT ONLY, MONOPOLAR, CURVED TO LEFT, WORKING LENGTH: 12 1/4", (310 MM), DIAM. 5 MM, INSULATED, DOUBLE ACTION, STERILE, DISPOSABLE, PACKAGE OF 10 PIECES: Brand: AESCULAP

## (undated) DEVICE — TISSUE RETRIEVAL SYSTEM: Brand: INZII RETRIEVAL SYSTEM

## (undated) DEVICE — CURETTE VACURETTE CRVD 7MM

## (undated) DEVICE — 3000CC GUARDIAN II: Brand: GUARDIAN

## (undated) DEVICE — GLOVE INDICATOR PI UNDERGLOVE SZ 6.5 BLUE

## (undated) DEVICE — PENCIL ELECTROSURG E-Z CLEAN -0035H

## (undated) DEVICE — IRRIG ENDO FLO TUBING

## (undated) DEVICE — PAD GROUNDING ADULT

## (undated) DEVICE — 3M™ TEGADERM™ TRANSPARENT FILM DRESSING FRAME STYLE, 1624W, 2-3/8 IN X 2-3/4 IN (6 CM X 7 CM), 100/CT 4CT/CASE: Brand: 3M™ TEGADERM™

## (undated) DEVICE — ALL PURPOSE SPONGES,NONWOVEN, 4 PLY: Brand: CURITY

## (undated) DEVICE — LIGAMAX 5 MM ENDOSCOPIC MULTIPLE CLIP APPLIER: Brand: LIGAMAX

## (undated) DEVICE — TROCAR: Brand: KII® SLEEVE

## (undated) DEVICE — COLLECTION SET, DISPOSABLE WITH HANDLE AND TAPERED FITTINGS TUBING, 6 FT (183 CM): Brand: GYRUS ACMI

## (undated) DEVICE — GLOVE SRG BIOGEL 7

## (undated) DEVICE — ELECTRODE LAP L WIRE E-Z CLEAN 33CM -0100

## (undated) DEVICE — ALLENTOWN LAP CHOLE APP PACK: Brand: CARDINAL HEALTH

## (undated) DEVICE — D + E TUBING W /FILTER

## (undated) DEVICE — LIGHT HANDLE COVER SLEEVE DISP BLUE STELLAR

## (undated) DEVICE — TROCARS: Brand: KII® BALLOON BLUNT TIP SYSTEM

## (undated) DEVICE — DRAPE EQUIPMENT RF WAND

## (undated) DEVICE — 5 MM CURVED DISSECTORS WITH MONOPOLAR CAUTERY: Brand: ENDOPATH

## (undated) DEVICE — D + E CONNECTION HOSE

## (undated) DEVICE — D + E SAFE TOUCH TISSUE TRAP (CIRCON)

## (undated) DEVICE — 4-PORT MANIFOLD: Brand: NEPTUNE 2

## (undated) DEVICE — CHLORAPREP HI-LITE 26ML ORANGE

## (undated) DEVICE — INTENDED FOR TISSUE SEPARATION, AND OTHER PROCEDURES THAT REQUIRE A SHARP SURGICAL BLADE TO PUNCTURE OR CUT.: Brand: BARD-PARKER SAFETY BLADES SIZE 11, STERILE

## (undated) DEVICE — SUT VICRYL 0 CT-1 CR/8 27 IN JJ41G

## (undated) DEVICE — SUT MONOCRYL 4-0 SH 27 IN Y315H

## (undated) DEVICE — TOWEL SET X-RAY

## (undated) DEVICE — TUBING SMOKE EVAC W/FILTRATION DEVICE PLUMEPORT ACTIV

## (undated) DEVICE — TROCAR: Brand: KII FIOS FIRST ENTRY

## (undated) DEVICE — SYRINGE 10ML LL

## (undated) DEVICE — NEEDLE SPINAL 20G X 3.5 LF